# Patient Record
Sex: FEMALE | Race: BLACK OR AFRICAN AMERICAN | NOT HISPANIC OR LATINO | Employment: UNEMPLOYED | RURAL
[De-identification: names, ages, dates, MRNs, and addresses within clinical notes are randomized per-mention and may not be internally consistent; named-entity substitution may affect disease eponyms.]

---

## 2018-12-12 ENCOUNTER — HISTORICAL (OUTPATIENT)
Dept: ADMINISTRATIVE | Facility: HOSPITAL | Age: 23
End: 2018-12-12

## 2018-12-14 LAB
LAB AP CLINICAL INFORMATION: NORMAL
LAB AP GENERAL CAT - HISTORICAL: NORMAL
LAB AP INTERPRETATION/RESULT - HISTORICAL: NORMAL
LAB AP SPECIMEN ADEQUACY - HISTORICAL: NORMAL
LAB AP SPECIMEN SUBMITTED - HISTORICAL: NORMAL

## 2020-06-09 ENCOUNTER — HISTORICAL (OUTPATIENT)
Dept: ADMINISTRATIVE | Facility: HOSPITAL | Age: 25
End: 2020-06-09

## 2020-06-09 LAB — SARS-COV-2 RNA AMPLIFICATION, QUAL: NEGATIVE

## 2020-07-31 ENCOUNTER — HISTORICAL (OUTPATIENT)
Dept: ADMINISTRATIVE | Facility: HOSPITAL | Age: 25
End: 2020-07-31

## 2020-07-31 LAB — SARS-COV+SARS-COV-2 AG RESP QL IA.RAPID: NEGATIVE

## 2022-06-20 ENCOUNTER — HOSPITAL ENCOUNTER (EMERGENCY)
Facility: HOSPITAL | Age: 27
Discharge: HOME | End: 2022-06-20
Attending: FAMILY MEDICINE
Payer: MEDICAID

## 2022-06-20 VITALS
OXYGEN SATURATION: 99 % | RESPIRATION RATE: 18 BRPM | HEIGHT: 67 IN | BODY MASS INDEX: 41.04 KG/M2 | WEIGHT: 261.5 LBS | TEMPERATURE: 99 F | DIASTOLIC BLOOD PRESSURE: 73 MMHG | HEART RATE: 72 BPM | SYSTOLIC BLOOD PRESSURE: 120 MMHG

## 2022-06-20 DIAGNOSIS — N94.6 DYSMENORRHEA: Primary | ICD-10-CM

## 2022-06-20 DIAGNOSIS — D64.9 ANEMIA, UNSPECIFIED TYPE: ICD-10-CM

## 2022-06-20 DIAGNOSIS — E86.0 DEHYDRATION: ICD-10-CM

## 2022-06-20 DIAGNOSIS — R11.2 NON-INTRACTABLE VOMITING WITH NAUSEA, UNSPECIFIED VOMITING TYPE: ICD-10-CM

## 2022-06-20 LAB
ALBUMIN SERPL BCP-MCNC: 3.1 G/DL (ref 3.5–5)
ALBUMIN/GLOB SERPL: 0.7 {RATIO}
ALP SERPL-CCNC: 84 U/L (ref 37–98)
ALT SERPL W P-5'-P-CCNC: 18 U/L (ref 13–56)
ANION GAP SERPL CALCULATED.3IONS-SCNC: 11 MMOL/L (ref 7–16)
AST SERPL W P-5'-P-CCNC: 15 U/L (ref 15–37)
BASOPHILS # BLD AUTO: 0.04 K/UL (ref 0–0.2)
BASOPHILS NFR BLD AUTO: 0.3 % (ref 0–1)
BILIRUB SERPL-MCNC: 0.5 MG/DL (ref 0–1.2)
BILIRUB UR QL STRIP: NEGATIVE
BUN SERPL-MCNC: 11 MG/DL (ref 7–18)
BUN/CREAT SERPL: 11 (ref 6–20)
CALCIUM SERPL-MCNC: 8.4 MG/DL (ref 8.5–10.1)
CHLORIDE SERPL-SCNC: 107 MMOL/L (ref 98–107)
CLARITY UR: CLEAR
CO2 SERPL-SCNC: 26 MMOL/L (ref 21–32)
COLOR UR: YELLOW
CREAT SERPL-MCNC: 1.02 MG/DL (ref 0.55–1.02)
DIFFERENTIAL METHOD BLD: ABNORMAL
EOSINOPHIL # BLD AUTO: 0.14 K/UL (ref 0–0.5)
EOSINOPHIL NFR BLD AUTO: 0.9 % (ref 1–4)
ERYTHROCYTE [DISTWIDTH] IN BLOOD BY AUTOMATED COUNT: 15.6 % (ref 11.5–14.5)
GLOBULIN SER-MCNC: 4.3 G/DL (ref 2–4)
GLUCOSE SERPL-MCNC: 94 MG/DL (ref 74–106)
GLUCOSE UR STRIP-MCNC: NEGATIVE MG/DL
HCG UR QL IA.RAPID: NEGATIVE
HCT VFR BLD AUTO: 34.5 % (ref 38–47)
HGB BLD-MCNC: 11 G/DL (ref 12–16)
IMM GRANULOCYTES # BLD AUTO: 0.04 K/UL (ref 0–0.04)
IMM GRANULOCYTES NFR BLD: 0.3 % (ref 0–0.4)
KETONES UR STRIP-SCNC: NEGATIVE MG/DL
LEUKOCYTE ESTERASE UR QL STRIP: NEGATIVE
LYMPHOCYTES # BLD AUTO: 3.45 K/UL (ref 1–4.8)
LYMPHOCYTES NFR BLD AUTO: 22.1 % (ref 27–41)
MCH RBC QN AUTO: 24.5 PG (ref 27–31)
MCHC RBC AUTO-ENTMCNC: 31.9 G/DL (ref 32–36)
MCV RBC AUTO: 76.8 FL (ref 80–96)
MONOCYTES # BLD AUTO: 0.75 K/UL (ref 0–0.8)
MONOCYTES NFR BLD AUTO: 4.8 % (ref 2–6)
MPC BLD CALC-MCNC: 11.1 FL (ref 9.4–12.4)
NEUTROPHILS # BLD AUTO: 11.21 K/UL (ref 1.8–7.7)
NEUTROPHILS NFR BLD AUTO: 71.6 % (ref 53–65)
NITRITE UR QL STRIP: NEGATIVE
PH UR STRIP: 5.5 PH UNITS
PLATELET # BLD AUTO: 245 K/UL (ref 150–400)
POTASSIUM SERPL-SCNC: 3.9 MMOL/L (ref 3.5–5.1)
PROT SERPL-MCNC: 7.4 G/DL (ref 6.4–8.2)
PROT UR QL STRIP: NEGATIVE
RBC # BLD AUTO: 4.49 M/UL (ref 4.2–5.4)
RBC # UR STRIP: NEGATIVE /UL
SODIUM SERPL-SCNC: 140 MMOL/L (ref 136–145)
SP GR UR STRIP: >=1.03
UROBILINOGEN UR STRIP-ACNC: 0.2 MG/DL
WBC # BLD AUTO: 15.63 K/UL (ref 4.5–11)

## 2022-06-20 PROCEDURE — 80053 COMPREHEN METABOLIC PANEL: CPT | Performed by: FAMILY MEDICINE

## 2022-06-20 PROCEDURE — 63600175 PHARM REV CODE 636 W HCPCS: Performed by: FAMILY MEDICINE

## 2022-06-20 PROCEDURE — 99283 PR EMERGENCY DEPT VISIT,LEVEL III: ICD-10-PCS | Mod: ,,, | Performed by: FAMILY MEDICINE

## 2022-06-20 PROCEDURE — 25000003 PHARM REV CODE 250: Performed by: FAMILY MEDICINE

## 2022-06-20 PROCEDURE — 81003 URINALYSIS AUTO W/O SCOPE: CPT | Performed by: FAMILY MEDICINE

## 2022-06-20 PROCEDURE — 96361 HYDRATE IV INFUSION ADD-ON: CPT

## 2022-06-20 PROCEDURE — 36415 COLL VENOUS BLD VENIPUNCTURE: CPT | Performed by: FAMILY MEDICINE

## 2022-06-20 PROCEDURE — 85025 COMPLETE CBC W/AUTO DIFF WBC: CPT | Performed by: FAMILY MEDICINE

## 2022-06-20 PROCEDURE — 96374 THER/PROPH/DIAG INJ IV PUSH: CPT

## 2022-06-20 PROCEDURE — 99284 EMERGENCY DEPT VISIT MOD MDM: CPT | Mod: 25

## 2022-06-20 PROCEDURE — 99283 EMERGENCY DEPT VISIT LOW MDM: CPT | Mod: ,,, | Performed by: FAMILY MEDICINE

## 2022-06-20 PROCEDURE — 81025 URINE PREGNANCY TEST: CPT | Performed by: FAMILY MEDICINE

## 2022-06-20 RX ORDER — NORGESTIMATE AND ETHINYL ESTRADIOL 0.25-0.035
1 KIT ORAL DAILY
COMMUNITY
Start: 2022-06-07 | End: 2023-07-12

## 2022-06-20 RX ORDER — ONDANSETRON 2 MG/ML
4 INJECTION INTRAMUSCULAR; INTRAVENOUS
Status: COMPLETED | OUTPATIENT
Start: 2022-06-20 | End: 2022-06-20

## 2022-06-20 RX ORDER — ONDANSETRON 4 MG/1
4 TABLET, ORALLY DISINTEGRATING ORAL EVERY 6 HOURS PRN
Qty: 12 TABLET | Refills: 0 | Status: SHIPPED | OUTPATIENT
Start: 2022-06-20 | End: 2022-07-02

## 2022-06-20 RX ORDER — SODIUM CHLORIDE 450 MG/100ML
INJECTION, SOLUTION INTRAVENOUS
Status: COMPLETED | OUTPATIENT
Start: 2022-06-20 | End: 2022-06-20

## 2022-06-20 RX ADMIN — ONDANSETRON 4 MG: 2 INJECTION INTRAMUSCULAR; INTRAVENOUS at 02:06

## 2022-06-20 RX ADMIN — SODIUM CHLORIDE 1000 ML: 9 INJECTION, SOLUTION INTRAVENOUS at 02:06

## 2022-06-20 RX ADMIN — SODIUM CHLORIDE: 0.45 INJECTION, SOLUTION INTRAVENOUS at 03:06

## 2022-06-20 NOTE — DISCHARGE INSTRUCTIONS
F/u with PCP who prescribed the birth control pills.    Discuss whether continuing the current pills or to stop them, or to substitute a different prescription that may relieve your symptoms without causing new ones.

## 2022-06-20 NOTE — ED TRIAGE NOTES
Pt c/o severe menstral cramps with heavy bleeding and clots x5 days. Also having n/v that started today. Denies diarrhea. Pt states that she started on birth control pills 5 days ago when her cycle started. States that she was having no trouble with her periods until she started this new med.

## 2022-06-20 NOTE — ED PROVIDER NOTES
Encounter Date: 2022       History     Chief Complaint   Patient presents with    Vomiting    Dysmenorrhea     Pt reports having started a new OCP to try and control heavy menses.  She hasn't usually had pain, but has had heavy menses.  Her PCP started these OCPs.  She started about 5 days ago, and is now having both heavy bleeding with clots AND sharp, severe menstrual cramps.  She is having N/V as well.  No diarrhea.  No F/C.              Review of patient's allergies indicates:  No Known Allergies  History reviewed. No pertinent past medical history.  Past Surgical History:   Procedure Laterality Date     SECTION      x2     History reviewed. No pertinent family history.  Social History     Tobacco Use    Smoking status: Former Smoker    Smokeless tobacco: Never Used   Substance Use Topics    Alcohol use: Yes    Drug use: Never     Review of Systems   Gastrointestinal: Positive for nausea and vomiting. Negative for abdominal distention, abdominal pain, anal bleeding, blood in stool, constipation, diarrhea and rectal pain.   Genitourinary: Positive for menstrual problem, pelvic pain and vaginal bleeding. Negative for decreased urine volume, difficulty urinating, dyspareunia, dysuria, enuresis, flank pain, frequency, genital sores, hematuria, urgency, vaginal discharge and vaginal pain.   All other systems reviewed and are negative.      Physical Exam     Initial Vitals [22 1338]   BP Pulse Resp Temp SpO2   121/73 82 18 98.5 °F (36.9 °C) 100 %      MAP       --         Physical Exam    Nursing note and vitals reviewed.  Constitutional: She appears well-developed and well-nourished.   HENT:   Head: Normocephalic and atraumatic.   Neck: Neck supple. No tracheal deviation present. No JVD present.   Cardiovascular: Normal rate, regular rhythm, normal heart sounds and intact distal pulses. Exam reveals no gallop and no friction rub.    No murmur heard.  Pulmonary/Chest: Breath sounds normal. No  stridor. No respiratory distress. She has no wheezes. She has no rhonchi. She has no rales.   Abdominal: Abdomen is soft. Bowel sounds are normal. She exhibits no distension. There is abdominal tenderness (increase in discomfort to palpation, but no significant pain.  No rebound, guarding or peritoneal signs.  ). There is no rebound and no guarding.   Musculoskeletal:         General: No tenderness or edema. Normal range of motion.      Cervical back: Neck supple.     Neurological: She is alert and oriented to person, place, and time.   Skin: Skin is warm and dry. Capillary refill takes less than 2 seconds.   Psychiatric: She has a normal mood and affect.         Medical Screening Exam   See Full Note    ED Course   Procedures  Labs Reviewed   COMPREHENSIVE METABOLIC PANEL - Abnormal; Notable for the following components:       Result Value    Calcium 8.4 (*)     Albumin 3.1 (*)     Globulin 4.3 (*)     All other components within normal limits   URINALYSIS, REFLEX TO URINE CULTURE - Abnormal; Notable for the following components:    Specific Gravity, UA >=1.030 (*)     All other components within normal limits   CBC WITH DIFFERENTIAL - Abnormal; Notable for the following components:    WBC 15.63 (*)     Hemoglobin 11.0 (*)     Hematocrit 34.5 (*)     MCV 76.8 (*)     MCH 24.5 (*)     MCHC 31.9 (*)     RDW 15.6 (*)     Neutrophils % 71.6 (*)     Lymphocytes % 22.1 (*)     Neutrophils, Abs 11.21 (*)     Eosinophils % 0.9 (*)     All other components within normal limits   HCG QUALITATIVE URINE - Normal   CBC W/ AUTO DIFFERENTIAL    Narrative:     The following orders were created for panel order CBC auto differential.  Procedure                               Abnormality         Status                     ---------                               -----------         ------                     CBC with Differential[[id number]]        Abnormal            Final result                 Please view results for these tests on  the individual orders.          Imaging Results    None          Medications   sodium chloride 0.9% bolus 1,000 mL (1,000 mLs Intravenous New Bag 6/20/22 1418)   ondansetron injection 4 mg (4 mg Intravenous Given 6/20/22 1418)                       Clinical Impression:   Final diagnoses:  [N94.6] Dysmenorrhea (Primary)  [R11.2] Non-intractable vomiting with nausea, unspecified vomiting type  [E86.0] Dehydration  [D64.9] Anemia, unspecified type          ED Disposition Condition    Discharge Stable        ED Prescriptions     Medication Sig Dispense Start Date End Date Auth. Provider    ondansetron (ZOFRAN-ODT) 4 MG TbDL Take 1 tablet (4 mg total) by mouth every 6 (six) hours as needed (nausea/vomiting). 12 tablet 6/20/2022 7/2/2022 Werner Zhao MD        Follow-up Information    None          Werner Zhao MD  06/20/22 1423       Werner Zhao MD  06/20/22 1423       Werner Zhao MD  06/20/22 1508

## 2023-04-10 ENCOUNTER — PROCEDURE VISIT (OUTPATIENT)
Dept: OBSTETRICS AND GYNECOLOGY | Facility: CLINIC | Age: 28
End: 2023-04-10
Payer: MEDICAID

## 2023-04-10 ENCOUNTER — OFFICE VISIT (OUTPATIENT)
Dept: OBSTETRICS AND GYNECOLOGY | Facility: CLINIC | Age: 28
End: 2023-04-10
Payer: MEDICAID

## 2023-04-10 VITALS
HEART RATE: 69 BPM | OXYGEN SATURATION: 99 % | BODY MASS INDEX: 39.77 KG/M2 | WEIGHT: 253.38 LBS | DIASTOLIC BLOOD PRESSURE: 85 MMHG | SYSTOLIC BLOOD PRESSURE: 127 MMHG | RESPIRATION RATE: 18 BRPM | HEIGHT: 67 IN

## 2023-04-10 DIAGNOSIS — O21.9 NAUSEA AND VOMITING DURING PREGNANCY: ICD-10-CM

## 2023-04-10 DIAGNOSIS — Z32.01 POSITIVE PREGNANCY TEST: ICD-10-CM

## 2023-04-10 DIAGNOSIS — N91.1 SECONDARY AMENORRHEA: Primary | ICD-10-CM

## 2023-04-10 LAB
B-HCG UR QL: POSITIVE
CTP QC/QA: YES

## 2023-04-10 PROCEDURE — 99203 OFFICE O/P NEW LOW 30 MIN: CPT | Mod: TH,,, | Performed by: OBSTETRICS & GYNECOLOGY

## 2023-04-10 PROCEDURE — 3074F PR MOST RECENT SYSTOLIC BLOOD PRESSURE < 130 MM HG: ICD-10-PCS | Mod: CPTII,,, | Performed by: OBSTETRICS & GYNECOLOGY

## 2023-04-10 PROCEDURE — 3079F PR MOST RECENT DIASTOLIC BLOOD PRESSURE 80-89 MM HG: ICD-10-PCS | Mod: CPTII,,, | Performed by: OBSTETRICS & GYNECOLOGY

## 2023-04-10 PROCEDURE — 99203 PR OFFICE/OUTPT VISIT, NEW, LEVL III, 30-44 MIN: ICD-10-PCS | Mod: TH,,, | Performed by: OBSTETRICS & GYNECOLOGY

## 2023-04-10 PROCEDURE — 76801 PR US, OB <14WKS, TRANSABD, SINGLE GESTATION: ICD-10-PCS | Mod: ,,, | Performed by: OBSTETRICS & GYNECOLOGY

## 2023-04-10 PROCEDURE — 76801 OB US < 14 WKS SINGLE FETUS: CPT | Mod: ,,, | Performed by: OBSTETRICS & GYNECOLOGY

## 2023-04-10 PROCEDURE — 99499 NO LOS: ICD-10-PCS | Mod: ,,, | Performed by: OBSTETRICS & GYNECOLOGY

## 2023-04-10 PROCEDURE — 81025 URINE PREGNANCY TEST: CPT | Mod: QW,,, | Performed by: OBSTETRICS & GYNECOLOGY

## 2023-04-10 PROCEDURE — 99499 UNLISTED E&M SERVICE: CPT | Mod: ,,, | Performed by: OBSTETRICS & GYNECOLOGY

## 2023-04-10 PROCEDURE — 1159F PR MEDICATION LIST DOCUMENTED IN MEDICAL RECORD: ICD-10-PCS | Mod: CPTII,,, | Performed by: OBSTETRICS & GYNECOLOGY

## 2023-04-10 PROCEDURE — 3008F BODY MASS INDEX DOCD: CPT | Mod: CPTII,,, | Performed by: OBSTETRICS & GYNECOLOGY

## 2023-04-10 PROCEDURE — 3079F DIAST BP 80-89 MM HG: CPT | Mod: CPTII,,, | Performed by: OBSTETRICS & GYNECOLOGY

## 2023-04-10 PROCEDURE — 3074F SYST BP LT 130 MM HG: CPT | Mod: CPTII,,, | Performed by: OBSTETRICS & GYNECOLOGY

## 2023-04-10 PROCEDURE — 1159F MED LIST DOCD IN RCRD: CPT | Mod: CPTII,,, | Performed by: OBSTETRICS & GYNECOLOGY

## 2023-04-10 PROCEDURE — 3008F PR BODY MASS INDEX (BMI) DOCUMENTED: ICD-10-PCS | Mod: CPTII,,, | Performed by: OBSTETRICS & GYNECOLOGY

## 2023-04-10 PROCEDURE — 81025 POCT URINE PREGNANCY: ICD-10-PCS | Mod: QW,,, | Performed by: OBSTETRICS & GYNECOLOGY

## 2023-04-10 RX ORDER — ONDANSETRON 4 MG/1
4 TABLET, FILM COATED ORAL EVERY 6 HOURS PRN
Qty: 90 TABLET | Refills: 3 | Status: SHIPPED | OUTPATIENT
Start: 2023-04-10 | End: 2023-05-10

## 2023-04-10 NOTE — PROGRESS NOTES
CC: Positive Pregnancy Test    HISTORY OF PRESENT ILLNESS:    Nigel Camejo is a 27 y.o. female, ,  Presents today for a routine exam complaining of amenorrhea and positive home urine pregnancy test.  Patient's last menstrual period was 2023 (exact date).   She is not currently on any contraception.  Reports nausea. Reports breast tenderness. Denies vaginal bleeding and pelvic pain.      Review of patient's allergies indicates:  No Known Allergies    History reviewed. No pertinent past medical history.  Past Surgical History:   Procedure Laterality Date     SECTION      x2     OB History          3    Para   2    Term   2            AB        Living   2         SAB        IAB        Ectopic        Multiple        Live Births                   Family History   Problem Relation Age of Onset    Bone cancer Maternal Grandmother     Pancreatic cancer Maternal Grandfather     Diabetes Father     Hypertension Mother     Heart failure Mother     Diabetes Maternal Aunt     Cancer Maternal Aunt     Kidney failure Maternal Aunt      Social History     Tobacco Use    Smoking status: Former    Smokeless tobacco: Never   Substance Use Topics    Alcohol use: Not Currently    Drug use: Never       Current Outpatient Medications   Medication Sig    ondansetron (ZOFRAN) 4 MG tablet Take 1 tablet (4 mg total) by mouth every 6 (six) hours as needed for Nausea.    PNV,calcium 72-iron-folic acid (PRENATAL VITAMIN PLUS LOW IRON) 27 mg iron- 1 mg Tab Take 1 tablet (1 each total) by mouth once daily.    SPRINTEC, 28, 0.25-35 mg-mcg per tablet Take 1 tablet by mouth once daily.     No current facility-administered medications for this visit.       OB History    Para Term  AB Living   3 2 2     2   SAB IAB Ectopic Multiple Live Births                  # Outcome Date GA Lbr Volodymyr/2nd Weight Sex Delivery Anes PTL Lv   3 Current            2 Term            1 Term              "      ROS:  GENERAL: No weight changes. No swelling. No fatigue. No fever.  CARDIOVASCULAR: No chest pain. No shortness of breath. No leg cramps.   NEUROLOGICAL: No headaches. No vision changes.  BREASTS: No pain. No lumps. No discharge.  ABDOMEN: No pain. No diarrhea. No constipation.  REPRODUCTIVE: No abnormal bleeding.   VULVA: No pain. No lesions. No itching.  VAGINA: No relaxation. No itching. No odor. No discharge. No lesions.  URINARY: No incontinence. No nocturia. No frequency. No dysuria.    /85   Pulse 69   Resp 18   Ht 5' 7" (1.702 m)   Wt 114.9 kg (253 lb 6.4 oz)   LMP 02/04/2023 (Exact Date)   SpO2 99%   BMI 39.69 kg/m²     PE:  AFFECT: Calm, alert and oriented X 3. Interactive during exam  GENERAL: Appears well-nourished, well-developed, in no acute distress.  HEAD: Normocephalic, atruamatic  TEETH: Good dentition.  THYROID: No thyromegally   BREASTS: No masses, skin changes, nipple discharge or adenopathy bilaterally.  SKIN: Normal for race, warm, & dry. No lesions or rashes.  LUNGS: Easy and unlabored, clear to auscultation bilaterally.  HEART: Regular rate and rhythm   ABDOMEN: Soft and nontender without masses or organomegally.  VULVA: No lesions, masses or tenderness.  VAGINA: Moist and well rugated without lesions or discharge.  CERVIX: Moist and pink without lesions, discharge or tenderness.      UTERUS SIZE: 9 week size, nontender and without masses.  ADNEXA: No masses or tenderness.  ESTIMATE OF PELVIC CAPACITY: Adequate  EXTREMITIES: No cyanosis, clubbing or edema. No calf tenderness.  LYMPH NODES: No axillary or inguinal adenopathy.      ASSESSMENT:       ICD-10-CM ICD-9-CM    1. Secondary amenorrhea  N91.1 626.0 POCT urine pregnancy      PNV,calcium 72-iron-folic acid (PRENATAL VITAMIN PLUS LOW IRON) 27 mg iron- 1 mg Tab      2. Nausea and vomiting during pregnancy  O21.9 643.90 ondansetron (ZOFRAN) 4 MG tablet      3. Positive pregnancy test  Z32.01 V72.42              Plan: "     Amenorrhea  Positive urine pregnancy test (JOAN: 23, EGA: 9w2d based on LMP)    -  Routine prenatal care    Nausea and vomiting in pregnancy    -  Education regarding lifestyle and dietary modifications    -  Advised use of B6/Unisom. Pt will notify us if no relief/worsening symptoms, will consider Zofran if needed.      1st TRIMESTER COUNSELING: Discussed all, booklet provided:  Common complaints of pregnancy  HIV and other routine prenatal tests including  genetic screening  Risk factors identified by prenatal history  Oriented to practice - discussed anticipated course of prenatal care & indications for Ultrasound  Childbirth classes/Hospital facilities   Nutrition and weight gain counseling  Toxoplasmosis precautions (Cats/Raw Meat)  Sexual activity and exercise  Environmental/Work hazards  Travel  Tobacco (Ask, Advise, Assess, Assist, and Arrange), as well as alcohol and drug use  Use of any medications (Including supplements, Vitamins, Herbs, or OTC Drugs)  Domestic violence  Seat belt use      TERATOLOGY COUNSELING:   Discussed indications and options for aneuploidy screening - pamphlets given    -  Pt desires Dior testing.  Dating US today.  Follow up in about 2 weeks (around 2023) for annual and 4 wks carly England M.D., FCOG    OB/GYN

## 2023-05-11 ENCOUNTER — ROUTINE PRENATAL (OUTPATIENT)
Dept: OBSTETRICS AND GYNECOLOGY | Facility: CLINIC | Age: 28
End: 2023-05-11
Payer: MEDICAID

## 2023-05-11 VITALS
BODY MASS INDEX: 40.88 KG/M2 | DIASTOLIC BLOOD PRESSURE: 73 MMHG | WEIGHT: 261 LBS | HEART RATE: 96 BPM | SYSTOLIC BLOOD PRESSURE: 129 MMHG

## 2023-05-11 DIAGNOSIS — A59.9 TRICHOMONIASIS: ICD-10-CM

## 2023-05-11 DIAGNOSIS — K59.00 CONSTIPATION, UNSPECIFIED CONSTIPATION TYPE: ICD-10-CM

## 2023-05-11 DIAGNOSIS — Z3A.13 13 WEEKS GESTATION OF PREGNANCY: Primary | ICD-10-CM

## 2023-05-11 PROCEDURE — 99213 PR OFFICE/OUTPT VISIT, EST, LEVL III, 20-29 MIN: ICD-10-PCS | Mod: TH,,, | Performed by: OBSTETRICS & GYNECOLOGY

## 2023-05-11 PROCEDURE — 99213 OFFICE O/P EST LOW 20 MIN: CPT | Mod: TH,,, | Performed by: OBSTETRICS & GYNECOLOGY

## 2023-05-11 RX ORDER — DOCUSATE SODIUM 100 MG/1
100 CAPSULE, LIQUID FILLED ORAL 2 TIMES DAILY
Qty: 60 CAPSULE | Refills: 1 | Status: SHIPPED | OUTPATIENT
Start: 2023-05-11 | End: 2024-02-15

## 2023-05-11 RX ORDER — METRONIDAZOLE 500 MG/1
2000 TABLET ORAL EVERY 12 HOURS
Qty: 8 TABLET | Refills: 0 | Status: SHIPPED | OUTPATIENT
Start: 2023-05-11 | End: 2023-05-12

## 2023-05-11 NOTE — PROGRESS NOTES
27 y.o. female  at 13w5d   Reports fetal movement or fluttering. Denies any vaginal bleeding, leakage of fluid, cramping, contractions, or pressure.   She complains of constipation.  Pt states she is doing well without any concerns.     Vitals  BP: 129/73  Pulse: 96  Weight: 118.4 kg (261 lb)  Prenatal  Fetal Heart Rate: 150s  Movement: Absent  Edema  LLE Edema: None  RLE Edema: None  Facial: None  Additional Edema?: No    Prenatal Labs:  Lab Results   Component Value Date    GROUPTRH O POS 2023    HGB 10.7 (L) 2023    HCT 35.3 (L) 2023     2023    SICKLE Negative 2023    HEPBSAG Non-Reactive 2023    [id number] Non-Reactive 2023    LABNGO Negative 2023    LABURIN Skin/Urogenital Mae Isolated, no further workup. 2023    [id number] Negative 2020       No pregnancy checklist tasks were completed during this visit, and no tasks are pending completion.      Daily fetal kick counts, bleeding, and  labor/labor precautions discussed.  Questions answered to desired level of satisfaction  Verbalized understanding to all information and instructions provided.    Total weight gain/weight loss in pregnancy: Not found.     Follow up in about 4 weeks (around 2023) for reshma, DAHLIA.    A: 13w5d     ICD-10-CM ICD-9-CM    1. 13 weeks gestation of pregnancy  Z3A.13 V22.2       2. Constipation, unspecified constipation type  K59.00 564.00 docusate sodium (COLACE) 100 MG capsule      3. Trichomoniasis  A59.9 131.9 metroNIDAZOLE (FLAGYL) 500 MG tablet            Jim England M.D., FCOG    OB/GYN

## 2023-05-15 DIAGNOSIS — A59.01 VAGINAL TRICHOMONIASIS: Primary | ICD-10-CM

## 2023-05-15 RX ORDER — METRONIDAZOLE 500 MG/1
2000 TABLET ORAL ONCE
Qty: 4 TABLET | Refills: 0 | Status: SHIPPED | OUTPATIENT
Start: 2023-05-15 | End: 2023-05-15

## 2023-05-17 ENCOUNTER — TELEPHONE (OUTPATIENT)
Dept: OBSTETRICS AND GYNECOLOGY | Facility: CLINIC | Age: 28
End: 2023-05-17
Payer: COMMERCIAL

## 2023-05-17 NOTE — TELEPHONE ENCOUNTER
Informed pt of message from Dr. Hair: Your lab resulted indicated you have trichomonas. Trichomonas is a sexually transmitted disease and there is no way to tell how long the infection has been present.  Please inform your partner  to obtain treatment.  Please abstain from intercourse for at least 2 weeks after treatment from each other to ensure no re-infection. A prescription has been sent to the pharmacy on file to treat you for trichomonas.  Pt verbalized understanding.

## 2023-05-17 NOTE — TELEPHONE ENCOUNTER
----- Message from Jim England MD sent at 5/15/2023  9:56 PM CDT -----  Your lab resulted indicated you have trichomonas. Trichomonas is a sexually transmitted disease and there is no way to tell how long the infection has been present.  Please inform your partner  to obtain treatment.  Please abstain from intercourse for at least 2 weeks after treatment from each other to ensure no re-infection. A prescription has been sent to the pharmacy on file to treat you for trichomonas.

## 2023-06-08 ENCOUNTER — ROUTINE PRENATAL (OUTPATIENT)
Dept: OBSTETRICS AND GYNECOLOGY | Facility: CLINIC | Age: 28
End: 2023-06-08
Payer: MEDICAID

## 2023-06-08 VITALS
WEIGHT: 262.81 LBS | HEART RATE: 86 BPM | DIASTOLIC BLOOD PRESSURE: 78 MMHG | BODY MASS INDEX: 41.16 KG/M2 | SYSTOLIC BLOOD PRESSURE: 120 MMHG

## 2023-06-08 DIAGNOSIS — Z86.19 HX OF TRICHOMONIASIS: ICD-10-CM

## 2023-06-08 DIAGNOSIS — Z36.9 ANTENATAL SCREENING ENCOUNTER: Primary | ICD-10-CM

## 2023-06-08 DIAGNOSIS — Z3A.17 17 WEEKS GESTATION OF PREGNANCY: ICD-10-CM

## 2023-06-08 LAB
BILIRUB SERPL-MCNC: NORMAL MG/DL
BLOOD, POC UA: NORMAL
GLUCOSE UR QL STRIP: NORMAL
KETONES UR QL STRIP: NORMAL
LEUKOCYTE ESTERASE URINE, POC: NORMAL
NITRITE, POC UA: NORMAL
PH, POC UA: 7
PROTEIN, POC: 30
SPECIFIC GRAVITY, POC UA: 1.02
UROBILINOGEN, POC UA: 0.2

## 2023-06-08 PROCEDURE — 59425 ANTEPARTUM CARE ONLY: CPT | Mod: TH,,, | Performed by: OBSTETRICS & GYNECOLOGY

## 2023-06-08 PROCEDURE — 59425 PR ANTEPARTUM CARE ONLY, 4-6 VISITS: ICD-10-PCS | Mod: TH,,, | Performed by: OBSTETRICS & GYNECOLOGY

## 2023-06-08 RX ORDER — ONDANSETRON 4 MG/1
4 TABLET, FILM COATED ORAL EVERY 6 HOURS PRN
COMMUNITY
Start: 2023-05-22 | End: 2024-02-15 | Stop reason: ALTCHOICE

## 2023-06-08 NOTE — PROGRESS NOTES
27 y.o. female  at 17w5d   Reports fetal movement or fluttering. Denies any vaginal bleeding, leakage of fluid, cramping, contractions, or pressure.   She complains of nothing.  Pt states she is doing well without any concerns.     Vitals  BP: 120/78  Pulse: 86  Weight: 119.2 kg (262 lb 12.8 oz)  Prenatal  Fetal Heart Rate: 150  Movement: Absent  Urine Albumin/Glucose  Urine Glucose: 1+  Edema  LLE Edema: None  RLE Edema: None  Facial: None  Additional Edema?: No    Prenatal Labs:  Lab Results   Component Value Date    GROUPTRH O POS 2023    HGB 10.7 (L) 2023    HCT 35.3 (L) 2023     2023    SICKLE Negative 2023    HEPBSAG Non-Reactive 2023    [id number] Non-Reactive 2023    LABNGO Negative 2023    LABURIN Skin/Urogenital Mae Isolated, no further workup. 2023    [id number] Negative 2020       No pregnancy checklist tasks were completed during this visit, and no tasks are pending completion.      Daily fetal kick counts, bleeding, and  labor/labor precautions discussed.  Questions answered to desired level of satisfaction  Verbalized understanding to all information and instructions provided.    Total weight gain/weight loss in pregnancy: Not found.     Follow up in about 4 weeks (around 2023) for reshma U/S w/ Dr. Moseley 3 wks.    A: 17w5d     ICD-10-CM ICD-9-CM    1.  screening encounter  Z36.9 V28.9 POCT Urinalysis      US OB 14+ Wks, TransAbd, Single Gestation      2. 17 weeks gestation of pregnancy  Z3A.17 V22.2 POCT Urinalysis      3. Hx of trichomoniasis  Z86.19 V12.09 Trichomonas vaginalis by PCR            Jim England M.D., FCOG    OB/GYN

## 2023-06-11 NOTE — PROCEDURES
Procedures  New OB Ultrasound note:      Uterus 10.84 x 6.07 x 7.22 cm  Right ovary 3.27 x 2.34 x 1.66 cm  Left ovary not seen    Crown-rump length 9 weeks 3 day  Fetal heart rate 177 beats per minute     Impression:    IUP with fetal heart tones   Estimated gestational age 9 weeks 3 day  Estimated delivery date November 10, 2020 30

## 2023-06-29 ENCOUNTER — HOSPITAL ENCOUNTER (OUTPATIENT)
Dept: RADIOLOGY | Facility: HOSPITAL | Age: 28
Discharge: HOME | End: 2023-06-29
Attending: OBSTETRICS & GYNECOLOGY
Payer: COMMERCIAL

## 2023-06-29 DIAGNOSIS — Z36.9 ANTENATAL SCREENING ENCOUNTER: ICD-10-CM

## 2023-06-29 PROCEDURE — 76805 OB US >/= 14 WKS SNGL FETUS: CPT | Mod: TC

## 2023-06-29 PROCEDURE — 76805 OB US >/= 14 WKS SNGL FETUS: CPT | Mod: 26,,, | Performed by: RADIOLOGY

## 2023-06-29 PROCEDURE — 76805 US OB 14+ WEEKS, TRANSABDOM, SINGLE GESTATION: ICD-10-PCS | Mod: 26,,, | Performed by: RADIOLOGY

## 2023-07-11 ENCOUNTER — ROUTINE PRENATAL (OUTPATIENT)
Dept: OBSTETRICS AND GYNECOLOGY | Facility: CLINIC | Age: 28
End: 2023-07-11
Payer: COMMERCIAL

## 2023-07-11 VITALS
BODY MASS INDEX: 40.16 KG/M2 | DIASTOLIC BLOOD PRESSURE: 72 MMHG | SYSTOLIC BLOOD PRESSURE: 122 MMHG | WEIGHT: 256.38 LBS | HEART RATE: 93 BPM

## 2023-07-11 DIAGNOSIS — Z36.9 UNSPECIFIED ANTENATAL SCREENING: ICD-10-CM

## 2023-07-11 DIAGNOSIS — O34.219 HISTORY OF CESAREAN DELIVERY, CURRENTLY PREGNANT: ICD-10-CM

## 2023-07-11 DIAGNOSIS — Z86.19 HX OF TRICHOMONIASIS: ICD-10-CM

## 2023-07-11 DIAGNOSIS — Z3A.22 22 WEEKS GESTATION OF PREGNANCY: Primary | ICD-10-CM

## 2023-07-11 LAB
BILIRUB SERPL-MCNC: NORMAL MG/DL
BLOOD, POC UA: NORMAL
GLUCOSE UR QL STRIP: NORMAL
KETONES UR QL STRIP: NORMAL
LEUKOCYTE ESTERASE URINE, POC: NORMAL
NITRITE, POC UA: NORMAL
PH, POC UA: 5.5
PROTEIN, POC: NORMAL
SPECIFIC GRAVITY, POC UA: >=1.03
UROBILINOGEN, POC UA: 0.2

## 2023-07-11 PROCEDURE — 87661 TRICHOMONAS VAGINALIS BY PCR: ICD-10-PCS | Mod: ,,, | Performed by: CLINICAL MEDICAL LABORATORY

## 2023-07-11 PROCEDURE — 87661 TRICHOMONAS VAGINALIS AMPLIF: CPT | Mod: ,,, | Performed by: CLINICAL MEDICAL LABORATORY

## 2023-07-11 PROCEDURE — 59425 PR ANTEPARTUM CARE ONLY, 4-6 VISITS: ICD-10-PCS | Mod: TH,,, | Performed by: OBSTETRICS & GYNECOLOGY

## 2023-07-11 PROCEDURE — 59425 ANTEPARTUM CARE ONLY: CPT | Mod: TH,,, | Performed by: OBSTETRICS & GYNECOLOGY

## 2023-07-11 NOTE — PROGRESS NOTES
27 y.o. female  at 22w3d   Reports fetal movement or fluttering. Denies any vaginal bleeding, leakage of fluid, cramping, contractions, or pressure.   She complains of nothing.  Pt states she is doing well without any concerns.     Vitals  BP: 122/72  Pulse: 93  Weight: 116.3 kg (256 lb 6.4 oz)  Prenatal  Fetal Heart Rate: 140s  Movement: Present    Prenatal Labs:  Lab Results   Component Value Date    GROUPTRH O POS 2023    HGB 10.7 (L) 2023    HCT 35.3 (L) 2023     2023    SICKLE Negative 2023    HEPBSAG Non-Reactive 2023    [id number] Non-Reactive 2023    LABNGO Negative 2023    LABURIN Skin/Urogenital Mae Isolated, no further workup. 2023    [id number] Negative 2020       The following were addressed during this visit:    13-16 Weeks  - Quad screen   - Anatomy Ultrasound   - Breastfeeding Concerns & Resources   - Importance of Early Skin to Skin Contact     17-20 Weeks  - Quickening   - Lifestyle   - Ultrasound   - Importance of Early and Frequent Breastfeeding   - Baby-led Feeding   - Frequent feeding to help assure optimal milk production     21-24 Weeks  -  Labor Signs   - Travel During Pregnancy   - Gestational diabetes screening protocol   - Effective Position and Latch   - Risks of Formula Use   - Risks of pacifier use       Daily fetal kick counts, bleeding, and  labor/labor precautions discussed.  Questions answered to desired level of satisfaction  Verbalized understanding to all information and instructions provided.    Total weight gain/weight loss in pregnancy: Not found.     Follow up in about 4 weeks (around 2023) for reshma.    A: 22w3d     ICD-10-CM ICD-9-CM    1. 22 weeks gestation of pregnancy  Z3A.22 V22.2 POCT Urinalysis      2. Unspecified  screening  Z36.9 V28.9 POCT Urinalysis      3. History of  delivery, currently pregnant  O34.219 654.20       4. Hx of trichomoniasis  Z86.19  V12.09 Trichomonas vaginalis by PCR            Jim England M.D., FCOG    OB/GYN

## 2023-07-12 LAB — TRICHOMONAS NAT: NEGATIVE

## 2023-07-19 ENCOUNTER — PATIENT MESSAGE (OUTPATIENT)
Dept: RESEARCH | Facility: HOSPITAL | Age: 28
End: 2023-07-19

## 2023-07-24 ENCOUNTER — PATIENT MESSAGE (OUTPATIENT)
Dept: RESEARCH | Facility: HOSPITAL | Age: 28
End: 2023-07-24

## 2023-07-30 ENCOUNTER — HOSPITAL ENCOUNTER (EMERGENCY)
Facility: HOSPITAL | Age: 28
Discharge: HOME | End: 2023-07-30
Attending: EMERGENCY MEDICINE
Payer: COMMERCIAL

## 2023-07-30 VITALS
HEIGHT: 67 IN | HEART RATE: 85 BPM | WEIGHT: 251 LBS | SYSTOLIC BLOOD PRESSURE: 139 MMHG | TEMPERATURE: 98 F | RESPIRATION RATE: 10 BRPM | BODY MASS INDEX: 39.39 KG/M2 | OXYGEN SATURATION: 97 % | DIASTOLIC BLOOD PRESSURE: 70 MMHG

## 2023-07-30 DIAGNOSIS — R06.02 SHORTNESS OF BREATH: ICD-10-CM

## 2023-07-30 DIAGNOSIS — Z3A.25 25 WEEKS GESTATION OF PREGNANCY: Primary | ICD-10-CM

## 2023-07-30 LAB
ALBUMIN SERPL BCP-MCNC: 2.8 G/DL (ref 3.5–5)
ALBUMIN/GLOB SERPL: 0.6 {RATIO}
ALP SERPL-CCNC: 93 U/L (ref 37–98)
ALT SERPL W P-5'-P-CCNC: 19 U/L (ref 13–56)
ANION GAP SERPL CALCULATED.3IONS-SCNC: 12 MMOL/L (ref 7–16)
ANISOCYTOSIS BLD QL SMEAR: ABNORMAL
AST SERPL W P-5'-P-CCNC: 16 U/L (ref 15–37)
BASOPHILS # BLD AUTO: 0.02 K/UL (ref 0–0.2)
BASOPHILS NFR BLD AUTO: 0.1 % (ref 0–1)
BILIRUB SERPL-MCNC: 0.4 MG/DL (ref ?–1.2)
BUN SERPL-MCNC: 5 MG/DL (ref 7–18)
BUN/CREAT SERPL: 6 (ref 6–20)
CALCIUM SERPL-MCNC: 9.4 MG/DL (ref 8.5–10.1)
CHLORIDE SERPL-SCNC: 101 MMOL/L (ref 98–107)
CO2 SERPL-SCNC: 24 MMOL/L (ref 21–32)
CREAT SERPL-MCNC: 0.77 MG/DL (ref 0.55–1.02)
CRENATED CELLS: ABNORMAL
DIFFERENTIAL METHOD BLD: ABNORMAL
EGFR (NO RACE VARIABLE) (RUSH/TITUS): 107 ML/MIN/1.73M2
EOSINOPHIL # BLD AUTO: 0.12 K/UL (ref 0–0.5)
EOSINOPHIL NFR BLD AUTO: 0.7 % (ref 1–4)
ERYTHROCYTE [DISTWIDTH] IN BLOOD BY AUTOMATED COUNT: 16.7 % (ref 11.5–14.5)
GLOBULIN SER-MCNC: 4.8 G/DL (ref 2–4)
GLUCOSE SERPL-MCNC: 78 MG/DL (ref 74–106)
HCT VFR BLD AUTO: 32.8 % (ref 38–47)
HGB BLD-MCNC: 10.3 G/DL (ref 12–16)
IMM GRANULOCYTES # BLD AUTO: 0.08 K/UL (ref 0–0.04)
IMM GRANULOCYTES NFR BLD: 0.5 % (ref 0–0.4)
LYMPHOCYTES # BLD AUTO: 3.39 K/UL (ref 1–4.8)
LYMPHOCYTES NFR BLD AUTO: 20.6 % (ref 27–41)
MCH RBC QN AUTO: 23 PG (ref 27–31)
MCHC RBC AUTO-ENTMCNC: 31.4 G/DL (ref 32–36)
MCV RBC AUTO: 73.4 FL (ref 80–96)
MICROCYTES BLD QL SMEAR: ABNORMAL
MONOCYTES # BLD AUTO: 0.95 K/UL (ref 0–0.8)
MONOCYTES NFR BLD AUTO: 5.8 % (ref 2–6)
MPC BLD CALC-MCNC: 10.6 FL (ref 9.4–12.4)
NEUTROPHILS # BLD AUTO: 11.86 K/UL (ref 1.8–7.7)
NEUTROPHILS NFR BLD AUTO: 72.3 % (ref 53–65)
NRBC # BLD AUTO: 0 X10E3/UL
NRBC, AUTO (.00): 0 %
NT-PROBNP SERPL-MCNC: 25 PG/ML (ref 1–125)
OVALOCYTES BLD QL SMEAR: ABNORMAL
PLATELET # BLD AUTO: 261 K/UL (ref 150–400)
PLATELET MORPHOLOGY: ABNORMAL
POLYCHROMASIA BLD QL SMEAR: ABNORMAL
POTASSIUM SERPL-SCNC: 3.7 MMOL/L (ref 3.5–5.1)
PROT SERPL-MCNC: 7.6 G/DL (ref 6.4–8.2)
RBC # BLD AUTO: 4.47 M/UL (ref 4.2–5.4)
SODIUM SERPL-SCNC: 133 MMOL/L (ref 136–145)
TROPONIN I SERPL DL<=0.01 NG/ML-MCNC: <4 PG/ML
WBC # BLD AUTO: 16.42 K/UL (ref 4.5–11)

## 2023-07-30 PROCEDURE — 80053 COMPREHEN METABOLIC PANEL: CPT | Performed by: EMERGENCY MEDICINE

## 2023-07-30 PROCEDURE — 93010 ELECTROCARDIOGRAM REPORT: CPT | Mod: ,,, | Performed by: INTERNAL MEDICINE

## 2023-07-30 PROCEDURE — 85025 COMPLETE CBC W/AUTO DIFF WBC: CPT | Performed by: EMERGENCY MEDICINE

## 2023-07-30 PROCEDURE — 99284 EMERGENCY DEPT VISIT MOD MDM: CPT | Mod: ,,, | Performed by: EMERGENCY MEDICINE

## 2023-07-30 PROCEDURE — 83880 ASSAY OF NATRIURETIC PEPTIDE: CPT | Performed by: EMERGENCY MEDICINE

## 2023-07-30 PROCEDURE — 93005 ELECTROCARDIOGRAM TRACING: CPT

## 2023-07-30 PROCEDURE — 99284 PR EMERGENCY DEPT VISIT,LEVEL IV: ICD-10-PCS | Mod: ,,, | Performed by: EMERGENCY MEDICINE

## 2023-07-30 PROCEDURE — 84484 ASSAY OF TROPONIN QUANT: CPT | Performed by: EMERGENCY MEDICINE

## 2023-07-30 PROCEDURE — 99285 EMERGENCY DEPT VISIT HI MDM: CPT | Mod: 25

## 2023-07-30 PROCEDURE — 93010 EKG 12-LEAD: ICD-10-PCS | Mod: ,,, | Performed by: INTERNAL MEDICINE

## 2023-07-30 NOTE — ED TRIAGE NOTES
Pt states that she began to get SOB last night while she was sleeping. States that she is 25 weeks pregnant.

## 2023-07-30 NOTE — DISCHARGE INSTRUCTIONS
Return to emergency department for any worsening or further problems.  Follow up in clinic with OBGYN doctor or primary care provider in 2-3 days if symptoms persist.

## 2023-07-30 NOTE — ED PROVIDER NOTES
Encounter Date: 2023    SCRIBE #1 NOTE: I, Deedee Owen, am scribing for, and in the presence of,  Montana Ren MD. I have scribed the entire note.       History     Chief Complaint   Patient presents with    Shortness of Breath     The patient is a 27 y.o. female that presents to the emergency department due to chest pain. The patient explains that since last night  at 0145 she began experiencing shortness of breath. She denies any chest pain, leg swelling, fever, or cough. The patient is 25 weeks pregnant and states this is her 3rd child. No other symptoms or medical hx were reported.     The history is provided by the patient. No  was used.     Review of patient's allergies indicates:  No Known Allergies  No past medical history on file.  Past Surgical History:   Procedure Laterality Date     SECTION      x2     Family History   Problem Relation Age of Onset    Bone cancer Maternal Grandmother     Cancer Maternal Grandmother     Pancreatic cancer Maternal Grandfather     Cancer Maternal Grandfather     Diabetes Father     Hypertension Mother     Heart failure Mother     Diabetes Maternal Aunt     Cancer Maternal Aunt     Kidney failure Maternal Aunt     Diabetes Maternal Aunt     Stroke Maternal Aunt     Diabetes Maternal Aunt      Social History     Tobacco Use    Smoking status: Former     Current packs/day: 0.00    Smokeless tobacco: Never   Substance Use Topics    Alcohol use: Not Currently    Drug use: Not Currently     Review of Systems   Constitutional: Negative.  Negative for fever.   HENT: Negative.     Eyes: Negative.    Respiratory:  Positive for shortness of breath. Negative for cough.    Cardiovascular:  Negative for chest pain and leg swelling.   Gastrointestinal: Negative.    Endocrine: Negative.    Genitourinary: Negative.    Musculoskeletal: Negative.    Skin: Negative.    Neurological: Negative.    Hematological: Negative.    Psychiatric/Behavioral:  Negative.     All other systems reviewed and are negative.      Physical Exam     Initial Vitals [07/30/23 1455]   BP Pulse Resp Temp SpO2   122/79 84 16 97.8 °F (36.6 °C) 99 %      MAP       --         Physical Exam    Constitutional: She appears well-developed and well-nourished.   Eyes: Conjunctivae and EOM are normal. Pupils are equal, round, and reactive to light.   Neck: Neck supple.   Normal range of motion.  Cardiovascular:  Normal heart sounds.           Pulmonary/Chest: Breath sounds normal.   Abdominal: Abdomen is soft. Bowel sounds are normal.   Musculoskeletal:         General: Normal range of motion.      Cervical back: Normal range of motion and neck supple.     Neurological: She is alert and oriented to person, place, and time. She has normal strength and normal reflexes.   Skin: Skin is warm and dry.   Psychiatric: She has a normal mood and affect. Her behavior is normal. Judgment and thought content normal.         ED Course   Procedures  Labs Reviewed   COMPREHENSIVE METABOLIC PANEL - Abnormal; Notable for the following components:       Result Value    Sodium 133 (*)     BUN 5 (*)     Albumin 2.8 (*)     Globulin 4.8 (*)     All other components within normal limits   CBC WITH DIFFERENTIAL - Abnormal; Notable for the following components:    WBC 16.42 (*)     Hemoglobin 10.3 (*)     Hematocrit 32.8 (*)     MCV 73.4 (*)     MCH 23.0 (*)     MCHC 31.4 (*)     RDW 16.7 (*)     Neutrophils % 72.3 (*)     Lymphocytes % 20.6 (*)     Eosinophils % 0.7 (*)     Immature Granulocytes % 0.5 (*)     Neutrophils, Abs 11.86 (*)     Monocytes, Absolute 0.95 (*)     Immature Granulocytes, Absolute 0.08 (*)     All other components within normal limits   CBC MORPHOLOGY - Abnormal; Notable for the following components:    Platelet Morphology Few Large Platelets (*)     All other components within normal limits   TROPONIN I - Normal   NT-PRO NATRIURETIC PEPTIDE - Normal   CBC W/ AUTO DIFFERENTIAL    Narrative:      The following orders were created for panel order CBC auto differential.  Procedure                               Abnormality         Status                     ---------                               -----------         ------                     CBC with Differential[[id number]]        Abnormal            Final result                 Please view results for these tests on the individual orders.   EXTRA TUBES    Narrative:     The following orders were created for panel order EXTRA TUBES.  Procedure                               Abnormality         Status                     ---------                               -----------         ------                     Red Top Hold[[id number]]                                     In process                   Please view results for these tests on the individual orders.   RED TOP HOLD          Imaging Results              X-Ray Chest AP Portable (Final result)  Result time 07/30/23 15:49:06      Final result by Rick Brock DO (07/30/23 15:49:06)                   Impression:      No acute pulmonary disease      Electronically signed by: Rick Brock  Date:    07/30/2023  Time:    15:49               Narrative:    EXAMINATION:  XR CHEST AP PORTABLE    CLINICAL HISTORY:  Dyspnea;    TECHNIQUE:  XR CHEST AP PORTABLE    COMPARISON:  2014    FINDINGS:  No lines or tubes.    Lungs are clear.    Normal pleura.    Cardiac silhouette is similar to comparison exam.    No obvious acute bone findings.                                       Medications - No data to display             Attending Attestation:           Physician Attestation for Scribe:  Physician Attestation Statement for Scribe #1: I, Montana Ren MD, reviewed documentation, as scribed by Deedee Owen in my presence, and it is both accurate and complete.             ED Course as of 07/30/23 1607   Sun Jul 30, 2023   5164 Medical decision-making:  Differential diagnosis includes anxiety, pneumonia, CHF, pulmonary  embolism.  All labs imaging ordered and interpreted by me. [BB]   1554 Chest x-ray by my interpretation shows no acute disease.  Troponin is normal. [BB]   1603 Pro BNP is normal.  CMP is normal.  CBC is normal [place] [address] and mild anemia with hematocrit of 33. [BB]      ED Course User Index  [BB] Montana Ren MD                 Clinical Impression:   Final diagnoses:  [R06.02] Shortness of breath  [Z3A.25] 25 weeks gestation of pregnancy (Primary)        ED Disposition Condition    Discharge Stable          ED Prescriptions    None       Follow-up Information    None          Montana Ren MD  07/30/23 9279

## 2023-08-01 ENCOUNTER — TELEPHONE (OUTPATIENT)
Dept: EMERGENCY MEDICINE | Facility: HOSPITAL | Age: 28
End: 2023-08-01
Payer: MEDICAID

## 2023-08-02 ENCOUNTER — TELEPHONE (OUTPATIENT)
Dept: OBSTETRICS AND GYNECOLOGY | Facility: CLINIC | Age: 28
End: 2023-08-02
Payer: MEDICAID

## 2023-08-02 NOTE — TELEPHONE ENCOUNTER
----- Message from Candace Mendoza sent at 7/31/2023 10:12 AM CDT -----  Pt would like to speak with a nurse      [phone]

## 2023-08-02 NOTE — TELEPHONE ENCOUNTER
Returned call to pt/ Pt states she has been nauseated but she has not been taking the nausea medication prescribed by / informed pt to take medication as prescribed/ Pt verbalized understanding.

## 2023-08-08 ENCOUNTER — ROUTINE PRENATAL (OUTPATIENT)
Dept: OBSTETRICS AND GYNECOLOGY | Facility: CLINIC | Age: 28
End: 2023-08-08
Payer: MEDICAID

## 2023-08-08 VITALS
SYSTOLIC BLOOD PRESSURE: 128 MMHG | HEART RATE: 96 BPM | WEIGHT: 252.81 LBS | DIASTOLIC BLOOD PRESSURE: 72 MMHG | BODY MASS INDEX: 39.59 KG/M2

## 2023-08-08 DIAGNOSIS — O34.219 HISTORY OF CESAREAN DELIVERY, CURRENTLY PREGNANT: ICD-10-CM

## 2023-08-08 DIAGNOSIS — O23.42 URINARY TRACT INFECTION IN MOTHER DURING SECOND TRIMESTER OF PREGNANCY: ICD-10-CM

## 2023-08-08 DIAGNOSIS — Z36.9 ANTENATAL SCREENING ENCOUNTER: Primary | ICD-10-CM

## 2023-08-08 DIAGNOSIS — Z3A.26 26 WEEKS GESTATION OF PREGNANCY: ICD-10-CM

## 2023-08-08 LAB
BILIRUB SERPL-MCNC: NORMAL MG/DL
BLOOD, POC UA: NORMAL
GLUCOSE UR QL STRIP: NORMAL
KETONES UR QL STRIP: NORMAL
LEUKOCYTE ESTERASE URINE, POC: NORMAL
NITRITE, POC UA: NORMAL
PH, POC UA: 5.5
PROTEIN, POC: NORMAL
SPECIFIC GRAVITY, POC UA: 1.02
UROBILINOGEN, POC UA: 0.2

## 2023-08-08 PROCEDURE — 59425 PR ANTEPARTUM CARE ONLY, 4-6 VISITS: ICD-10-PCS | Mod: TH,,, | Performed by: OBSTETRICS & GYNECOLOGY

## 2023-08-08 PROCEDURE — 59425 ANTEPARTUM CARE ONLY: CPT | Mod: TH,,, | Performed by: OBSTETRICS & GYNECOLOGY

## 2023-08-08 RX ORDER — NITROFURANTOIN 25; 75 MG/1; MG/1
100 CAPSULE ORAL 2 TIMES DAILY
Qty: 20 CAPSULE | Refills: 0 | Status: SHIPPED | OUTPATIENT
Start: 2023-08-08 | End: 2023-08-18

## 2023-08-08 NOTE — PROGRESS NOTES
28 y.o. female  at 26w3d   Reports fetal movement or fluttering. Denies any vaginal bleeding, leakage of fluid, cramping, contractions, or pressure.   She complains of nothing.  Pt states she is doing well without any concerns.     Vitals  BP: 128/72  Pulse: 96  Weight: 114.7 kg (252 lb 12.8 oz)  Prenatal  Fundal Height (cm): 26 cm  Fetal Heart Rate: 150s  Movement: Present  Urine Albumin/Glucose  Urine Albumin: Negative  Urine Glucose: Negative  Edema  LLE Edema: None  RLE Edema: None  Facial: None  Additional Edema?: No    Prenatal Labs:  Lab Results   Component Value Date    GROUPTRH O POS 2023    HGB 10.3 (L) 2023    HCT 32.8 (L) 2023     2023    SICKLE Negative 2023    HEPBSAG Non-Reactive 2023    [id number] Non-Reactive 2023    LABNGO Negative 2023    LABURIN Skin/Urogenital Mae Isolated, no further workup. 2023    [id number] Negative 2020       The following were addressed during this visit:    25-28 Weeks  -  Labor Signs   - Childbirth Education   - Maternity Leave paperwork   - Smoking Intervention   - Weight Gain/Diet/Exercise   - Rhogam Given   - Rooming in baby during your hospital stay       Daily fetal kick counts, bleeding, and  labor/labor precautions discussed.  Questions answered to desired level of satisfaction  Verbalized understanding to all information and instructions provided.    Total weight gain/weight loss in pregnancy: Not found.     Follow up in about 2 weeks (around 2023) for w/ 1 hr GTT, 4 wks reshma.    A: 26w3d     ICD-10-CM ICD-9-CM    1.  screening encounter  Z36.9 V28.9 POCT Urinalysis      Glucose, 1Hr Post Prandial      2. 26 weeks gestation of pregnancy  Z3A.26 V22.2 POCT Urinalysis      3. History of  delivery, currently pregnant  O34.219 654.20       4. Urinary tract infection in mother during second trimester of pregnancy  O23.42 646.63 nitrofurantoin,  macrocrystal-monohydrate, (MACROBID) 100 MG capsule     599.0             iJm England M.D., FCOG    OB/GYN

## 2023-08-28 ENCOUNTER — HOSPITAL ENCOUNTER (EMERGENCY)
Facility: HOSPITAL | Age: 28
Discharge: HOME | End: 2023-08-28
Attending: INTERNAL MEDICINE
Payer: MEDICAID

## 2023-08-28 VITALS
SYSTOLIC BLOOD PRESSURE: 129 MMHG | OXYGEN SATURATION: 100 % | RESPIRATION RATE: 16 BRPM | HEART RATE: 94 BPM | HEIGHT: 67 IN | DIASTOLIC BLOOD PRESSURE: 69 MMHG | WEIGHT: 245 LBS | TEMPERATURE: 97 F | BODY MASS INDEX: 38.45 KG/M2

## 2023-08-28 DIAGNOSIS — Z20.822 CLOSE EXPOSURE TO COVID-19 VIRUS: Primary | ICD-10-CM

## 2023-08-28 DIAGNOSIS — Z3A.29 29 WEEKS GESTATION OF PREGNANCY: ICD-10-CM

## 2023-08-28 LAB — SARS-COV-2 RDRP RESP QL NAA+PROBE: NEGATIVE

## 2023-08-28 PROCEDURE — 99284 EMERGENCY DEPT VISIT MOD MDM: CPT

## 2023-08-28 PROCEDURE — 99283 PR EMERGENCY DEPT VISIT,LEVEL III: ICD-10-PCS | Mod: ,,, | Performed by: INTERNAL MEDICINE

## 2023-08-28 PROCEDURE — 87635 SARS-COV-2 COVID-19 AMP PRB: CPT | Performed by: INTERNAL MEDICINE

## 2023-08-28 PROCEDURE — 99283 EMERGENCY DEPT VISIT LOW MDM: CPT | Mod: ,,, | Performed by: INTERNAL MEDICINE

## 2023-08-28 NOTE — ED PROVIDER NOTES
Encounter Date: 2023       History     Chief Complaint   Patient presents with    Cold Exposure     cold     Patient comes in with positive COVID exposure upper respiratory symptoms.  The patient is 29 weeks intrauterine pregnancy followed by Dr. Hair at [facility].  She denies any abdominal pain, vaginal bleeding pregnancy related issues.        Review of patient's allergies indicates:  No Known Allergies  History reviewed. No pertinent past medical history.  Past Surgical History:   Procedure Laterality Date     SECTION      x2     Family History   Problem Relation Age of Onset    Bone cancer Maternal Grandmother     Cancer Maternal Grandmother     Pancreatic cancer Maternal Grandfather     Cancer Maternal Grandfather     Diabetes Father     Hypertension Mother     Heart failure Mother     Diabetes Maternal Aunt     Cancer Maternal Aunt     Kidney failure Maternal Aunt     Diabetes Maternal Aunt     Stroke Maternal Aunt     Diabetes Maternal Aunt      Social History     Tobacco Use    Smoking status: Former    Smokeless tobacco: Never   Substance Use Topics    Alcohol use: Not Currently    Drug use: Not Currently     Review of Systems   Constitutional:  Negative for fever.   HENT:  Negative for sore throat.    Respiratory:  Negative for shortness of breath.    Cardiovascular:  Negative for chest pain.   Gastrointestinal:  Negative for nausea.   Genitourinary:  Negative for dysuria.   Musculoskeletal:  Negative for back pain.   Skin:  Negative for rash.   Neurological:  Negative for weakness.   Hematological:  Does not bruise/bleed easily.       Physical Exam     Initial Vitals [23 1231]   BP Pulse Resp Temp SpO2   129/69 94 16 97.4 °F (36.3 °C) 100 %      MAP       --         Physical Exam    Vitals reviewed.  Constitutional: She appears well-developed.   HENT:   Nose: Mucosal edema present.   Mouth/Throat: Uvula is midline, oropharynx is clear and moist and mucous membranes are normal.    Eyes: Pupils are equal, round, and reactive to light.   Neck:   Normal range of motion.  Cardiovascular:  Normal rate.           Pulmonary/Chest: Breath sounds normal.   Abdominal: Abdomen is soft. Bowel sounds are normal. There is no abdominal tenderness.   Gravid   Musculoskeletal:         General: Normal range of motion.      Cervical back: Normal range of motion.     Neurological: She is alert.   Skin: Skin is warm.   Psychiatric: She has a normal mood and affect.         Medical Screening Exam   See Full Note    ED Course   Procedures  Labs Reviewed   SARS-COV-2 RNA AMPLIFICATION, QUAL - Normal    Narrative:     Negative SARS-CoV results should not be used as the sole basis for treatment or patient management decisions; negative results should be considered in the context of a patient's recent exposures, history and the presene of clinical signs and symptoms consistent with COVID-19.  Negative results should be treated as presumptive and confirmed by molecular assay, if necessary for patient management.          Imaging Results    None          Medications - No data to display  Medical Decision Making  Patient is 29 weeks pregnant with positive COVID exposure, rule out COVID.  Denies any pregnancy related complaints or problems at this time.    Amount and/or Complexity of Data Reviewed  Labs:  Decision-making details documented in ED Course.  Discussion of management or test interpretation with external provider(s): COVID is negative.  Patient to follow with her OBGYN in the next 24-48 hours for rechecked and further workup evaluation.               ED Course as of 08/28/23 1306   Mon Aug 28, 2023   1304 ID NOW COVID-19, (BOO): Negative [PW]      ED Course User Index  [PW] Ronnie Snider MD                    Clinical Impression:   Final diagnoses:  [Z20.822] Close exposure to COVID-19 virus (Primary)  [Z3A.29] 29 weeks gestation of pregnancy        ED Disposition Condition    Discharge Stable          ED  Prescriptions    None       Follow-up Information       Follow up With Specialties Details Why Contact Info    Jim England MD Obstetrics and Gynecology In 1 day  [address] [place] [address]  [facility] [address]  [phone]               Ronnie Snider MD  08/28/23 7628

## 2023-09-05 ENCOUNTER — ROUTINE PRENATAL (OUTPATIENT)
Dept: OBSTETRICS AND GYNECOLOGY | Facility: CLINIC | Age: 28
End: 2023-09-05
Payer: COMMERCIAL

## 2023-09-05 VITALS
WEIGHT: 252 LBS | DIASTOLIC BLOOD PRESSURE: 79 MMHG | HEART RATE: 78 BPM | BODY MASS INDEX: 39.47 KG/M2 | SYSTOLIC BLOOD PRESSURE: 123 MMHG

## 2023-09-05 DIAGNOSIS — O34.219 HISTORY OF CESAREAN DELIVERY, CURRENTLY PREGNANT: ICD-10-CM

## 2023-09-05 DIAGNOSIS — Z3A.30 30 WEEKS GESTATION OF PREGNANCY: Primary | ICD-10-CM

## 2023-09-05 PROCEDURE — 0502F SUBSEQUENT PRENATAL CARE: CPT | Mod: ,,, | Performed by: OBSTETRICS & GYNECOLOGY

## 2023-09-05 PROCEDURE — 0502F PR SUBSEQUENT PRENATAL CARE: ICD-10-PCS | Mod: ,,, | Performed by: OBSTETRICS & GYNECOLOGY

## 2023-09-05 NOTE — PROGRESS NOTES
28 y.o. female  at 30w3d   Reports fetal movement or fluttering. Denies any vaginal bleeding, leakage of fluid, cramping, contractions, or pressure.   She complains of nothing.  Pt states she is doing well without any concerns.     Vitals  BP: 123/79  Pulse: 78  Weight: 114.3 kg (252 lb)  Prenatal  Fundal Height (cm): 30 cm  Fetal Heart Rate: 1540s  Movement: Present  Urine Albumin/Glucose  Urine Albumin: 1+  Urine Glucose: Negative  Edema  LLE Edema: None  RLE Edema: None  Facial: None  Additional Edema?: No    Prenatal Labs:  Lab Results   Component Value Date    GROUPTRH O POS 2023    HGB 10.6 (L) 2023    HCT 33.8 (L) 2023     2023    SICKLE Negative 2023    HEPBSAG Non-Reactive 2023    [id number] Non-Reactive 2023    LABNGO Negative 2023    LABURIN Skin/Urogenital Mae Isolated, no further workup. 2023    [id number] Negative 2020       The following were addressed during this visit:    29-32 Weeks  - Tdap Given   - Contraception/Tubal Consent   - Pre-registration   - Circumsision plans   - Op note review/ consent   - Birth Plan   - Pediatrician   - Fetal Kick Counts/PIH/PTL precautions   - Preeclampsia Education   - Quiet time       Daily fetal kick counts, bleeding, and  labor/labor precautions discussed.  Questions answered to desired level of satisfaction  Verbalized understanding to all information and instructions provided.    Total weight gain/weight loss in pregnancy: Not found.     Follow up in about 2 weeks (around 2023) for LEISA.    A: 30w3d     ICD-10-CM ICD-9-CM    1. 30 weeks gestation of pregnancy  Z3A.30 V22.2 POCT Urinalysis      2. History of  delivery, currently pregnant  O34.219 654.20             Jim England M.D., FCOG    OB/GYN

## 2023-09-19 ENCOUNTER — PROCEDURE VISIT (OUTPATIENT)
Dept: OBSTETRICS AND GYNECOLOGY | Facility: CLINIC | Age: 28
End: 2023-09-19
Payer: MEDICAID

## 2023-09-19 ENCOUNTER — ROUTINE PRENATAL (OUTPATIENT)
Dept: OBSTETRICS AND GYNECOLOGY | Facility: CLINIC | Age: 28
End: 2023-09-19
Payer: COMMERCIAL

## 2023-09-19 VITALS
HEART RATE: 81 BPM | BODY MASS INDEX: 38.97 KG/M2 | SYSTOLIC BLOOD PRESSURE: 134 MMHG | DIASTOLIC BLOOD PRESSURE: 79 MMHG | WEIGHT: 248.81 LBS

## 2023-09-19 DIAGNOSIS — Z3A.32 32 WEEKS GESTATION OF PREGNANCY: Primary | ICD-10-CM

## 2023-09-19 DIAGNOSIS — O36.8130 DECREASED FETAL MOVEMENTS IN THIRD TRIMESTER, SINGLE OR UNSPECIFIED FETUS: ICD-10-CM

## 2023-09-19 DIAGNOSIS — O34.219 HISTORY OF CESAREAN DELIVERY, CURRENTLY PREGNANT: ICD-10-CM

## 2023-09-19 LAB
BILIRUB SERPL-MCNC: NORMAL MG/DL
BLOOD, POC UA: NORMAL
GLUCOSE UR QL STRIP: NORMAL
KETONES UR QL STRIP: NORMAL
LEUKOCYTE ESTERASE URINE, POC: NORMAL
NITRITE, POC UA: NORMAL
PH, POC UA: 6.5
PROTEIN, POC: NORMAL
SPECIFIC GRAVITY, POC UA: 1.02
UROBILINOGEN, POC UA: 1

## 2023-09-19 PROCEDURE — 99499 UNLISTED E&M SERVICE: CPT | Mod: ,,, | Performed by: OBSTETRICS & GYNECOLOGY

## 2023-09-19 PROCEDURE — 99499 NO LOS: ICD-10-PCS | Mod: ,,, | Performed by: OBSTETRICS & GYNECOLOGY

## 2023-09-19 PROCEDURE — 76819 PR US, OB, FETAL BIOPHYSICAL, W/O NST: ICD-10-PCS | Mod: ,,, | Performed by: OBSTETRICS & GYNECOLOGY

## 2023-09-19 PROCEDURE — 0502F SUBSEQUENT PRENATAL CARE: CPT | Mod: ,,, | Performed by: OBSTETRICS & GYNECOLOGY

## 2023-09-19 PROCEDURE — 76805 OB US >/= 14 WKS SNGL FETUS: CPT | Mod: ,,, | Performed by: OBSTETRICS & GYNECOLOGY

## 2023-09-19 PROCEDURE — 76819 FETAL BIOPHYS PROFIL W/O NST: CPT | Mod: ,,, | Performed by: OBSTETRICS & GYNECOLOGY

## 2023-09-19 PROCEDURE — 76805 PR US, OB 14+WKS, TRANSABD, SINGLE GESTATION: ICD-10-PCS | Mod: ,,, | Performed by: OBSTETRICS & GYNECOLOGY

## 2023-09-19 PROCEDURE — 0502F PR SUBSEQUENT PRENATAL CARE: ICD-10-PCS | Mod: ,,, | Performed by: OBSTETRICS & GYNECOLOGY

## 2023-09-19 NOTE — PROGRESS NOTES
28 y.o. female  at 32w3d   Reports fetal movement or fluttering. Denies any vaginal bleeding, leakage of fluid, cramping, contractions, or pressure.   She complains of no problems.  Pt states she is doing well without any concerns.     Vitals  BP: 134/79  Pulse: 81  Weight: 112.9 kg (248 lb 12.8 oz)  Prenatal  Fundal Height (cm): 33 cm  Fetal Heart Rate: 140s  Movement: Present  Urine Albumin/Glucose  Urine Albumin: Trace  Urine Glucose: Negative  Edema  LLE Edema: None  RLE Edema: None  Facial: None  Additional Edema?: No    Prenatal Labs:  Lab Results   Component Value Date    GROUPTRH O POS 2023    HGB 10.6 (L) 2023    HCT 33.8 (L) 2023     2023    SICKLE Negative 2023    HEPBSAG Non-Reactive 2023    [id number] Non-Reactive 2023    LABNGO Negative 2023    LABURIN Skin/Urogenital Mae Isolated, no further workup. 2023    [id number] Negative 2020       The following were addressed during this visit:    33-36 Weeks  - Childbirth Education/Hospital Tours   - Breastfeeding   - Group B Strep Test/HIV/RPR   - Fetal Kick Counts/PIH/PTL precautions       Daily fetal kick counts, bleeding, and  labor/labor precautions discussed.  Questions answered to desired level of satisfaction  Verbalized understanding to all information and instructions provided.    Total weight gain/weight loss in pregnancy: Not found.     Follow up in about 2 weeks (around 10/3/2023) for LEISA in 2 weeks and Annual in 3 months..    A: 32w3d     ICD-10-CM ICD-9-CM    1. 32 weeks gestation of pregnancy  Z3A.32 V22.2 POCT Urinalysis      US OB 14+ Weeks TransAbd, w/Biophysical Profile, w/o NST, Single Gestation (xpd)      2. History of  delivery, currently pregnant  O34.219 654.20             Jim England M.D., FCOG    OB/GYN

## 2023-09-29 ENCOUNTER — HOSPITAL ENCOUNTER (EMERGENCY)
Facility: HOSPITAL | Age: 28
Discharge: HOME | End: 2023-09-29
Attending: SPECIALIST | Admitting: SPECIALIST
Payer: MEDICAID

## 2023-09-29 VITALS
HEART RATE: 92 BPM | TEMPERATURE: 99 F | OXYGEN SATURATION: 98 % | DIASTOLIC BLOOD PRESSURE: 77 MMHG | HEIGHT: 67 IN | RESPIRATION RATE: 20 BRPM | SYSTOLIC BLOOD PRESSURE: 118 MMHG | BODY MASS INDEX: 37.64 KG/M2 | WEIGHT: 239.81 LBS

## 2023-09-29 DIAGNOSIS — O47.9 IRREGULAR UTERINE CONTRACTIONS: Primary | ICD-10-CM

## 2023-09-29 LAB
BACTERIA #/AREA URNS HPF: ABNORMAL /HPF
BILIRUB UR QL STRIP: NEGATIVE
CLARITY UR: ABNORMAL
COLOR UR: YELLOW
GLUCOSE UR STRIP-MCNC: NORMAL MG/DL
KETONES UR STRIP-SCNC: NEGATIVE MG/DL
LEUKOCYTE ESTERASE UR QL STRIP: ABNORMAL
MUCOUS, UA: ABNORMAL /LPF
NITRITE UR QL STRIP: NEGATIVE
PH UR STRIP: 5.5 PH UNITS
PROT UR QL STRIP: 20
RBC # UR STRIP: NEGATIVE /UL
RBC #/AREA URNS HPF: 2 /HPF
SP GR UR STRIP: 1.02
SQUAMOUS #/AREA URNS LPF: ABNORMAL /HPF
UROBILINOGEN UR STRIP-ACNC: 6 MG/DL
WBC #/AREA URNS HPF: 35 /HPF

## 2023-09-29 PROCEDURE — 81001 URINALYSIS AUTO W/SCOPE: CPT | Performed by: SPECIALIST

## 2023-09-29 PROCEDURE — 99283 EMERGENCY DEPT VISIT LOW MDM: CPT

## 2023-09-29 PROCEDURE — 87186 SC STD MICRODIL/AGAR DIL: CPT | Performed by: SPECIALIST

## 2023-09-29 PROCEDURE — 87077 CULTURE AEROBIC IDENTIFY: CPT | Performed by: SPECIALIST

## 2023-09-29 NOTE — ED NOTES
Call placed to Dr. Parra. Informed Dr. Parra pt is a  at approx. 33.6 IUP wk gest with c/o constant lower abd pain that began this afternoon. Pt. denies vaginal bleeding or leakage. Pt. States + fetal movement. Order recd for UA. Dr. Parra states will be out to evaluate pt shortly.

## 2023-09-29 NOTE — DISCHARGE INSTRUCTIONS
Patient with irregular uterine contractions.  Her urine shoes moderate leukocytes and few bacteria.  Patient has uterine tract infection.  We will discharge her home on Macrobid twice a day.  She has an appointment to follow up with Dr. Hair on Tuesday.

## 2023-09-29 NOTE — FIRST PROVIDER EVALUATION
"Medical screening examination initiated.  I have conducted a focused provider triage encounter, findings are as follows:    Brief history of present illness:  Patient is a 28-year-old  3 para 2 with a history of previous  section x2 who presents to labor and delivery at 33 weeks and 6 days with lower abdominal pain.  The patient states that she just got off work.  She denies any vaginal bleeding or leakage of amniotic fluid.  She admits to good fetal movement.  Her pain is in her lower abdomen and radiates to the back especially to the left lower back.    Vitals:    23 1551   BP: 118/77   BP Location: Left arm   Patient Position: Lying   Pulse: 92   Resp: 20   Temp: 98.6 °F (37 °C)   TempSrc: Oral   SpO2: 98%   Weight: 108.8 kg (239 lb 12.8 oz)   Height: 5' 7" (1.702 m)       Pertinent physical exam:  On physical exam the patient is a favorable vital signs is stable.  Neck is supple.  Lungs are clear to auscultation bilaterally.  Cardiovascular is regular rate and rhythm.  Abdomen is soft nontender nondistended positive bowel sounds.  Extremities without cyanosis without clubbing without edema.  Sterile vaginal exam is cervix is closed thick and high with cephalic presentation.    Brief workup plan:  The patient is placed on the monitor for possible contractions.  Urinalysis has been sent to evaluate the patient for urinary tract infection.    Preliminary workup initiated; this workup will be continued and followed by the physician or advanced practice provider that is assigned to the patient when roomed.  "

## 2023-10-01 LAB — UA COMPLETE W REFLEX CULTURE PNL UR: ABNORMAL

## 2023-10-08 NOTE — ED PROVIDER NOTES
Encounter Date: 2023       History     Chief Complaint   Patient presents with    Contractions     HPI  Review of patient's allergies indicates:  No Known Allergies  No past medical history on file.  Past Surgical History:   Procedure Laterality Date     SECTION      x2     Family History   Problem Relation Age of Onset    Bone cancer Maternal Grandmother     Cancer Maternal Grandmother     Pancreatic cancer Maternal Grandfather     Cancer Maternal Grandfather     Diabetes Father     Hypertension Mother     Heart failure Mother     Diabetes Maternal Aunt     Cancer Maternal Aunt     Kidney failure Maternal Aunt     Diabetes Maternal Aunt     Stroke Maternal Aunt     Diabetes Maternal Aunt      Social History     Tobacco Use    Smoking status: Former    Smokeless tobacco: Never   Substance Use Topics    Alcohol use: Not Currently    Drug use: Not Currently     Review of Systems    Physical Exam     Initial Vitals [23 1551]   BP Pulse Resp Temp SpO2   118/77 92 20 98.6 °F (37 °C) 98 %      MAP       --         Physical Exam    ED Course   Procedures  Labs Reviewed   CULTURE, URINE - Abnormal; Notable for the following components:       Result Value    Culture, Urine >100,000 Klebsiella pneumoniae (*)     All other components within normal limits   URINALYSIS, REFLEX TO URINE CULTURE - Abnormal; Notable for the following components:    Leukocytes, UA Moderate (*)     Protein, UA 20 (*)     Urobilinogen, UA 6 (*)     All other components within normal limits   URINALYSIS, MICROSCOPIC - Abnormal; Notable for the following components:    WBC, UA 35 (*)     Bacteria, UA Occasional (*)     Squamous Epithelial Cells, UA Moderate (*)     Mucous Occasional (*)     All other components within normal limits          Imaging Results    None          Medications - No data to display  Medical Decision Making                             Clinical Impression:   Final diagnoses:  [O47.9] Irregular uterine  contractions (Primary)        ED Disposition Condition    Discharge Stable          ED Prescriptions    None       Follow-up Information    None          Ryan Parra MD  10/07/23 9362

## 2023-10-09 ENCOUNTER — ROUTINE PRENATAL (OUTPATIENT)
Dept: OBSTETRICS AND GYNECOLOGY | Facility: CLINIC | Age: 28
End: 2023-10-09
Payer: MEDICAID

## 2023-10-09 VITALS
BODY MASS INDEX: 38.56 KG/M2 | HEART RATE: 95 BPM | SYSTOLIC BLOOD PRESSURE: 120 MMHG | WEIGHT: 246.19 LBS | DIASTOLIC BLOOD PRESSURE: 72 MMHG

## 2023-10-09 DIAGNOSIS — Z36.89 ENCOUNTER FOR OTHER SPECIFIED ANTENATAL SCREENING: ICD-10-CM

## 2023-10-09 DIAGNOSIS — Z11.3 SCREEN FOR STD (SEXUALLY TRANSMITTED DISEASE): ICD-10-CM

## 2023-10-09 DIAGNOSIS — Z72.51 HIGH RISK SEXUAL BEHAVIOR, UNSPECIFIED TYPE: ICD-10-CM

## 2023-10-09 DIAGNOSIS — Z3A.35 35 WEEKS GESTATION OF PREGNANCY: Primary | ICD-10-CM

## 2023-10-09 DIAGNOSIS — O34.219 HISTORY OF CESAREAN DELIVERY, CURRENTLY PREGNANT: ICD-10-CM

## 2023-10-09 DIAGNOSIS — Z36.85 ENCOUNTER FOR ANTENATAL SCREENING FOR STREPTOCOCCUS B: ICD-10-CM

## 2023-10-09 DIAGNOSIS — Z36.9 ANTENATAL SCREENING ENCOUNTER: ICD-10-CM

## 2023-10-09 LAB
ANISOCYTOSIS BLD QL SMEAR: ABNORMAL
BACTERIA VAG QL WET PREP: ABNORMAL /HPF
BASOPHILS # BLD AUTO: 0.04 K/UL (ref 0–0.2)
BASOPHILS NFR BLD AUTO: 0.3 % (ref 0–1)
BILIRUB SERPL-MCNC: NORMAL MG/DL
BLOOD, POC UA: NORMAL
CLUE CELLS VAG QL WET PREP: ABNORMAL /HPF
DIFFERENTIAL METHOD BLD: ABNORMAL
EOSINOPHIL # BLD AUTO: 0.1 K/UL (ref 0–0.5)
EOSINOPHIL NFR BLD AUTO: 0.9 % (ref 1–4)
ERYTHROCYTE [DISTWIDTH] IN BLOOD BY AUTOMATED COUNT: 16.6 % (ref 11.5–14.5)
GLUCOSE UR QL STRIP: NORMAL
HCT VFR BLD AUTO: 32.9 % (ref 38–47)
HGB BLD-MCNC: 10.4 G/DL (ref 12–16)
IMM GRANULOCYTES # BLD AUTO: 0.12 K/UL (ref 0–0.04)
IMM GRANULOCYTES NFR BLD: 1 % (ref 0–0.4)
KETONES UR QL STRIP: NORMAL
LEUKOCYTE ESTERASE URINE, POC: NORMAL
LYMPHOCYTES # BLD AUTO: 2.92 K/UL (ref 1–4.8)
LYMPHOCYTES NFR BLD AUTO: 24.9 % (ref 27–41)
MCH RBC QN AUTO: 23.4 PG (ref 27–31)
MCHC RBC AUTO-ENTMCNC: 31.6 G/DL (ref 32–36)
MCV RBC AUTO: 74.1 FL (ref 80–96)
MICROCYTES BLD QL SMEAR: ABNORMAL
MONOCYTES # BLD AUTO: 0.71 K/UL (ref 0–0.8)
MONOCYTES NFR BLD AUTO: 6.1 % (ref 2–6)
MPC BLD CALC-MCNC: 11.5 FL (ref 9.4–12.4)
NEUTROPHILS # BLD AUTO: 7.82 K/UL (ref 1.8–7.7)
NEUTROPHILS NFR BLD AUTO: 66.8 % (ref 53–65)
NITRITE, POC UA: NORMAL
NRBC # BLD AUTO: 0 X10E3/UL
NRBC, AUTO (.00): 0 %
PH, POC UA: 6
PLATELET # BLD AUTO: 280 K/UL (ref 150–400)
PLATELET MORPHOLOGY: ABNORMAL
PROTEIN, POC: NORMAL
RBC # BLD AUTO: 4.44 M/UL (ref 4.2–5.4)
RBC #/AREA VAG WET PREP: ABNORMAL /HPF
SPECIFIC GRAVITY, POC UA: 1.02
SQUAMOUS EPITHELIALS WET WOUNT, GENITAL: ABNORMAL /HPF
SYPHILIS AB INTERPRETATION: NORMAL
T VAGINALIS VAG QL WET PREP: ABNORMAL /HPF
UROBILINOGEN, POC UA: 0.2
WBC # BLD AUTO: 11.71 K/UL (ref 4.5–11)
WBC CLUMPS WET MOUNT, GENITAL: ABNORMAL /HPF
WBC VAG QL WET PREP: ABNORMAL /HPF
YEAST VAG QL WET PREP: ABNORMAL /HPF

## 2023-10-09 PROCEDURE — 87653 STREP B DNA AMP PROBE: CPT | Mod: ,,, | Performed by: CLINICAL MEDICAL LABORATORY

## 2023-10-09 PROCEDURE — 87591 CHLAMYDIA/GONORRHOEAE(GC), PCR: ICD-10-PCS | Mod: ,,, | Performed by: CLINICAL MEDICAL LABORATORY

## 2023-10-09 PROCEDURE — 87491 CHLAMYDIA/GONORRHOEAE(GC), PCR: ICD-10-PCS | Mod: ,,, | Performed by: CLINICAL MEDICAL LABORATORY

## 2023-10-09 PROCEDURE — 85025 COMPLETE CBC W/AUTO DIFF WBC: CPT | Mod: ,,, | Performed by: CLINICAL MEDICAL LABORATORY

## 2023-10-09 PROCEDURE — 80305 POCT URINE DRUG SCREEN PRESUMP: ICD-10-PCS | Mod: QW,,, | Performed by: OBSTETRICS & GYNECOLOGY

## 2023-10-09 PROCEDURE — 87491 CHLMYD TRACH DNA AMP PROBE: CPT | Mod: ,,, | Performed by: CLINICAL MEDICAL LABORATORY

## 2023-10-09 PROCEDURE — 86780 TREPONEMA PALLIDUM: CPT | Mod: ,,, | Performed by: CLINICAL MEDICAL LABORATORY

## 2023-10-09 PROCEDURE — 59426 PR ANTEPARTUM CARE ONLY, >7 VISITS: ICD-10-PCS | Mod: TH,,, | Performed by: OBSTETRICS & GYNECOLOGY

## 2023-10-09 PROCEDURE — 36415 PR COLLECTION VENOUS BLOOD,VENIPUNCTURE: ICD-10-PCS | Mod: ,,, | Performed by: OBSTETRICS & GYNECOLOGY

## 2023-10-09 PROCEDURE — 80305 DRUG TEST PRSMV DIR OPT OBS: CPT | Mod: QW,,, | Performed by: OBSTETRICS & GYNECOLOGY

## 2023-10-09 PROCEDURE — 85025 CBC WITH DIFFERENTIAL: ICD-10-PCS | Mod: ,,, | Performed by: CLINICAL MEDICAL LABORATORY

## 2023-10-09 PROCEDURE — 86780 TREPONEMA PALLIDUM (SYPHILIS) ANTIBODY: ICD-10-PCS | Mod: ,,, | Performed by: CLINICAL MEDICAL LABORATORY

## 2023-10-09 PROCEDURE — 59426 ANTEPARTUM CARE ONLY: CPT | Mod: TH,,, | Performed by: OBSTETRICS & GYNECOLOGY

## 2023-10-09 PROCEDURE — 36415 COLL VENOUS BLD VENIPUNCTURE: CPT | Mod: ,,, | Performed by: OBSTETRICS & GYNECOLOGY

## 2023-10-09 PROCEDURE — 87210 SMEAR WET MOUNT SALINE/INK: CPT | Mod: ,,, | Performed by: CLINICAL MEDICAL LABORATORY

## 2023-10-09 PROCEDURE — 87653 STREP B SCREEN, ANTEPARTUM: ICD-10-PCS | Mod: ,,, | Performed by: CLINICAL MEDICAL LABORATORY

## 2023-10-09 PROCEDURE — 87591 N.GONORRHOEAE DNA AMP PROB: CPT | Mod: ,,, | Performed by: CLINICAL MEDICAL LABORATORY

## 2023-10-09 PROCEDURE — 87210 WET PREP, GENITAL: ICD-10-PCS | Mod: ,,, | Performed by: CLINICAL MEDICAL LABORATORY

## 2023-10-09 RX ORDER — SODIUM CHLORIDE, SODIUM LACTATE, POTASSIUM CHLORIDE, CALCIUM CHLORIDE 600; 310; 30; 20 MG/100ML; MG/100ML; MG/100ML; MG/100ML
INJECTION, SOLUTION INTRAVENOUS CONTINUOUS
Status: CANCELLED | OUTPATIENT
Start: 2023-10-09

## 2023-10-09 RX ORDER — OXYTOCIN/RINGER'S LACTATE 30/500 ML
334 PLASTIC BAG, INJECTION (ML) INTRAVENOUS ONCE
Status: CANCELLED | OUTPATIENT
Start: 2023-10-09 | End: 2023-10-09

## 2023-10-09 RX ORDER — OXYTOCIN/RINGER'S LACTATE 30/500 ML
95 PLASTIC BAG, INJECTION (ML) INTRAVENOUS ONCE
Status: CANCELLED | OUTPATIENT
Start: 2023-10-09 | End: 2023-10-09

## 2023-10-09 RX ORDER — MUPIROCIN 20 MG/G
OINTMENT TOPICAL
Status: CANCELLED | OUTPATIENT
Start: 2023-10-09

## 2023-10-09 RX ORDER — SODIUM CITRATE AND CITRIC ACID MONOHYDRATE 334; 500 MG/5ML; MG/5ML
30 SOLUTION ORAL
Status: CANCELLED | OUTPATIENT
Start: 2023-10-09

## 2023-10-09 RX ORDER — FAMOTIDINE 10 MG/ML
20 INJECTION INTRAVENOUS
Status: CANCELLED | OUTPATIENT
Start: 2023-10-09

## 2023-10-09 RX ORDER — METHYLERGONOVINE MALEATE 0.2 MG/ML
200 INJECTION INTRAVENOUS
Status: CANCELLED | OUTPATIENT
Start: 2023-10-09

## 2023-10-09 RX ORDER — CARBOPROST TROMETHAMINE 250 UG/ML
250 INJECTION, SOLUTION INTRAMUSCULAR
Status: CANCELLED | OUTPATIENT
Start: 2023-10-09

## 2023-10-09 RX ORDER — MISOPROSTOL 100 UG/1
800 TABLET ORAL
Status: CANCELLED | OUTPATIENT
Start: 2023-10-09

## 2023-10-09 NOTE — PROGRESS NOTES
28 y.o. female  at 35w2d   Reports fetal movement or fluttering. Denies any vaginal bleeding, leakage of fluid, cramping, contractions, or pressure.   She complains of no problems/  Pt states she is doing well without any concerns.     Pt is scheduled for repeat  at [facility] on 2023.    Vitals  BP: 120/72  Pulse: 95  Weight: 111.7 kg (246 lb 3.2 oz)  Prenatal  Fundal Height (cm): 35 cm  Fetal Heart Rate: 140s  Movement: Present  Urine Albumin/Glucose  Urine Albumin: Negative  Urine Glucose: Negative  Edema  LLE Edema: None  RLE Edema: None  Facial: None  Additional Edema?: No  Cervical Exam  Dilation: Closed  Effacement (%): 50  Station: Ballotable  Presentation: Vertex  Station (Labor Curve): 9 cm  Dilation/Effacement/Station  Dilation: Closed  Effacement (%): 50  Station: Ballotable    Prenatal Labs:  Lab Results   Component Value Date    GROUPTRH O POS 2023    HGB 10.6 (L) 2023    HCT 33.8 (L) 2023     2023    SICKLE Negative 2023    HEPBSAG Non-Reactive 2023    [id number] Non-Reactive 2023    LABNGO Negative 2023    LABURIN >100,000 Klebsiella pneumoniae (A) 2023    [id number] Negative 2020       No pregnancy checklist tasks were completed during this visit, and no tasks are pending completion.      Daily fetal kick counts, bleeding, and  labor/labor precautions discussed.  Questions answered to desired level of satisfaction  Verbalized understanding to all information and instructions provided.    Total weight gain/weight loss in pregnancy: Not found.     Follow up for LEISA.    A: 35w2d     ICD-10-CM ICD-9-CM    1. 35 weeks gestation of pregnancy  Z3A.35 V22.2 POCT Urine Drug Screen Presump      CBC Auto Differential      Treponema Pallidum (Syphillis) Antibody      Chlamydia/GC, PCR      Bacterial Vaginosis      Strep B Screen, Antepartum      POCT Urinalysis      CBC Auto Differential      Treponema  Pallidum (Syphillis) Antibody      2.  screening encounter  Z36.9 V28.9 POCT Urine Drug Screen Presump      CBC Auto Differential      Treponema Pallidum (Syphillis) Antibody      Chlamydia/GC, PCR      Bacterial Vaginosis      Strep B Screen, Antepartum      POCT Urinalysis      CBC Auto Differential      Treponema Pallidum (Syphillis) Antibody      3. Screen for STD (sexually transmitted disease)  Z11.3 V74.5 Treponema Pallidum (Syphillis) Antibody      Chlamydia/GC, PCR      Bacterial Vaginosis      Treponema Pallidum (Syphillis) Antibody      4. High risk sexual behavior, unspecified type  Z72.51 V69.2 Treponema Pallidum (Syphillis) Antibody      Chlamydia/GC, PCR      Bacterial Vaginosis      Treponema Pallidum (Syphillis) Antibody      5. Encounter for other specified  screening  Z36.89 V28.9 POCT Urine Drug Screen Presump      6. Encounter for  screening for Streptococcus B  Z36.85 V28.6 Strep B Screen, Antepartum      7. History of  delivery, currently pregnant  O34.219 654.20 Case Request Operating Room:  SECTION      Admit to Inpatient      Full code      Vital signs      Verify informed consent      Fetal monitoring WITH contraction monitoring      [place] to Royal      Insert peripheral IV      Chlorhexidine (CHG) 2% Wipes      Clip and Prep Suprapubic      Notify NICU to attend birth      Notify Physician      Diet NPO      Place JOE hose      Place sequential compression device      miSOPROStoL tablet 800 mcg      Chlorohexidine Gluconate Bath      CBC auto differential      HIV 1/2 Ag/Ab (4th Gen)      POCT Cord Blood Gas Umbilical Artery Cord Gas      POCT Cord Blood Gas Umbilical Vein Cord Gas      Type & Screen      Inpatient consult to Anesthesiology            Jim England M.D., FCOG    OB/GYN

## 2023-10-10 LAB
CHLAMYDIA BY PCR: NEGATIVE
GROUP B STREP, PCR: NEGATIVE
N. GONORRHOEAE (GC) BY PCR: NEGATIVE

## 2023-10-16 ENCOUNTER — ROUTINE PRENATAL (OUTPATIENT)
Dept: OBSTETRICS AND GYNECOLOGY | Facility: CLINIC | Age: 28
End: 2023-10-16
Payer: MEDICAID

## 2023-10-16 VITALS
DIASTOLIC BLOOD PRESSURE: 80 MMHG | BODY MASS INDEX: 38.78 KG/M2 | WEIGHT: 247.63 LBS | SYSTOLIC BLOOD PRESSURE: 110 MMHG | HEART RATE: 97 BPM

## 2023-10-16 DIAGNOSIS — O34.219 HISTORY OF CESAREAN DELIVERY, CURRENTLY PREGNANT: ICD-10-CM

## 2023-10-16 DIAGNOSIS — Z3A.36 36 WEEKS GESTATION OF PREGNANCY: ICD-10-CM

## 2023-10-16 DIAGNOSIS — Z36.9 ANTENATAL SCREENING ENCOUNTER: Primary | ICD-10-CM

## 2023-10-16 LAB
BILIRUB SERPL-MCNC: NORMAL MG/DL
BLOOD, POC UA: NORMAL
GLUCOSE UR QL STRIP: NORMAL
KETONES UR QL STRIP: NORMAL
LEUKOCYTE ESTERASE URINE, POC: NORMAL
NITRITE, POC UA: NORMAL
PH, POC UA: 7
PROTEIN, POC: NORMAL
SPECIFIC GRAVITY, POC UA: 1.01
UROBILINOGEN, POC UA: 0.2

## 2023-10-16 PROCEDURE — 59426 PR ANTEPARTUM CARE ONLY, >7 VISITS: ICD-10-PCS | Mod: TH,,, | Performed by: OBSTETRICS & GYNECOLOGY

## 2023-10-16 PROCEDURE — 59426 ANTEPARTUM CARE ONLY: CPT | Mod: TH,,, | Performed by: OBSTETRICS & GYNECOLOGY

## 2023-10-16 NOTE — PROGRESS NOTES
28 y.o. female  at 36w2d   Reports fetal movement or fluttering. Denies any vaginal bleeding, leakage of fluid, cramping, contractions, or pressure.   She complains of no problems.  Pt states she is doing well without any concerns.     Vitals  BP: 110/80  Pulse: 97  Weight: 112.3 kg (247 lb 9.6 oz)  Prenatal  Fundal Height (cm): 36 cm  Fetal Heart Rate: 140s  Movement: Present  Urine Albumin/Glucose  Urine Albumin: Negative  Urine Glucose: Negative  Edema  LLE Edema: None  RLE Edema: None  Facial: None  Additional Edema?: No  Cervical Exam  Dilation: 1  Effacement (%): 60  Station: -3  Presentation: Vertex  Station (Labor Curve): 8 cm  Dilation/Effacement/Station  Dilation: 1  Effacement (%): 60  Station: -3    Prenatal Labs:  Lab Results   Component Value Date    GROUPTRH O POS 2023    HGB 10.4 (L) 10/09/2023    HCT 32.9 (L) 10/09/2023     10/09/2023    SICKLE Negative 2023    HEPBSAG Non-Reactive 2023    [id number] Non-Reactive 2023    LABNGO Negative 10/09/2023    LABURIN >100,000 Klebsiella pneumoniae (A) 2023    [id number] Negative 2020       No pregnancy checklist tasks were completed during this visit, and no tasks are pending completion.      Daily fetal kick counts, bleeding, and  labor/labor precautions discussed.  Questions answered to desired level of satisfaction  Verbalized understanding to all information and instructions provided.    Total weight gain/weight loss in pregnancy: Not found.     Follow up in about 1 week (around 10/23/2023) for LEISA.    A: 36w2d     ICD-10-CM ICD-9-CM    1.  screening encounter  Z36.9 V28.9 POCT Urinalysis      2. 36 weeks gestation of pregnancy  Z3A.36 V22.2 POCT Urinalysis      3. History of  delivery, currently pregnant  O34.219 654.20             Jim England M.D., FCOG    OB/GYN

## 2023-10-23 ENCOUNTER — ROUTINE PRENATAL (OUTPATIENT)
Dept: OBSTETRICS AND GYNECOLOGY | Facility: CLINIC | Age: 28
End: 2023-10-23
Payer: MEDICAID

## 2023-10-23 VITALS
SYSTOLIC BLOOD PRESSURE: 118 MMHG | WEIGHT: 253.19 LBS | DIASTOLIC BLOOD PRESSURE: 86 MMHG | HEART RATE: 108 BPM | BODY MASS INDEX: 39.66 KG/M2

## 2023-10-23 DIAGNOSIS — Z3A.37 37 WEEKS GESTATION OF PREGNANCY: ICD-10-CM

## 2023-10-23 DIAGNOSIS — Z36.9 ANTENATAL SCREENING ENCOUNTER: Primary | ICD-10-CM

## 2023-10-23 DIAGNOSIS — O34.219 HISTORY OF CESAREAN DELIVERY, CURRENTLY PREGNANT: ICD-10-CM

## 2023-10-23 LAB
BILIRUB SERPL-MCNC: NORMAL MG/DL
BLOOD, POC UA: NORMAL
GLUCOSE UR QL STRIP: NORMAL
KETONES UR QL STRIP: NORMAL
LEUKOCYTE ESTERASE URINE, POC: NORMAL
NITRITE, POC UA: NORMAL
PH, POC UA: 6.5
PROTEIN, POC: NORMAL
SPECIFIC GRAVITY, POC UA: 1.01
UROBILINOGEN, POC UA: 0.2

## 2023-10-23 PROCEDURE — 59426 PR ANTEPARTUM CARE ONLY, >7 VISITS: ICD-10-PCS | Mod: TH,,, | Performed by: OBSTETRICS & GYNECOLOGY

## 2023-10-23 PROCEDURE — 59426 ANTEPARTUM CARE ONLY: CPT | Mod: TH,,, | Performed by: OBSTETRICS & GYNECOLOGY

## 2023-10-23 NOTE — PROGRESS NOTES
28 y.o. female  at 37w2d   Reports fetal movement or fluttering. Denies any vaginal bleeding, leakage of fluid, cramping, contractions, or pressure.   She complains of no problems.  Pt states she is doing well without any concerns.     Vitals  BP: 118/86  Pulse: 108  Weight: 114.9 kg (253 lb 3.2 oz)  Prenatal  Fundal Height (cm): 38 cm  Fetal Heart Rate: 160s  Movement: Present  Urine Albumin/Glucose  Urine Albumin: Negative  Urine Glucose: Negative  Edema  LLE Edema: None  RLE Edema: None  Facial: None  Additional Edema?: No  Cervical Exam  Dilation: 1  Effacement (%): 60  Station: -3  Presentation: Vertex  Station (Labor Curve): 8 cm  Dilation/Effacement/Station  Dilation: 1  Effacement (%): 60  Station: -3    Prenatal Labs:  Lab Results   Component Value Date    GROUPTRH O POS 2023    HGB 10.4 (L) 10/09/2023    HCT 32.9 (L) 10/09/2023     10/09/2023    SICKLE Negative 2023    HEPBSAG Non-Reactive 2023    [id number] Non-Reactive 2023    LABNGO Negative 10/09/2023    LABURIN >100,000 Klebsiella pneumoniae (A) 2023    [id number] Negative 2020       The following were addressed during this visit:    37-41 Weeks  - Postpartum Immunizations   - Hospital Stay Expectations   - When to go to the hospital   - Fetal kick counts/PIH/Bleeding/ROM/Labor precautions   - Labor induction policy   - Cervical ripening   - Breastfeeding review: benefits of breastfeeding, early skin to skin, 24/7 rooming in, exclusive breastfeeding for 6 months       Daily fetal kick counts, bleeding, and  labor/labor precautions discussed.  Questions answered to desired level of satisfaction  Verbalized understanding to all information and instructions provided.    Total weight gain/weight loss in pregnancy: Not found.     Follow up in about 1 week (around 10/30/2023) for LEISA.    A: 37w2d     ICD-10-CM ICD-9-CM    1.  screening encounter  Z36.9 V28.9 POCT Urinalysis      2. 37 weeks  gestation of pregnancy  Z3A.37 V22.2 POCT Urinalysis      3. History of  delivery, currently pregnant  O34.219 654.20             Jim England M.D., OG    OB/GYN

## 2023-10-26 ENCOUNTER — TELEPHONE (OUTPATIENT)
Dept: OBSTETRICS AND GYNECOLOGY | Facility: CLINIC | Age: 28
End: 2023-10-26
Payer: MEDICAID

## 2023-10-26 NOTE — TELEPHONE ENCOUNTER
----- Message from Jewels Campos MA sent at 10/24/2023  9:49 AM CDT -----  HAS FMLA BEEN DONE, HER JOB IS CALLING CALL [phone]

## 2023-10-30 ENCOUNTER — ROUTINE PRENATAL (OUTPATIENT)
Dept: OBSTETRICS AND GYNECOLOGY | Facility: CLINIC | Age: 28
End: 2023-10-30
Payer: MEDICAID

## 2023-10-30 VITALS
DIASTOLIC BLOOD PRESSURE: 74 MMHG | WEIGHT: 250.19 LBS | SYSTOLIC BLOOD PRESSURE: 126 MMHG | BODY MASS INDEX: 39.19 KG/M2 | HEART RATE: 107 BPM

## 2023-10-30 DIAGNOSIS — Z3A.38 38 WEEKS GESTATION OF PREGNANCY: ICD-10-CM

## 2023-10-30 DIAGNOSIS — O34.219 HISTORY OF CESAREAN DELIVERY, CURRENTLY PREGNANT: ICD-10-CM

## 2023-10-30 DIAGNOSIS — Z36.9 ANTENATAL SCREENING ENCOUNTER: Primary | ICD-10-CM

## 2023-10-30 LAB
BILIRUB SERPL-MCNC: NORMAL MG/DL
BLOOD, POC UA: NORMAL
CTP QC/QA: YES
GLUCOSE UR QL STRIP: NORMAL
KETONES UR QL STRIP: NORMAL
LEUKOCYTE ESTERASE URINE, POC: NORMAL
NITRITE, POC UA: NORMAL
PH, POC UA: 6.5
POC (AMP) AMPHETAMINE: NEGATIVE
POC (BAR) BARBITURATES: NEGATIVE
POC (BUP) BUPRENORPHINE: NEGATIVE
POC (BZO) BENZODIAZEPINES: NEGATIVE
POC (COC) COCAINE: NEGATIVE
POC (MDMA) METHYLENEDIOXYMETHAMPHETAMINE 3,4: NEGATIVE
POC (MET) METHAMPHETAMINE: NEGATIVE
POC (MOP) OPIATES: NEGATIVE
POC (MTD) METHADONE: NEGATIVE
POC (OXY) OXYCODONE: NEGATIVE
POC (PCP) PHENCYCLIDINE: NEGATIVE
POC (TCA) TRICYCLIC ANTIDEPRESSANTS: NEGATIVE
POC TEMPERATURE (URINE): 94
POC THC: NEGATIVE
PROTEIN, POC: NORMAL
SPECIFIC GRAVITY, POC UA: 1.02
UROBILINOGEN, POC UA: 1

## 2023-10-30 PROCEDURE — 59426 PR ANTEPARTUM CARE ONLY, >7 VISITS: ICD-10-PCS | Mod: TH,,, | Performed by: OBSTETRICS & GYNECOLOGY

## 2023-10-30 PROCEDURE — 59426 ANTEPARTUM CARE ONLY: CPT | Mod: TH,,, | Performed by: OBSTETRICS & GYNECOLOGY

## 2023-10-30 NOTE — PROGRESS NOTES
28 y.o. female  at 38w2d   Reports fetal movement or fluttering. Denies any vaginal bleeding, leakage of fluid, cramping, contractions, or pressure.   She complains of no problems.  Pt states she is doing well without any concerns.     Vitals  BP: 126/74  Pulse: 107  Weight: 113.5 kg (250 lb 3.2 oz)  Prenatal  Fundal Height (cm): 38 cm  Fetal Heart Rate: 140s  Movement: Present  Urine Albumin/Glucose  Urine Albumin: Trace  Urine Glucose: Negative  Edema  LLE Edema: None  RLE Edema: None  Facial: None  Additional Edema?: No  Cervical Exam  Dilation: 1  Effacement (%): 50  Station: -3  Presentation: Vertex  Station (Labor Curve): 8 cm  Dilation/Effacement/Station  Dilation: 1  Effacement (%): 50  Station: -3    Prenatal Labs:  Lab Results   Component Value Date    GROUPTRH O POS 2023    HGB 10.4 (L) 10/09/2023    HCT 32.9 (L) 10/09/2023     10/09/2023    SICKLE Negative 2023    HEPBSAG Non-Reactive 2023    [id number] Non-Reactive 2023    LABNGO Negative 10/09/2023    LABURIN >100,000 Klebsiella pneumoniae (A) 2023    [id number] Negative 2020       The following were addressed during this visit:  - Importance of Breastfeeding       Daily fetal kick counts, bleeding, and  labor/labor precautions discussed.  Questions answered to desired level of satisfaction  Verbalized understanding to all information and instructions provided.    Total weight gain/weight loss in pregnancy: Not found.     Follow up if symptoms worsen or fail to improve, for PostOp.    A: 38w2d     ICD-10-CM ICD-9-CM    1.  screening encounter  Z36.9 V28.9 POCT Urinalysis      2. 38 weeks gestation of pregnancy  Z3A.38 V22.2 POCT Urinalysis      3. History of  delivery, currently pregnant  O34.219 654.20             Jim England M.D., FCOG    OB/GYN

## 2023-11-02 ENCOUNTER — ANESTHESIA (OUTPATIENT)
Dept: OBSTETRICS AND GYNECOLOGY | Facility: HOSPITAL | Age: 28
End: 2023-11-02
Payer: MEDICAID

## 2023-11-02 ENCOUNTER — ANESTHESIA EVENT (OUTPATIENT)
Dept: OBSTETRICS AND GYNECOLOGY | Facility: HOSPITAL | Age: 28
End: 2023-11-02
Payer: MEDICAID

## 2023-11-02 ENCOUNTER — HOSPITAL ENCOUNTER (INPATIENT)
Facility: HOSPITAL | Age: 28
LOS: 2 days | Discharge: HOME | End: 2023-11-04
Attending: OBSTETRICS & GYNECOLOGY | Admitting: OBSTETRICS & GYNECOLOGY
Payer: MEDICAID

## 2023-11-02 DIAGNOSIS — O34.219 PREVIOUS CESAREAN SECTION COMPLICATING PREGNANCY: ICD-10-CM

## 2023-11-02 DIAGNOSIS — Z3A.38 38 WEEKS GESTATION OF PREGNANCY: ICD-10-CM

## 2023-11-02 DIAGNOSIS — Z3A.35 35 WEEKS GESTATION OF PREGNANCY: ICD-10-CM

## 2023-11-02 DIAGNOSIS — O34.219 HISTORY OF CESAREAN DELIVERY, CURRENTLY PREGNANT: ICD-10-CM

## 2023-11-02 DIAGNOSIS — Z98.891 STATUS POST CESAREAN DELIVERY: ICD-10-CM

## 2023-11-02 LAB
ANISOCYTOSIS BLD QL SMEAR: ABNORMAL
BASOPHILS # BLD AUTO: 0.04 K/UL (ref 0–0.2)
BASOPHILS NFR BLD AUTO: 0.3 % (ref 0–1)
BILIRUB UR QL STRIP: NEGATIVE
CLARITY UR: ABNORMAL
COLOR UR: YELLOW
DIFFERENTIAL METHOD BLD: ABNORMAL
EOSINOPHIL # BLD AUTO: 0.11 K/UL (ref 0–0.5)
EOSINOPHIL NFR BLD AUTO: 0.9 % (ref 1–4)
ERYTHROCYTE [DISTWIDTH] IN BLOOD BY AUTOMATED COUNT: 17.2 % (ref 11.5–14.5)
GLUCOSE UR STRIP-MCNC: NORMAL MG/DL
HCT VFR BLD AUTO: 32.3 % (ref 38–47)
HGB BLD-MCNC: 10.5 G/DL (ref 12–16)
HIV 1+O+2 AB SERPL QL: NORMAL
HYPOCHROMIA BLD QL SMEAR: ABNORMAL
IMM GRANULOCYTES # BLD AUTO: 0.1 K/UL (ref 0–0.04)
IMM GRANULOCYTES NFR BLD: 0.8 % (ref 0–0.4)
INDIRECT COOMBS: NORMAL
KETONES UR STRIP-SCNC: NEGATIVE MG/DL
LEUKOCYTE ESTERASE UR QL STRIP: ABNORMAL
LYMPHOCYTES # BLD AUTO: 3.12 K/UL (ref 1–4.8)
LYMPHOCYTES NFR BLD AUTO: 25.1 % (ref 27–41)
MCH RBC QN AUTO: 23.6 PG (ref 27–31)
MCHC RBC AUTO-ENTMCNC: 32.5 G/DL (ref 32–36)
MCV RBC AUTO: 72.7 FL (ref 80–96)
MICROCYTES BLD QL SMEAR: ABNORMAL
MONOCYTES # BLD AUTO: 0.77 K/UL (ref 0–0.8)
MONOCYTES NFR BLD AUTO: 6.2 % (ref 2–6)
MPC BLD CALC-MCNC: 10.8 FL (ref 9.4–12.4)
MUCOUS, UA: ABNORMAL /LPF
NEUTROPHILS # BLD AUTO: 8.27 K/UL (ref 1.8–7.7)
NEUTROPHILS NFR BLD AUTO: 66.7 % (ref 53–65)
NITRITE UR QL STRIP: NEGATIVE
NRBC # BLD AUTO: 0 X10E3/UL
NRBC, AUTO (.00): 0 %
PH UR STRIP: 6.5 PH UNITS
PLATELET # BLD AUTO: 252 K/UL (ref 150–400)
PLATELET MORPHOLOGY: ABNORMAL
PROT UR QL STRIP: 30
RBC # BLD AUTO: 4.44 M/UL (ref 4.2–5.4)
RBC # UR STRIP: ABNORMAL /UL
RBC #/AREA URNS HPF: 78 /HPF
RH BLD: NORMAL
SP GR UR STRIP: 1.01
SPECIMEN OUTDATE: NORMAL
SQUAMOUS #/AREA URNS LPF: ABNORMAL /HPF
UROBILINOGEN UR STRIP-ACNC: NORMAL MG/DL
WBC # BLD AUTO: 12.41 K/UL (ref 4.5–11)
WBC #/AREA URNS HPF: 8 /HPF

## 2023-11-02 PROCEDURE — 59515 CESAREAN DELIVERY: CPT | Mod: QY,ANES,, | Performed by: ANESTHESIOLOGY

## 2023-11-02 PROCEDURE — 59515 PRA REAN DELIVERY+POSTPARTUM CARE: ICD-10-PCS | Mod: CRNA,,, | Performed by: NURSE ANESTHETIST, CERTIFIED REGISTERED

## 2023-11-02 PROCEDURE — 99285 EMERGENCY DEPT VISIT HI MDM: CPT

## 2023-11-02 PROCEDURE — 71000033 HC RECOVERY, INTIAL HOUR: Performed by: OBSTETRICS & GYNECOLOGY

## 2023-11-02 PROCEDURE — 63600175 PHARM REV CODE 636 W HCPCS: Performed by: OBSTETRICS & GYNECOLOGY

## 2023-11-02 PROCEDURE — 59515 PR CESAREAN DELIVERY+POSTPARTUM CARE: ICD-10-PCS | Mod: TH,,, | Performed by: OBSTETRICS & GYNECOLOGY

## 2023-11-02 PROCEDURE — 36004725 HC OB OR TIME LEV III - EA ADD 15 MIN: Performed by: OBSTETRICS & GYNECOLOGY

## 2023-11-02 PROCEDURE — 37000009 HC ANESTHESIA EA ADD 15 MINS: Performed by: OBSTETRICS & GYNECOLOGY

## 2023-11-02 PROCEDURE — 63600175 PHARM REV CODE 636 W HCPCS: Performed by: ANESTHESIOLOGY

## 2023-11-02 PROCEDURE — 51702 INSERT TEMP BLADDER CATH: CPT

## 2023-11-02 PROCEDURE — 87086 URINE CULTURE/COLONY COUNT: CPT | Performed by: OBSTETRICS & GYNECOLOGY

## 2023-11-02 PROCEDURE — 36004724 HC OB OR TIME LEV III - 1ST 15 MIN: Performed by: OBSTETRICS & GYNECOLOGY

## 2023-11-02 PROCEDURE — 86780 TREPONEMA PALLIDUM: CPT | Performed by: OBSTETRICS & GYNECOLOGY

## 2023-11-02 PROCEDURE — 11000001 HC ACUTE MED/SURG PRIVATE ROOM

## 2023-11-02 PROCEDURE — 87389 HIV-1 AG W/HIV-1&-2 AB AG IA: CPT | Performed by: OBSTETRICS & GYNECOLOGY

## 2023-11-02 PROCEDURE — 25000003 PHARM REV CODE 250: Performed by: OBSTETRICS & GYNECOLOGY

## 2023-11-02 PROCEDURE — 59515 CESAREAN DELIVERY: CPT | Mod: CRNA,,, | Performed by: NURSE ANESTHETIST, CERTIFIED REGISTERED

## 2023-11-02 PROCEDURE — 59515 PRA REAN DELIVERY+POSTPARTUM CARE: ICD-10-PCS | Mod: QY,ANES,, | Performed by: ANESTHESIOLOGY

## 2023-11-02 PROCEDURE — 85025 COMPLETE CBC W/AUTO DIFF WBC: CPT | Performed by: OBSTETRICS & GYNECOLOGY

## 2023-11-02 PROCEDURE — 59515 CESAREAN DELIVERY: CPT | Mod: TH,,, | Performed by: OBSTETRICS & GYNECOLOGY

## 2023-11-02 PROCEDURE — 27201423 OPTIME MED/SURG SUP & DEVICES STERILE SUPPLY: Performed by: OBSTETRICS & GYNECOLOGY

## 2023-11-02 PROCEDURE — 71000039 HC RECOVERY, EACH ADD'L HOUR: Performed by: OBSTETRICS & GYNECOLOGY

## 2023-11-02 PROCEDURE — 37000008 HC ANESTHESIA 1ST 15 MINUTES: Performed by: OBSTETRICS & GYNECOLOGY

## 2023-11-02 PROCEDURE — 81001 URINALYSIS AUTO W/SCOPE: CPT | Performed by: OBSTETRICS & GYNECOLOGY

## 2023-11-02 PROCEDURE — 86850 RBC ANTIBODY SCREEN: CPT | Performed by: OBSTETRICS & GYNECOLOGY

## 2023-11-02 RX ORDER — OXYTOCIN/RINGER'S LACTATE 30/500 ML
95 PLASTIC BAG, INJECTION (ML) INTRAVENOUS ONCE
Status: COMPLETED | OUTPATIENT
Start: 2023-11-02 | End: 2023-11-02

## 2023-11-02 RX ORDER — PHENYLEPHRINE HYDROCHLORIDE 10 MG/ML
INJECTION INTRAVENOUS
Status: DISCONTINUED | OUTPATIENT
Start: 2023-11-02 | End: 2023-11-02

## 2023-11-02 RX ORDER — CARBOPROST TROMETHAMINE 250 UG/ML
250 INJECTION, SOLUTION INTRAMUSCULAR
Status: DISCONTINUED | OUTPATIENT
Start: 2023-11-02 | End: 2023-11-02

## 2023-11-02 RX ORDER — PRENATAL WITH FERROUS FUM AND FOLIC ACID 3080; 920; 120; 400; 22; 1.84; 3; 20; 10; 1; 12; 200; 27; 25; 2 [IU]/1; [IU]/1; MG/1; [IU]/1; MG/1; MG/1; MG/1; MG/1; MG/1; MG/1; UG/1; MG/1; MG/1; MG/1; MG/1
1 TABLET ORAL DAILY
Status: DISCONTINUED | OUTPATIENT
Start: 2023-11-02 | End: 2023-11-04 | Stop reason: HOSPADM

## 2023-11-02 RX ORDER — OXYTOCIN 10 [USP'U]/ML
10 INJECTION, SOLUTION INTRAMUSCULAR; INTRAVENOUS ONCE AS NEEDED
Status: DISCONTINUED | OUTPATIENT
Start: 2023-11-02 | End: 2023-11-04 | Stop reason: HOSPADM

## 2023-11-02 RX ORDER — SODIUM CHLORIDE 0.9 % (FLUSH) 0.9 %
10 SYRINGE (ML) INJECTION
Status: DISCONTINUED | OUTPATIENT
Start: 2023-11-02 | End: 2023-11-04 | Stop reason: HOSPADM

## 2023-11-02 RX ORDER — MISOPROSTOL 200 UG/1
800 TABLET ORAL
Status: DISCONTINUED | OUTPATIENT
Start: 2023-11-02 | End: 2023-11-02

## 2023-11-02 RX ORDER — PROCHLORPERAZINE EDISYLATE 5 MG/ML
5 INJECTION INTRAMUSCULAR; INTRAVENOUS EVERY 6 HOURS PRN
Status: DISCONTINUED | OUTPATIENT
Start: 2023-11-02 | End: 2023-11-04 | Stop reason: HOSPADM

## 2023-11-02 RX ORDER — HYDROCODONE BITARTRATE AND ACETAMINOPHEN 7.5; 325 MG/1; MG/1
1 TABLET ORAL EVERY 4 HOURS PRN
Status: DISCONTINUED | OUTPATIENT
Start: 2023-11-02 | End: 2023-11-04 | Stop reason: HOSPADM

## 2023-11-02 RX ORDER — ACETAMINOPHEN 325 MG/1
650 TABLET ORAL EVERY 6 HOURS
Status: DISPENSED | OUTPATIENT
Start: 2023-11-02 | End: 2023-11-03

## 2023-11-02 RX ORDER — MUPIROCIN 20 MG/G
OINTMENT TOPICAL 2 TIMES DAILY
Status: CANCELLED | OUTPATIENT
Start: 2023-11-02 | End: 2023-11-07

## 2023-11-02 RX ORDER — OXYTOCIN/RINGER'S LACTATE 30/500 ML
95 PLASTIC BAG, INJECTION (ML) INTRAVENOUS ONCE AS NEEDED
Status: DISCONTINUED | OUTPATIENT
Start: 2023-11-02 | End: 2023-11-02

## 2023-11-02 RX ORDER — SODIUM CHLORIDE, SODIUM LACTATE, POTASSIUM CHLORIDE, CALCIUM CHLORIDE 600; 310; 30; 20 MG/100ML; MG/100ML; MG/100ML; MG/100ML
INJECTION, SOLUTION INTRAVENOUS CONTINUOUS
Status: DISCONTINUED | OUTPATIENT
Start: 2023-11-02 | End: 2023-11-04 | Stop reason: HOSPADM

## 2023-11-02 RX ORDER — CARBOPROST TROMETHAMINE 250 UG/ML
250 INJECTION, SOLUTION INTRAMUSCULAR
Status: DISCONTINUED | OUTPATIENT
Start: 2023-11-02 | End: 2023-11-04 | Stop reason: HOSPADM

## 2023-11-02 RX ORDER — MORPHINE SULFATE 1 MG/ML
INJECTION, SOLUTION EPIDURAL; INTRATHECAL; INTRAVENOUS
Status: DISCONTINUED | OUTPATIENT
Start: 2023-11-02 | End: 2023-11-02

## 2023-11-02 RX ORDER — OXYTOCIN 10 [USP'U]/ML
INJECTION, SOLUTION INTRAMUSCULAR; INTRAVENOUS
Status: DISCONTINUED | OUTPATIENT
Start: 2023-11-02 | End: 2023-11-02

## 2023-11-02 RX ORDER — ADHESIVE BANDAGE
30 BANDAGE TOPICAL 2 TIMES DAILY PRN
Status: DISCONTINUED | OUTPATIENT
Start: 2023-11-03 | End: 2023-11-04 | Stop reason: HOSPADM

## 2023-11-02 RX ORDER — AMOXICILLIN 250 MG
1 CAPSULE ORAL NIGHTLY PRN
Status: DISCONTINUED | OUTPATIENT
Start: 2023-11-02 | End: 2023-11-04 | Stop reason: HOSPADM

## 2023-11-02 RX ORDER — MORPHINE SULFATE 4 MG/ML
4 INJECTION, SOLUTION INTRAMUSCULAR; INTRAVENOUS
Status: DISCONTINUED | OUTPATIENT
Start: 2023-11-02 | End: 2023-11-04 | Stop reason: HOSPADM

## 2023-11-02 RX ORDER — SIMETHICONE 80 MG
1 TABLET,CHEWABLE ORAL EVERY 6 HOURS PRN
Status: DISCONTINUED | OUTPATIENT
Start: 2023-11-02 | End: 2023-11-04 | Stop reason: HOSPADM

## 2023-11-02 RX ORDER — DIPHENHYDRAMINE HCL 25 MG
25 CAPSULE ORAL EVERY 4 HOURS PRN
Status: DISCONTINUED | OUTPATIENT
Start: 2023-11-02 | End: 2023-11-04 | Stop reason: HOSPADM

## 2023-11-02 RX ORDER — HYDROCORTISONE 25 MG/G
CREAM TOPICAL 3 TIMES DAILY PRN
Status: DISCONTINUED | OUTPATIENT
Start: 2023-11-02 | End: 2023-11-04 | Stop reason: HOSPADM

## 2023-11-02 RX ORDER — ONDANSETRON 2 MG/ML
4 INJECTION INTRAMUSCULAR; INTRAVENOUS EVERY 6 HOURS PRN
Status: DISPENSED | OUTPATIENT
Start: 2023-11-02 | End: 2023-11-03

## 2023-11-02 RX ORDER — OXYTOCIN/RINGER'S LACTATE 30/500 ML
334 PLASTIC BAG, INJECTION (ML) INTRAVENOUS ONCE AS NEEDED
Status: DISCONTINUED | OUTPATIENT
Start: 2023-11-02 | End: 2023-11-02

## 2023-11-02 RX ORDER — MISOPROSTOL 200 UG/1
800 TABLET ORAL ONCE AS NEEDED
Status: DISCONTINUED | OUTPATIENT
Start: 2023-11-02 | End: 2023-11-04 | Stop reason: HOSPADM

## 2023-11-02 RX ORDER — OXYTOCIN/RINGER'S LACTATE 30/500 ML
334 PLASTIC BAG, INJECTION (ML) INTRAVENOUS ONCE
Status: DISCONTINUED | OUTPATIENT
Start: 2023-11-02 | End: 2023-11-02

## 2023-11-02 RX ORDER — DIPHENOXYLATE HYDROCHLORIDE AND ATROPINE SULFATE 2.5; .025 MG/1; MG/1
2 TABLET ORAL EVERY 6 HOURS PRN
Status: DISCONTINUED | OUTPATIENT
Start: 2023-11-02 | End: 2023-11-04 | Stop reason: HOSPADM

## 2023-11-02 RX ORDER — FAMOTIDINE 10 MG/ML
20 INJECTION INTRAVENOUS
Status: DISCONTINUED | OUTPATIENT
Start: 2023-11-02 | End: 2023-11-02

## 2023-11-02 RX ORDER — OXYTOCIN/RINGER'S LACTATE 30/500 ML
95 PLASTIC BAG, INJECTION (ML) INTRAVENOUS ONCE
Status: DISCONTINUED | OUTPATIENT
Start: 2023-11-02 | End: 2023-11-02

## 2023-11-02 RX ORDER — METHYLERGONOVINE MALEATE 0.2 MG/ML
200 INJECTION INTRAVENOUS
Status: DISCONTINUED | OUTPATIENT
Start: 2023-11-02 | End: 2023-11-02

## 2023-11-02 RX ORDER — METHYLERGONOVINE MALEATE 0.2 MG/ML
200 INJECTION INTRAVENOUS ONCE AS NEEDED
Status: DISCONTINUED | OUTPATIENT
Start: 2023-11-02 | End: 2023-11-04 | Stop reason: HOSPADM

## 2023-11-02 RX ORDER — TRANEXAMIC ACID 10 MG/ML
1000 INJECTION, SOLUTION INTRAVENOUS EVERY 30 MIN PRN
Status: DISCONTINUED | OUTPATIENT
Start: 2023-11-02 | End: 2023-11-04 | Stop reason: HOSPADM

## 2023-11-02 RX ORDER — DOCUSATE SODIUM 100 MG/1
200 CAPSULE, LIQUID FILLED ORAL 2 TIMES DAILY
Status: DISCONTINUED | OUTPATIENT
Start: 2023-11-02 | End: 2023-11-04 | Stop reason: HOSPADM

## 2023-11-02 RX ORDER — SODIUM CITRATE AND CITRIC ACID MONOHYDRATE 334; 500 MG/5ML; MG/5ML
30 SOLUTION ORAL
Status: DISCONTINUED | OUTPATIENT
Start: 2023-11-02 | End: 2023-11-02

## 2023-11-02 RX ORDER — IBUPROFEN 800 MG/1
800 TABLET ORAL EVERY 8 HOURS PRN
Status: DISCONTINUED | OUTPATIENT
Start: 2023-11-02 | End: 2023-11-04 | Stop reason: HOSPADM

## 2023-11-02 RX ORDER — MUPIROCIN 20 MG/G
OINTMENT TOPICAL
Status: DISCONTINUED | OUTPATIENT
Start: 2023-11-02 | End: 2023-11-02

## 2023-11-02 RX ORDER — OXYCODONE HYDROCHLORIDE 5 MG/1
5 TABLET ORAL EVERY 4 HOURS PRN
Status: ACTIVE | OUTPATIENT
Start: 2023-11-02 | End: 2023-11-03

## 2023-11-02 RX ORDER — KETOROLAC TROMETHAMINE 30 MG/ML
30 INJECTION, SOLUTION INTRAMUSCULAR; INTRAVENOUS EVERY 6 HOURS
Status: COMPLETED | OUTPATIENT
Start: 2023-11-02 | End: 2023-11-03

## 2023-11-02 RX ORDER — ONDANSETRON 4 MG/1
8 TABLET, ORALLY DISINTEGRATING ORAL EVERY 8 HOURS PRN
Status: DISCONTINUED | OUTPATIENT
Start: 2023-11-02 | End: 2023-11-04 | Stop reason: HOSPADM

## 2023-11-02 RX ORDER — BISACODYL 10 MG
10 SUPPOSITORY, RECTAL RECTAL ONCE AS NEEDED
Status: DISCONTINUED | OUTPATIENT
Start: 2023-11-02 | End: 2023-11-04 | Stop reason: HOSPADM

## 2023-11-02 RX ORDER — OXYCODONE HYDROCHLORIDE 5 MG/1
10 TABLET ORAL EVERY 4 HOURS PRN
Status: ACTIVE | OUTPATIENT
Start: 2023-11-02 | End: 2023-11-03

## 2023-11-02 RX ADMIN — PROCHLORPERAZINE EDISYLATE 5 MG: 5 INJECTION INTRAMUSCULAR; INTRAVENOUS at 05:11

## 2023-11-02 RX ADMIN — SODIUM CHLORIDE, POTASSIUM CHLORIDE, SODIUM LACTATE AND CALCIUM CHLORIDE: 600; 310; 30; 20 INJECTION, SOLUTION INTRAVENOUS at 07:11

## 2023-11-02 RX ADMIN — OXYTOCIN 30 UNITS: 10 INJECTION, SOLUTION INTRAMUSCULAR; INTRAVENOUS at 10:11

## 2023-11-02 RX ADMIN — ONDANSETRON 4 MG: 2 INJECTION INTRAMUSCULAR; INTRAVENOUS at 01:11

## 2023-11-02 RX ADMIN — MORPHINE SULFATE 0.25 MG: 1 INJECTION, SOLUTION EPIDURAL; INTRATHECAL; INTRAVENOUS at 10:11

## 2023-11-02 RX ADMIN — OXYTOCIN 95 MILLI-UNITS/MIN: 10 INJECTION, SOLUTION INTRAMUSCULAR; INTRAVENOUS at 11:11

## 2023-11-02 RX ADMIN — SODIUM CHLORIDE, POTASSIUM CHLORIDE, SODIUM LACTATE AND CALCIUM CHLORIDE: 600; 310; 30; 20 INJECTION, SOLUTION INTRAVENOUS at 04:11

## 2023-11-02 RX ADMIN — DOCUSATE SODIUM 200 MG: 100 CAPSULE, LIQUID FILLED ORAL at 09:11

## 2023-11-02 RX ADMIN — KETOROLAC TROMETHAMINE 30 MG: 30 INJECTION INTRAMUSCULAR; INTRAVENOUS at 04:11

## 2023-11-02 RX ADMIN — SODIUM CITRATE AND CITRIC ACID MONOHYDRATE 30 ML: 500; 334 SOLUTION ORAL at 07:11

## 2023-11-02 RX ADMIN — FAMOTIDINE 20 MG: 10 INJECTION, SOLUTION INTRAVENOUS at 07:11

## 2023-11-02 RX ADMIN — PHENYLEPHRINE HYDROCHLORIDE 100 MCG: 10 INJECTION INTRAVENOUS at 10:11

## 2023-11-02 RX ADMIN — KETOROLAC TROMETHAMINE 60 MG: 30 INJECTION INTRAMUSCULAR; INTRAVENOUS at 10:11

## 2023-11-02 RX ADMIN — CEFAZOLIN 2 G: 2 INJECTION, POWDER, FOR SOLUTION INTRAMUSCULAR; INTRAVENOUS at 10:11

## 2023-11-02 NOTE — ANESTHESIA POSTPROCEDURE EVALUATION
Anesthesia Post Evaluation    Patient: Nigel Camejo    Procedure(s) Performed: Procedure(s) (LRB):   SECTION (N/A)    Final Anesthesia Type: spinal      Patient location during evaluation: labor & delivery  Patient participation: Yes- Able to Participate  Level of consciousness: awake and alert  Post-procedure vital signs: reviewed and stable  Pain management: adequate  Airway patency: patent    PONV status at discharge: No PONV  Anesthetic complications: no      Cardiovascular status: blood pressure returned to baseline  Respiratory status: unassisted  Hydration status: euvolemic  Follow-up not needed.          Vitals Value Taken Time   /56 23 1327   Temp 36.4 °C (97.5 °F) 23 1133   Pulse 63 23 1331   Resp 16 23 1133   SpO2 100 % 23 1331   Vitals shown include unvalidated device data.      Event Time   Out of Recovery 13:20:00         Pain/Judi Score: No data recorded

## 2023-11-02 NOTE — ED PROVIDER NOTES
Encounter Date: 2023    [facility] Physician: ZITA LEWIS   Primary OBGYN:Dr. BeckettLund    Admit Diagnosis/Chief Complaint: Leakage of Fluid  History     Chief Complaint   Patient presents with    Leakage/loss Of Fluid     HPI  28-year-old  3 para , at 38 weeks 5 days' gestation, presents to the OB ED with complaints of leaking of amniotic fluid since 0045 tonight.  Patient reports occasional contractions.  No vaginal bleeding.  Baby has been active.  Patient's history is pertinent for 2 prior  sections and she is scheduled for repeat  section next week.  Patient sees Dr. Hair for prenatal care.  Patient denies any prenatal problems.  Group B strep screen is negative.    Medical history negative for chronic or serious illnesses.  Surgical history:   section x2  Allergies: None  Medications:  Prenatal vitamins  Social history:  Negative for tobacco, alcohol, illicit drug use  Review of patient's allergies indicates:  No Known Allergies  No past medical history on file.  Past Surgical History:   Procedure Laterality Date     SECTION      x2     Family History   Problem Relation Age of Onset    Bone cancer Maternal Grandmother     Cancer Maternal Grandmother     Pancreatic cancer Maternal Grandfather     Cancer Maternal Grandfather     Diabetes Father     Hypertension Mother     Heart failure Mother     Diabetes Maternal Aunt     Cancer Maternal Aunt     Kidney failure Maternal Aunt     Diabetes Maternal Aunt     Stroke Maternal Aunt     Diabetes Maternal Aunt      Social History     Tobacco Use    Smoking status: Former    Smokeless tobacco: Never   Substance Use Topics    Alcohol use: Not Currently    Drug use: Not Currently     Review of Systems  REVIEW OF SYSTEMS:  Constitutional:  No fever, no chills.  HEENT:  No visual disturbances.  Respiratory:  No shortness of breath, no cough.  Cardiovascular:  No chest pain.  Gastrointestinal:  No nausea, no  vomiting.  Genitourinary:  No dysuria, no hematuria.  Endocrine:  No hypoglycemia, no hyperglycemia.  Neurological:  No seizures, no headaches.  Immunologic:  Not immunocompromised.        Physical Exam     Initial Vitals [23 0245]   BP Pulse Resp Temp SpO2   119/71 80 20 99 °F (37.2 °C) 99 %      MAP       --         Physical Exam  PHYSICAL EXAM::  Afebrile, vital signs stable.  General: Alert and oriented. No acute distress. Resting comfortably.  HEENT:  Extraocular movements intact.  No scleral icterus.  Cardiovascular:  Heart rate is normal.   Pulmonary: Respirations are non-labored.  Symmetrical chest wall expansion.  Abdomen:  Soft and non-tender.  Gravid uterus noted.  Neurologic:  No focal neurological deficits.  Psychiatric:  Cooperative.  Appropriate mood and affect.  OB EXAM:  Uterus:  Soft, nontender.  [place]:  Contractions: mild, irregular 4-6 minutes   Fetal heart tones:  Normal FHR baseline with moderate variability, accelerations present, no decelerations.  Category 1 strip.  Cervix: grossly ruptured, clear fluid, with pooling in posterior fornix, Nitrazine Positive  Cervix 1 cm/50% effaced/high    ED Course   Labs Reviewed - No data to display    Patient presents with grossly ruptured membranes and irregular, mild contractions.  History is pertinent for 2 prior  sections.  Patient is admitted to labor and delivery.  I have discussed this patient's care with Dr. Hair and Dr. Hair plans to do repeat  section this morning.     Imaging Results    None          Medical Decision Making     Medications - No data to display      Patient presents with grossly ruptured membranes and irregular, mild contractions.  History is pertinent for 2 prior  sections.  Patient is admitted to labor and delivery.  I have discussed this patient's care with Dr. Hair and Dr. Hair plans to do repeat  section this morning.                     Clinical Impression:   Final  diagnoses:  [O42.02] Full-term premature rupture of membranes with onset of labor within 24 hours of rupture (Primary)  [O34.219] Previous  section complicating pregnancy  [Z3A.38] 38 weeks gestation of pregnancy   Group B strep negative     ED Disposition Condition    Admit            Admit for delivery  I have discussed this patient's case with Dr. Hair.  Plan is for repeat  section later this morning.     Darrius Bloom MD  23 0421

## 2023-11-02 NOTE — HPI
This is a 28-year-old  002 admitted at 38 weeks 5 day secondary to spontaneous rupture membrane.  Prenatal care with Dr. Hair.  Patient with history of previous  x2 with repeat scheduled  section for Monday.  She presented to labor and delivery with documented premature rupture membranes and therefore admitted for  section this morning.

## 2023-11-02 NOTE — SUBJECTIVE & OBJECTIVE
Obstetric HPI:  Patient reports Frequency: Every 5 minutes contractions, active fetal movement, No vaginal bleeding , Yes loss of fluid     This pregnancy has been complicated by previous  x 2    OB History    Para Term  AB Living   3 2 2 0 0 2   SAB IAB Ectopic Multiple Live Births   0 0 0 0 0      # Outcome Date GA Lbr Volodymyr/2nd Weight Sex Delivery Anes PTL Lv   3 Current            2 Term            1 Term              No past medical history on file.  Past Surgical History:   Procedure Laterality Date     SECTION      x2       PTA Medications   Medication Sig    docusate sodium (COLACE) 100 MG capsule Take 1 capsule (100 mg total) by mouth 2 (two) times daily.    ondansetron (ZOFRAN) 4 MG tablet Take 4 mg by mouth every 6 (six) hours as needed.    PNV,calcium 72-iron-folic acid (PRENATAL VITAMIN PLUS LOW IRON) 27 mg iron- 1 mg Tab Take 1 tablet (1 each total) by mouth once daily.       Review of patient's allergies indicates:  No Known Allergies     Family History       Problem Relation (Age of Onset)    Bone cancer Maternal Grandmother    Cancer Maternal Grandmother, Maternal Grandfather, Maternal Aunt    Diabetes Father, Maternal Aunt, Maternal Aunt, Maternal Aunt    Heart failure Mother    Hypertension Mother    Kidney failure Maternal Aunt    Pancreatic cancer Maternal Grandfather    Stroke Maternal Aunt          Tobacco Use    Smoking status: Former    Smokeless tobacco: Never   Substance and Sexual Activity    Alcohol use: Not Currently    Drug use: Not Currently    Sexual activity: Yes     Partners: Male     Birth control/protection: None     Review of Systems   Constitutional:  Negative for activity change, appetite change, fatigue and unexpected weight change.   HENT: Negative.     Respiratory:  Negative for cough, shortness of breath and wheezing.    Cardiovascular:  Negative for chest pain, palpitations and leg swelling.   Gastrointestinal:  Negative for abdominal pain,  bloating, blood in stool, constipation, diarrhea, nausea, vomiting and reflux.   Genitourinary:  Negative for bladder incontinence, decreased libido, dysmenorrhea, dyspareunia, dysuria, flank pain, frequency, menorrhagia, menstrual problem, pelvic pain, urgency, vaginal bleeding, vaginal discharge, postcoital bleeding and vaginal odor.   Musculoskeletal:  Negative for back pain.   Integumentary:  Negative for rash, acne, hair changes, mole/lesion, breast mass, nipple discharge, breast skin changes and breast tenderness.   Neurological:  Negative for headaches.   Psychiatric/Behavioral:  Negative for depression and sleep disturbance. The patient is not nervous/anxious.    Breast: Negative for asymmetry, breast self exam, lump, mass, nipple discharge, skin changes and tenderness     Objective:     Vital Signs (Most Recent):  Temp: 99 °F (37.2 °C) (11/02/23 0245)  Pulse: 80 (11/02/23 0245)  Resp: 20 (11/02/23 0245)  BP: 119/71 (11/02/23 0245)  SpO2: 99 % (11/02/23 0245) Vital Signs (24h Range):  Temp:  [99 °F (37.2 °C)] 99 °F (37.2 °C)  Pulse:  [80] 80  Resp:  [20] 20  SpO2:  [99 %] 99 %  BP: (119)/(71) 119/71     Weight: 112.2 kg (247 lb 6.4 oz)  Body mass index is 38.75 kg/m².    FHT: 140sCat 1 (reassuring)  TOCO:  Q 5 minutes     Physical Exam:   Constitutional: She is oriented to person, place, and time. She appears well-developed and well-nourished.    HENT:   Head: Normocephalic and atraumatic.    Eyes: Pupils are equal, round, and reactive to light.     Cardiovascular:  Normal rate, regular rhythm, normal heart sounds and intact distal pulses.      Exam reveals no clubbing, no cyanosis and no edema.        Pulmonary/Chest: Effort normal and breath sounds normal.        Abdominal: Soft. Bowel sounds are normal.     Genitourinary:    Vagina and uterus normal.             Musculoskeletal: Normal range of motion and moves all extremeties. No edema.       Neurological: She is alert and oriented to person, place, and  time.    Skin: Skin is warm and dry. No cyanosis. Nails show no clubbing.    Psychiatric: She has a normal mood and affect. Her behavior is normal. Judgment and thought content normal.          Significant Labs:  Lab Results   Component Value Date    GROUPTRH O POS 11/02/2023    HEPBSAG Non-Reactive 05/08/2023       I have personallly reviewed all pertinent lab results from the last 24 hours.  Recent Lab Results         11/02/23  0440   11/02/23  0435        Aniso   1+       Appearance, UA Ex.Turbid         Baso #   0.04       Basophil %   0.3       Bilirubin (UA) Negative         Color, UA Yellow         Differential Method   Scan Smear       Eos #   0.11       Eosinophil %   0.9       Glucose, UA Normal         Group & Rh   O POS       Hematocrit   32.3       Hemoglobin   10.5       HIV 1/2 Ag/Ab   Non-Reactive       Hypo   Few       Immature Grans (Abs)   0.10       Immature Granulocytes   0.8       INDIRECT JIGNESH   NEG       Ketones, UA Negative         Leukocytes, UA Trace         Lymph #   3.12       Lymph %   25.1       MCH   23.6       MCHC   32.5       MCV   72.7       Microcytosis   1+       Mono #   0.77       Mono %   6.2       MPV   10.8       Mucous Occasional         Neutrophils, Abs   8.27       Neutrophils Relative   66.7       NITRITE UA Negative         nRBC   0.0       NUCLEATED RBC ABSOLUTE   0.00       Occult Blood UA Moderate         pH, UA 6.5         PLATELET MORPHOLOGY   Few Large Platelets  Comment: Platelet clumping       Platelet Count   252       Protein, UA 30         RBC   4.44       RBC, UA 78         RDW   17.2       Specific [place], UA 1.011         Specimen Outdate   11/05/2023 23:59       Squamous Epithelial Cells, UA Moderate  [C]         UROBILINOGEN UA Normal         WBC, UA 8  [C]         WBC   12.41                [C] - Corrected Result

## 2023-11-02 NOTE — L&D DELIVERY NOTE
[facility] -  Labor and Delivery   Section   Operative Note    SUMMARY     Date of Procedure: 2023     Procedure: Procedure(s) (LRB):   SECTION (N/A)    Surgeon(s) and Role:     * Jim England MD - Primary    Assisting Surgeon: None    Pre-Operative Diagnosis: History of  delivery, currently pregnant [O34.219]  PROM  38 weeks  Post-Operative Diagnosis: Post-Op Diagnosis Codes:     * History of  delivery, currently pregnant [O34.219]  same  Anesthesia: Spinal/Epidural    Technical Procedures Used: repeat LTCS           Description of the Findings of the Procedure: liveborn male , Meconsium stained fluid    Significant Surgical Tasks Conducted by the Assistant(s), if Applicable: none    Complications: No    Blood Loss: * No values recorded between 2023 10:35 AM and 2023 11:02 AM *     With patient in supine position, the legs are  and Arrington Catheter placed and positioning to supine done.   Abdomen prepped with Chloroprep and 3 minute drying time allowed prior to draping of the abdomen.   Time out taken with OR team members.  Following informed consent the patient was taken to the operating room where spinal anesthesia was administered without difficulty. She was prepped and draped in the usual sterile fashion and placed in the dorsal lithotomy position. A Pfannenstiel skin incision was made with the scalpel and the underlying layer of fascia, entered with the Bovie. The fascial incision was extended laterally. The inferior and superior aspects of the fascial incision were grasped with [place] clamps, elevated and the rectus muscles dissected off bluntly. The rectus muscles were then  in the midline and the peritoneum identified and entered with Metzenbaum scissors. This was then extended superiorly and inferiorly with good visualization of the bladder. The bladder blade was inserted and the vesicouterine peritoneum identified and  "entered with the Metzenbaum scissors. This incision extended superiorly and inferiorly and a bladder flap created digitally. The bladder blade was then reinserted and the uterus incised in a transverse fashion with the scalpel. This was extended laterally. The baby's head was delivered atraumatically. The nose and mouth were bulb suctioned. The cord was clamped and cut infant the infant was handed off to the waiting nurses. Cord blood was sent. Placenta was then removed manually and the uterus cleared of all clots and debris  The uterine incision was repaired with #1 Vicryl in a running locked fashion. A second layer of the same suture was used to obtain hemostasis. The gutters when cleared of all clots and debris and once again hemostasis found be satisfactory.  All instruments were then removed from the abdomen. The fascial incision per 0 Vicryl in a running fashion. Skin was closed with interrupted staples. At the end procedure all sponge lap needle sponge counts correct ×2. Patient taken cover awake and stable condition.               Specimens:   Specimen (24h ago, onward)      None            Condition: Good    VTE Risk Mitigation (From admission, onward)           Ordered     IP VTE LOW RISK PATIENT  Once         23     Place sequential compression device  Until discontinued         23                    Disposition: PACU - hemodynamically stable.    Attestation: Good         Delivery Information for Juan Camejo    Birth information:  YOB: 2025   Time of birth: 10:41 AM   Sex: male   Head Delivery Date/Time: 2023 10:41 AM   Delivery type: , Low Transverse   Gestational Age: 38w5d        Delivery Providers    Delivering clinician: Jim England MD           Measurements    Weight: 3600 g  Weight (lbs): 7 lb 15 oz  Length: 50.8 cm  Length (in): 20"         Apgars    Living status: Living  Apgar Component Scores:  1 min.:  5 min.:  10 min.:  15 " min.:  20 min.:    Skin color:  0  1       Heart rate:  2  2       Reflex irritability:  2  2       Muscle tone:  2  2       Respiratory effort:  2  2       Total:  8  9                Operative Delivery    Forceps attempted?: No  Vacuum extractor attempted?: Yes  Vacuum type: Kiwi  Vacuum application location: Outlet  First attempt time vacuum applied: 2023 10:41:00  First attempt time vacuum removed: 2023 10:41:00  Number of pop offs: 0  Number of pulls with vacuum: 1  Total vacuum application time: 5 seconds  Vacuum applied by: DR. BURRIS  Failed vacuum delivery?: No         Shoulder Dystocia    Shoulder dystocia present?: No           Presentation    Presentation: Vertex  Position: Left Occiput Anterior           Interventions/Resuscitation    Method: Bulb Suctioning, Deep Suctioning       Cord    Vessels: 3 vessels  Complications: None  Cord Blood Disposition: Lab, Sent with Baby  Gases Sent?: No  Stem Cell Collection (by MD): No       Placenta    Placenta delivery date/time: 2023 1043  Placenta removal: Manual removal  Placenta appearance: Intact  Placenta disposition: Discarded           Labor Events:       labor:       Labor Onset Date/Time:         Dilation Complete Date/Time:         Start Pushing Date/Time:       Rupture Date/Time:            Rupture type:          Fluid Amount:       Fluid Color: Meconium Thin       steroids:       Antibiotics given for GBS:       Induction:       Indications for induction:        Augmentation:       Indications for augmentation:       Labor complications:       Additional complications:          Cervical ripening:                     Delivery:      Episiotomy:       Indication for Episiotomy:       Perineal Lacerations:   Repaired:      Periurethral Laceration:   Repaired:     Labial Laceration:   Repaired:     Sulcus Laceration:   Repaired:     Vaginal Laceration:   Repaired:     Cervical Laceration:   Repaired:     Repair suture:        Repair # of packets:       Last Value - EBL - Nursing (mL):       Sum - EBL - Nursing (mL): 0     Last Value - EBL - Anesthesia (mL):      Calculated QBL (mL):       Vaginal Sweep Performed:       Surgicount Correct:         Other providers:       Anesthesia    Method: Spinal          Details (if applicable):  Trial of Labor No    Categorization: Repeat    Priority: Routine   Indications for : Repeat Section   Incision Type: low transverse     Additional  information:  Forceps:    Vacuum:    Breech:    Observed anomalies    Other (Comments):

## 2023-11-02 NOTE — ANESTHESIA PREPROCEDURE EVALUATION
11/02/2023  Nigel Camejo is a 28 y.o., female.      Pre-op Assessment    I have reviewed the Patient Summary Reports.     I have reviewed the Nursing Notes. I have reviewed the NPO Status.   I have reviewed the Medications.     Review of Systems  Anesthesia Hx:  No problems with previous Anesthesia    Social:  Non-Smoker, No Alcohol Use    Hematology/Oncology:  Hematology Normal   Oncology Normal     EENT/Dental:EENT/Dental Normal   Cardiovascular:  Cardiovascular Normal     Pulmonary:  Pulmonary Normal    Renal/:  Renal/ Normal     Hepatic/GI:  Hepatic/GI Normal    Musculoskeletal:  Musculoskeletal Normal    OB/GYN/PEDS:  Iup, term, repeat c/s   Neurological:  Neurology Normal    Endocrine:  Endocrine Normal    Dermatological:  Skin Normal    Psych:  Psychiatric Normal           Physical Exam  General: Well nourished    Airway:  Mallampati: III / III  Mouth Opening: Normal  TM Distance: > 6 cm  Tongue: Normal  Neck ROM: Normal ROM    Chest/Lungs:  Clear to auscultation, Normal Respiratory Rate    Heart:  Rate: Normal  Rhythm: Regular Rhythm        Anesthesia Plan  Type of Anesthesia, risks & benefits discussed:    Anesthesia Type: Spinal  Intra-op Monitoring Plan: Standard ASA Monitors  Post Op Pain Control Plan: multimodal analgesia  Induction:  IV  Informed Consent: Informed consent signed with the Patient and all parties understand the risks and agree with anesthesia plan.  All questions answered. Patient consented to blood products? Yes  ASA Score: 2  Day of Surgery Review of History & Physical: H&P Update referred to the surgeon/provider.I have interviewed and examined the patient. I have reviewed the patient's H&P dated: There are no significant changes. H&P completed by Anesthesiologist.    Ready For Surgery From Anesthesia Perspective.     .

## 2023-11-02 NOTE — H&P
[facility] -  Labor and Delivery  Obstetrics  History & Physical    Patient Name: Nigel Camejo  MRN: [id number]  Admission Date: 2023  Primary Care Provider: Deysi, Primary Doctor    Subjective:     Principal Problem:Full-term premature rupture of membranes with onset of labor within 24 hours of rupture    History of Present Illness:  This is a 28-year-old  002 admitted at 38 weeks 5 day secondary to spontaneous rupture membrane.  Prenatal care with Dr. Hair.  Patient with history of previous  x2 with repeat scheduled  section for Monday.  She presented to labor and delivery with documented premature rupture membranes and therefore admitted for  section this morning.      Obstetric HPI:  Patient reports Frequency: Every 5 minutes contractions, active fetal movement, No vaginal bleeding , Yes loss of fluid     This pregnancy has been complicated by previous  x 2    OB History    Para Term  AB Living   3 2 2 0 0 2   SAB IAB Ectopic Multiple Live Births   0 0 0 0 0      # Outcome Date GA Lbr Volodymyr/2nd Weight Sex Delivery Anes PTL Lv   3 Current            2 Term            1 Term              No past medical history on file.  Past Surgical History:   Procedure Laterality Date     SECTION      x2       PTA Medications   Medication Sig    docusate sodium (COLACE) 100 MG capsule Take 1 capsule (100 mg total) by mouth 2 (two) times daily.    ondansetron (ZOFRAN) 4 MG tablet Take 4 mg by mouth every 6 (six) hours as needed.    PNV,calcium 72-iron-folic acid (PRENATAL VITAMIN PLUS LOW IRON) 27 mg iron- 1 mg Tab Take 1 tablet (1 each total) by mouth once daily.       Review of patient's allergies indicates:  No Known Allergies     Family History       Problem Relation (Age of Onset)    Bone cancer Maternal Grandmother    Cancer Maternal Grandmother, Maternal Grandfather, Maternal Aunt    Diabetes Father, Maternal Aunt, Maternal Aunt, Maternal Aunt     Heart failure Mother    Hypertension Mother    Kidney failure Maternal Aunt    Pancreatic cancer Maternal Grandfather    Stroke Maternal Aunt          Tobacco Use    Smoking status: Former    Smokeless tobacco: Never   Substance and Sexual Activity    Alcohol use: Not Currently    Drug use: Not Currently    Sexual activity: Yes     Partners: Male     Birth control/protection: None     Review of Systems   Constitutional:  Negative for activity change, appetite change, fatigue and unexpected weight change.   HENT: Negative.     Respiratory:  Negative for cough, shortness of breath and wheezing.    Cardiovascular:  Negative for chest pain, palpitations and leg swelling.   Gastrointestinal:  Negative for abdominal pain, bloating, blood in stool, constipation, diarrhea, nausea, vomiting and reflux.   Genitourinary:  Negative for bladder incontinence, decreased libido, dysmenorrhea, dyspareunia, dysuria, flank pain, frequency, menorrhagia, menstrual problem, pelvic pain, urgency, vaginal bleeding, vaginal discharge, postcoital bleeding and vaginal odor.   Musculoskeletal:  Negative for back pain.   Integumentary:  Negative for rash, acne, hair changes, mole/lesion, breast mass, nipple discharge, breast skin changes and breast tenderness.   Neurological:  Negative for headaches.   Psychiatric/Behavioral:  Negative for depression and sleep disturbance. The patient is not nervous/anxious.    Breast: Negative for asymmetry, breast self exam, lump, mass, nipple discharge, skin changes and tenderness     Objective:     Vital Signs (Most Recent):  Temp: 99 °F (37.2 °C) (11/02/23 0245)  Pulse: 80 (11/02/23 0245)  Resp: 20 (11/02/23 0245)  BP: 119/71 (11/02/23 0245)  SpO2: 99 % (11/02/23 0245) Vital Signs (24h Range):  Temp:  [99 °F (37.2 °C)] 99 °F (37.2 °C)  Pulse:  [80] 80  Resp:  [20] 20  SpO2:  [99 %] 99 %  BP: (119)/(71) 119/71     Weight: 112.2 kg (247 lb 6.4 oz)  Body mass index is 38.75 kg/m².    FHT: 140sCat 1  (reassuring)  TOCO:  Q 5 minutes     Physical Exam:   Constitutional: She is oriented to person, place, and time. She appears well-developed and well-nourished.    HENT:   Head: Normocephalic and atraumatic.    Eyes: Pupils are equal, round, and reactive to light.     Cardiovascular:  Normal rate, regular rhythm, normal heart sounds and intact distal pulses.      Exam reveals no clubbing, no cyanosis and no edema.        Pulmonary/Chest: Effort normal and breath sounds normal.        Abdominal: Soft. Bowel sounds are normal.     Genitourinary:    Vagina and uterus normal.             Musculoskeletal: Normal range of motion and moves all extremeties. No edema.       Neurological: She is alert and oriented to person, place, and time.    Skin: Skin is warm and dry. No cyanosis. Nails show no clubbing.    Psychiatric: She has a normal mood and affect. Her behavior is normal. Judgment and thought content normal.          Significant Labs:  Lab Results   Component Value Date    GROUPTRH O POS 11/02/2023    HEPBSAG Non-Reactive 05/08/2023       I have personallly reviewed all pertinent lab results from the last 24 hours.  Recent Lab Results         11/02/23  0440   11/02/23  0435        Aniso   1+       Appearance, UA Ex.Turbid         Baso #   0.04       Basophil %   0.3       Bilirubin (UA) Negative         Color, UA Yellow         Differential Method   Scan Smear       Eos #   0.11       Eosinophil %   0.9       Glucose, UA Normal         Group & Rh   O POS       Hematocrit   32.3       Hemoglobin   10.5       HIV 1/2 Ag/Ab   Non-Reactive       Hypo   Few       Immature Grans (Abs)   0.10       Immature Granulocytes   0.8       INDIRECT JIGNESH   NEG       Ketones, UA Negative         Leukocytes, UA Trace         Lymph #   3.12       Lymph %   25.1       MCH   23.6       MCHC   32.5       MCV   72.7       Microcytosis   1+       Mono #   0.77       Mono %   6.2       MPV   10.8       Mucous Occasional          Neutrophils, Abs   8.27       Neutrophils Relative   66.7       NITRITE UA Negative         nRBC   0.0       NUCLEATED RBC ABSOLUTE   0.00       Occult Blood UA Moderate         pH, UA 6.5         PLATELET MORPHOLOGY   Few Large Platelets  Comment: Platelet clumping       Platelet Count   252       Protein, UA 30         RBC   4.44       RBC, UA 78         RDW   17.2       Specific [place], UA 1.011         Specimen Outdate   2023 23:59       Squamous Epithelial Cells, UA Moderate  [C]         UROBILINOGEN UA Normal         WBC, UA 8  [C]         WBC   12.41                [C] - Corrected Result             Assessment/Plan:     28 y.o. female  at 38w5d for:    * Full-term premature rupture of membranes with onset of labor within 24 hours of rupture  Admit to L&D    38 weeks gestation of pregnancy  Admit      Previous  section complicating pregnancy  Admit for repeat  section        Jim England MD  Obstetrics  [facility] -  Labor and Delivery

## 2023-11-02 NOTE — TRANSFER OF CARE
"Anesthesia Transfer of Care Note    Patient: Nigel Camejo    Procedure(s) Performed: Procedure(s) (LRB):   SECTION (N/A)    Patient location: Labor and Delivery    Anesthesia Type: spinal    Transport from OR: Transported from OR on room air with adequate spontaneous ventilation    Post pain: adequate analgesia    Post assessment: no apparent anesthetic complications    Post vital signs: stable    Level of consciousness: awake, alert and oriented    Nausea/Vomiting: no nausea/vomiting    Complications: none    Transfer of care protocol was followedComments: Pt appropriate, VSS, NAD noted, Voices appreciation for Care, RTRN      Last vitals:   Visit Vitals  /64   Pulse 90   Temp 36.4 °C (97.5 °F)   Resp 16   Ht 5' 7" (1.702 m)   Wt 112.2 kg (247 lb 6.4 oz)   LMP 2023 (Exact Date)   SpO2 100%   Breastfeeding No   BMI 38.75 kg/m²     "

## 2023-11-03 PROBLEM — Z98.891 STATUS POST CESAREAN DELIVERY: Status: ACTIVE | Noted: 2023-11-03

## 2023-11-03 LAB
ANISOCYTOSIS BLD QL SMEAR: NORMAL
BASOPHILS # BLD AUTO: 0.05 K/UL (ref 0–0.2)
BASOPHILS NFR BLD AUTO: 0.4 % (ref 0–1)
DIFFERENTIAL METHOD BLD: ABNORMAL
EOSINOPHIL # BLD AUTO: 0.06 K/UL (ref 0–0.5)
EOSINOPHIL NFR BLD AUTO: 0.4 % (ref 1–4)
ERYTHROCYTE [DISTWIDTH] IN BLOOD BY AUTOMATED COUNT: 17.2 % (ref 11.5–14.5)
HCT VFR BLD AUTO: 30 % (ref 38–47)
HGB BLD-MCNC: 9.7 G/DL (ref 12–16)
IMM GRANULOCYTES # BLD AUTO: 0.07 K/UL (ref 0–0.04)
IMM GRANULOCYTES NFR BLD: 0.5 % (ref 0–0.4)
LYMPHOCYTES # BLD AUTO: 2.74 K/UL (ref 1–4.8)
LYMPHOCYTES NFR BLD AUTO: 19.7 % (ref 27–41)
MCH RBC QN AUTO: 24 PG (ref 27–31)
MCHC RBC AUTO-ENTMCNC: 32.3 G/DL (ref 32–36)
MCV RBC AUTO: 74.1 FL (ref 80–96)
MICROCYTES BLD QL SMEAR: NORMAL
MONOCYTES # BLD AUTO: 0.81 K/UL (ref 0–0.8)
MONOCYTES NFR BLD AUTO: 5.8 % (ref 2–6)
MPC BLD CALC-MCNC: 11.1 FL (ref 9.4–12.4)
NEUTROPHILS # BLD AUTO: 10.2 K/UL (ref 1.8–7.7)
NEUTROPHILS NFR BLD AUTO: 73.2 % (ref 53–65)
NRBC # BLD AUTO: 0 X10E3/UL
NRBC, AUTO (.00): 0 %
OVALOCYTES BLD QL SMEAR: NORMAL
PLATELET # BLD AUTO: 193 K/UL (ref 150–400)
PLATELET MORPHOLOGY: NORMAL
POLYCHROMASIA BLD QL SMEAR: NORMAL
RBC # BLD AUTO: 4.05 M/UL (ref 4.2–5.4)
WBC # BLD AUTO: 13.93 K/UL (ref 4.5–11)

## 2023-11-03 PROCEDURE — 63600175 PHARM REV CODE 636 W HCPCS: Performed by: ANESTHESIOLOGY

## 2023-11-03 PROCEDURE — 25000003 PHARM REV CODE 250: Performed by: ANESTHESIOLOGY

## 2023-11-03 PROCEDURE — 85025 COMPLETE CBC W/AUTO DIFF WBC: CPT | Performed by: OBSTETRICS & GYNECOLOGY

## 2023-11-03 PROCEDURE — 25000003 PHARM REV CODE 250: Performed by: OBSTETRICS & GYNECOLOGY

## 2023-11-03 PROCEDURE — 11000001 HC ACUTE MED/SURG PRIVATE ROOM

## 2023-11-03 RX ORDER — HYDROCODONE BITARTRATE AND ACETAMINOPHEN 7.5; 325 MG/1; MG/1
1 TABLET ORAL EVERY 6 HOURS PRN
Qty: 28 TABLET | Refills: 0 | Status: SHIPPED | OUTPATIENT
Start: 2023-11-03 | End: 2024-02-15 | Stop reason: ALTCHOICE

## 2023-11-03 RX ORDER — FERROUS SULFATE 325(65) MG
325 TABLET, DELAYED RELEASE (ENTERIC COATED) ORAL
Qty: 90 TABLET | Refills: 1 | Status: SHIPPED | OUTPATIENT
Start: 2023-11-03

## 2023-11-03 RX ADMIN — DOCUSATE SODIUM 200 MG: 100 CAPSULE, LIQUID FILLED ORAL at 08:11

## 2023-11-03 RX ADMIN — KETOROLAC TROMETHAMINE 30 MG: 30 INJECTION INTRAMUSCULAR; INTRAVENOUS at 12:11

## 2023-11-03 RX ADMIN — DOCUSATE SODIUM 200 MG: 100 CAPSULE, LIQUID FILLED ORAL at 09:11

## 2023-11-03 RX ADMIN — IBUPROFEN 800 MG: 800 TABLET, FILM COATED ORAL at 09:11

## 2023-11-03 RX ADMIN — HYDROCODONE BITARTRATE AND ACETAMINOPHEN 1 TABLET: 7.5; 325 TABLET ORAL at 09:11

## 2023-11-03 RX ADMIN — KETOROLAC TROMETHAMINE 30 MG: 30 INJECTION INTRAMUSCULAR; INTRAVENOUS at 05:11

## 2023-11-03 RX ADMIN — ACETAMINOPHEN 650 MG: 325 TABLET ORAL at 06:11

## 2023-11-03 RX ADMIN — ACETAMINOPHEN 650 MG: 325 TABLET ORAL at 12:11

## 2023-11-03 RX ADMIN — Medication 1 TABLET: at 08:11

## 2023-11-03 NOTE — PLAN OF CARE
[facility] -  Labor and Delivery  Initial Discharge Assessment       Primary Care Provider: No, Primary Doctor    Admission Diagnosis: Full-term premature rupture of membranes with onset of labor within 24 hours of rupture [O42.02]  38 weeks gestation of pregnancy [Z3A.38]  Previous  section complicating pregnancy [O34.219]  35 weeks gestation of pregnancy [Z3A.35]    Admission Date: 2023  Expected Discharge Date: 2023    Transition of Care Barriers: None    Payor: MEDICAID [place] / Plan: MEDICAID [facility] [place]AN [place] / Product Type: Managed Medicaid /     Extended Emergency Contact Information  Primary Emergency Contact: Edwin Malhotra  Address: [facility] [place] [address] [facility]  Mobile Phone: [phone]  Relation: Significant other    Discharge Plan A: Home  Discharge Plan B: Home      [facility] [place] - [address]  [address]  [place] [address]  Phone: [phone] Fax: [phone]    [facility] [address] [place] [address] [address] [place] [address]  [address] [place] [address]  Phone: [phone] Fax: [phone]    [facility] [address]  [address] [place] [address]  Phone: [phone] Fax: [phone]      Initial Assessment (most recent)       Adult Discharge Assessment - 23 1507          Discharge Assessment    Assessment Type Discharge Planning Assessment     Source of Information patient     Communicated JOAN with patient/caregiver Date not available/Unable to determine     People in Home significant other;child(andria), dependent     Do you expect to return to your current living situation? Yes     Do you have help at home or someone to help you manage your care at home? Yes     Who are your caregiver(s) and their phone number(s)? significant other Edwin Malhotra [phone]     Patient currently being followed by outpatient case  management? No     Do you currently have service(s) that help you manage your care at home? No     Who is going to help you get home at discharge? family     How do you get to doctors appointments? family or friend will provide     Discharge Plan discussed with: Patient     Transition of Care Barriers None     Discharge Plan A Home     Discharge Plan B Home                        Consult for urine drug screen positive for THC. Spoke with mother in her room. She lives with her boyfriend. 2 other children in home. Made mother aware of infant positive drug screen. She voiced understanding on cps report. Report made online[place] [id number].

## 2023-11-03 NOTE — PROGRESS NOTES
Subjective: Pt is comfortable and without compliant    Objective:   VSS  Uterus firm, Incision clean , dry and intact  Moderate lochia  Extremities- no cyanosis, clubbing or edema      Assessment:  POD #1 s/p LTCS- stable    Plan: Routine postpartum and postop care with discharge planning

## 2023-11-03 NOTE — HOSPITAL COURSE
This is a 28-year-old  003 admitted at 38 week 5 day secondary spontaneous rupture membranes.  Patient with history of x2 and therefore admitted for repeat  section.  Patient delivered a live-born male baby weighing 7 lb 15 oz and Apgars of 8 and 9 via repeat  section.  The patient's hospital course was unremarkable by postoperative day 2. She was ready for discharge.  Disposition was to home.  Condition stable.  Patient is formula feeding and undecided regarding her method of contraception.

## 2023-11-04 VITALS
HEART RATE: 69 BPM | BODY MASS INDEX: 38.83 KG/M2 | DIASTOLIC BLOOD PRESSURE: 58 MMHG | OXYGEN SATURATION: 99 % | SYSTOLIC BLOOD PRESSURE: 115 MMHG | RESPIRATION RATE: 18 BRPM | HEIGHT: 67 IN | TEMPERATURE: 98 F | WEIGHT: 247.38 LBS

## 2023-11-04 LAB — UA COMPLETE W REFLEX CULTURE PNL UR: NORMAL

## 2023-11-04 PROCEDURE — 25000003 PHARM REV CODE 250: Performed by: OBSTETRICS & GYNECOLOGY

## 2023-11-04 RX ADMIN — DOCUSATE SODIUM 200 MG: 100 CAPSULE, LIQUID FILLED ORAL at 08:11

## 2023-11-04 RX ADMIN — HYDROCODONE BITARTRATE AND ACETAMINOPHEN 1 TABLET: 7.5; 325 TABLET ORAL at 04:11

## 2023-11-04 RX ADMIN — Medication 1 TABLET: at 08:11

## 2023-11-04 RX ADMIN — IBUPROFEN 800 MG: 800 TABLET, FILM COATED ORAL at 08:11

## 2023-11-06 LAB — SYPHILIS AB INTERPRETATION: NORMAL

## 2023-11-06 NOTE — DISCHARGE SUMMARY
"[facility] -  Labor and Delivery  Obstetrics  Discharge Summary      Patient Name: Nigel Camejo  MRN: [id number]  Admission Date: 2023  Hospital Length of Stay: 2 days  Discharge Date and Time: 2023 10:00 AM  Attending Physician: Deysi att. providers found   Discharging Provider: Jim England MD   Primary Care Provider: Deysi, Primary Doctor    HPI: This is a 28-year-old  002 admitted at 38 weeks 5 day secondary to spontaneous rupture membrane.  Prenatal care with Dr. Hair.  Patient with history of previous  x2 with repeat scheduled  section for Monday.  She presented to labor and delivery with documented premature rupture membranes and therefore admitted for  section this morning.          Procedure(s) (LRB):   SECTION (N/A)     Hospital Course:   This is a 28-year-old  003 admitted at 38 week 5 day secondary spontaneous rupture membranes.  Patient with history of x2 and therefore admitted for repeat  section.  Patient delivered a live-born male baby weighing 7 lb 15 oz and Apgars of 8 and 9 via repeat  section.  The patient's hospital course was unremarkable by postoperative day 2. She was ready for discharge.  Disposition was to home.  Condition stable.  Patient is formula feeding and undecided regarding her method of contraception.           Final Active Diagnoses:    Diagnosis Date Noted POA    PRINCIPAL PROBLEM:  Status post  delivery [Z98.891] 2023 Not Applicable    Full-term premature rupture of membranes with onset of labor within 24 hours of rupture [O42.02] 2023 Unknown    Previous  section complicating pregnancy [O34.219] 2023 Unknown    38 weeks gestation of pregnancy [Z3A.38] 2023 Not Applicable      Problems Resolved During this Admission:        Significant Diagnostic Studies: Labs: CBC No results for input(s): "WBC", "HGB", "HCT", "PLT" in the last 48 hours.      Feeding " "Method: bottle    Immunizations     Date Immunization Status Dose Route/Site Given by    23 1317 MMR Deleted 0.5 mL Subcutaneous/     23 0854 Tdap Deferred 0.5 mL Intramuscular/ Jacqueline Gallardo RN          Delivery:    Episiotomy:     Lacerations:     Repair suture:     Repair # of packets:     Blood loss (ml):       Birth information:  YOB: 2025   Time of birth: 10:41 AM   Sex: male   Delivery type: , Low Transverse   Gestational Age: 38w5d     Measurements    Weight: 3600 g  Weight (lbs): 7 lb 15 oz  Length: 50.8 cm  Length (in): 20"         Delivery Clinician: Delivery Providers    Delivering clinician: Jim England MD          Additional  information:  Forceps:    Vacuum:    Breech:    Observed anomalies      Living?:     Apgars    Living status: Living  Apgar Component Scores:  1 min.:  5 min.:  10 min.:  15 min.:  20 min.:    Skin color:  0  1       Heart rate:  2  2       Reflex irritability:  2  2       Muscle tone:  2  2       Respiratory effort:  2  2       Total:  8  9              Placenta: Delivered:       appearance    Pending Diagnostic Studies:     Procedure Component Value Units Date/Time    Syphilis Antibody with reflex to RPR [[id number]] Collected: 23    Order Status: Sent Lab Status: In process Updated: 23    Specimen: Blood           Discharged Condition: good    Disposition: Home or Self Care    Follow Up:   Follow-up Information     Jim England MD Follow up in 2 week(s).    Specialty: Obstetrics and Gynecology  Contact information:  [address] [place] [address]  [facility] [address]  [phone]                       Patient Instructions:      Diet Adult Regular     Pelvic Rest     Notify your health care provider if you experience any of the following:  temperature >100.4     Notify your health care provider if you experience any of the following:  persistent nausea and vomiting or diarrhea     Notify " your health care provider if you experience any of the following:  severe uncontrolled pain     Notify your health care provider if you experience any of the following:  redness, tenderness, or signs of infection (pain, swelling, redness, odor or green/yellow discharge around incision site)     Notify your health care provider if you experience any of the following:  difficulty breathing or increased cough     Notify your health care provider if you experience any of the following:  severe persistent headache     Notify your health care provider if you experience any of the following:  worsening rash     Notify your health care provider if you experience any of the following:  persistent dizziness, light-headedness, or visual disturbances     Notify your health care provider if you experience any of the following:  increased confusion or weakness     Medications:  Discharge Medication List as of 11/4/2023  9:16 AM      START taking these medications    Details   ferrous sulfate 325 (65 FE) MG EC tablet Take 1 tablet (325 mg total) by mouth 3 (three) times daily with meals., Starting Fri 11/3/2023, Normal      HYDROcodone-acetaminophen (NORCO) 7.5-325 mg per tablet Take 1 tablet by mouth every 6 (six) hours as needed for Pain., Starting Fri 11/3/2023, Normal         CONTINUE these medications which have NOT CHANGED    Details   docusate sodium (COLACE) 100 MG capsule Take 1 capsule (100 mg total) by mouth 2 (two) times daily., Starting Thu 5/11/2023, Until Fri 5/10/2024, Normal      ondansetron (ZOFRAN) 4 MG tablet Take 4 mg by mouth every 6 (six) hours as needed., Starting Mon 5/22/2023, Historical Med      PNV,calcium 72-iron-folic acid (PRENATAL VITAMIN PLUS LOW IRON) 27 mg iron- 1 mg Tab Take 1 tablet (1 each total) by mouth once daily., Starting Mon 4/10/2023, Until Tue 4/9/2024, Normal             Jim England MD  Obstetrics  [facility] -  Labor and Delivery

## 2023-11-06 NOTE — PLAN OF CARE
[facility] -  Labor and Delivery  Discharge Final Note    Primary Care Provider: No, Primary Doctor    Expected Discharge Date: 11/4/2023    Final Discharge Note (most recent)       Final Note - 11/06/23 0818          Final Note    Assessment Type Final Discharge Note     Anticipated Discharge Disposition Home or Self Care                     Important Message from Medicare             Contact Info       Jim England MD   Specialty: Obstetrics and Gynecology    [address] [place] [address]  [facility] [address]   Phone: [phone]       Next Steps: Follow up in 2 week(s)          Home. Cps case pending for infant.

## 2023-11-07 ENCOUNTER — OFFICE VISIT (OUTPATIENT)
Dept: OBSTETRICS AND GYNECOLOGY | Facility: CLINIC | Age: 28
End: 2023-11-07
Payer: MEDICAID

## 2023-11-07 ENCOUNTER — TELEPHONE (OUTPATIENT)
Dept: OBSTETRICS AND GYNECOLOGY | Facility: CLINIC | Age: 28
End: 2023-11-07
Payer: MEDICAID

## 2023-11-07 VITALS
DIASTOLIC BLOOD PRESSURE: 90 MMHG | BODY MASS INDEX: 37.4 KG/M2 | HEART RATE: 55 BPM | WEIGHT: 238.81 LBS | SYSTOLIC BLOOD PRESSURE: 144 MMHG

## 2023-11-07 DIAGNOSIS — Z30.013 ENCOUNTER FOR INITIAL PRESCRIPTION OF INJECTABLE CONTRACEPTIVE: Primary | ICD-10-CM

## 2023-11-07 PROCEDURE — 1111F PR DISCHARGE MEDS RECONCILED W/ CURRENT OUTPATIENT MED LIST: ICD-10-PCS | Mod: CPTII,,, | Performed by: OBSTETRICS & GYNECOLOGY

## 2023-11-07 PROCEDURE — 1159F MED LIST DOCD IN RCRD: CPT | Mod: CPTII,,, | Performed by: OBSTETRICS & GYNECOLOGY

## 2023-11-07 PROCEDURE — 96372 PR INJECTION,THERAP/PROPH/DIAG2ST, IM OR SUBCUT: ICD-10-PCS | Mod: ,,, | Performed by: OBSTETRICS & GYNECOLOGY

## 2023-11-07 PROCEDURE — 3044F PR MOST RECENT HEMOGLOBIN A1C LEVEL <7.0%: ICD-10-PCS | Mod: CPTII,,, | Performed by: OBSTETRICS & GYNECOLOGY

## 2023-11-07 PROCEDURE — 3080F PR MOST RECENT DIASTOLIC BLOOD PRESSURE >= 90 MM HG: ICD-10-PCS | Mod: CPTII,,, | Performed by: OBSTETRICS & GYNECOLOGY

## 2023-11-07 PROCEDURE — 96372 THER/PROPH/DIAG INJ SC/IM: CPT | Mod: ,,, | Performed by: OBSTETRICS & GYNECOLOGY

## 2023-11-07 PROCEDURE — 3077F PR MOST RECENT SYSTOLIC BLOOD PRESSURE >= 140 MM HG: ICD-10-PCS | Mod: CPTII,,, | Performed by: OBSTETRICS & GYNECOLOGY

## 2023-11-07 PROCEDURE — 3044F HG A1C LEVEL LT 7.0%: CPT | Mod: CPTII,,, | Performed by: OBSTETRICS & GYNECOLOGY

## 2023-11-07 PROCEDURE — 3008F BODY MASS INDEX DOCD: CPT | Mod: CPTII,,, | Performed by: OBSTETRICS & GYNECOLOGY

## 2023-11-07 PROCEDURE — 1111F DSCHRG MED/CURRENT MED MERGE: CPT | Mod: CPTII,,, | Performed by: OBSTETRICS & GYNECOLOGY

## 2023-11-07 PROCEDURE — 0503F POSTPARTUM CARE VISIT: CPT | Mod: ,,, | Performed by: OBSTETRICS & GYNECOLOGY

## 2023-11-07 PROCEDURE — 3080F DIAST BP >= 90 MM HG: CPT | Mod: CPTII,,, | Performed by: OBSTETRICS & GYNECOLOGY

## 2023-11-07 PROCEDURE — 3077F SYST BP >= 140 MM HG: CPT | Mod: CPTII,,, | Performed by: OBSTETRICS & GYNECOLOGY

## 2023-11-07 PROCEDURE — 1159F PR MEDICATION LIST DOCUMENTED IN MEDICAL RECORD: ICD-10-PCS | Mod: CPTII,,, | Performed by: OBSTETRICS & GYNECOLOGY

## 2023-11-07 PROCEDURE — 0503F PR POSTPARTUM CARE VISIT: ICD-10-PCS | Mod: ,,, | Performed by: OBSTETRICS & GYNECOLOGY

## 2023-11-07 PROCEDURE — 3008F PR BODY MASS INDEX (BMI) DOCUMENTED: ICD-10-PCS | Mod: CPTII,,, | Performed by: OBSTETRICS & GYNECOLOGY

## 2023-11-07 RX ORDER — SERTRALINE HYDROCHLORIDE 50 MG/1
50 TABLET, FILM COATED ORAL DAILY
Qty: 30 TABLET | Refills: 2 | Status: SHIPPED | OUTPATIENT
Start: 2023-11-07 | End: 2023-12-05 | Stop reason: SDUPTHER

## 2023-11-07 RX ORDER — MEDROXYPROGESTERONE ACETATE 150 MG/ML
150 INJECTION, SUSPENSION INTRAMUSCULAR
Status: SHIPPED | OUTPATIENT
Start: 2023-11-07 | End: 2025-01-30

## 2023-11-07 RX ADMIN — MEDROXYPROGESTERONE ACETATE 150 MG: 150 INJECTION, SUSPENSION INTRAMUSCULAR at 11:11

## 2023-11-07 NOTE — TELEPHONE ENCOUNTER
Patient called and stated her  incision is open in the middle more then an inch. Pt states she can come to the office today at 10:30 AM/ Pt verbalized understanding.

## 2023-11-07 NOTE — PROGRESS NOTES
Postpartum Visit  Nigel Camejo is a 28 y.o. female  is here for a postpartum visit. She is 1 weeks postpartum following a low cervical transverse  section, of a male infant weighinlb 15oz, with Anesthesia: spinal. . The delivery was at 38w 5d.     Pregnancy was complicated by:  x 2.        Postpartum course has been uncomplicated.  Bleeding no bleeding. Bowel/ bladder function is normal. Her last pap was 2023.  Patient is not sexually active. Desired contraception method is Depo-Provera injections.   Postpartum depression screening: negative.  Baby's course has been uncomplicated. Baby is feeding by bottle - Enfamil Nutramigen.     OB History    Para Term  AB Living   3 3 3     3   SAB IAB Ectopic Multiple Live Births         0 1      # Outcome Date GA Lbr Volodymyr/2nd Weight Sex Delivery Anes PTL Lv   3 Term 23 38w5d  3.6 kg (7 lb 15 oz) M CS-LTranv Spinal  HERIBERTO   2 Term            1 Term                  Review of patient's allergies indicates:  No Known Allergies    No past medical history on file.  Past Surgical History:   Procedure Laterality Date     SECTION      x2     SECTION N/A 2023    Procedure:  SECTION;  Surgeon: Jim England MD;  Location: [facility];  Service: OB/GYN;  Laterality: N/A;     OB History          3    Para   3    Term   3            AB        Living   3         SAB        IAB        Ectopic        Multiple   0    Live Births   1               Family History   Problem Relation Age of Onset    Bone cancer Maternal Grandmother     Cancer Maternal Grandmother     Pancreatic cancer Maternal Grandfather     Cancer Maternal Grandfather     Diabetes Father     Hypertension Mother     Heart failure Mother     Diabetes Maternal Aunt     Cancer Maternal Aunt     Kidney failure Maternal Aunt     Diabetes Maternal Aunt     Stroke Maternal Aunt     Diabetes Maternal Aunt      Social History      Tobacco Use    Smoking status: Former     Passive exposure: Never    Smokeless tobacco: Never   Substance Use Topics    Alcohol use: Not Currently    Drug use: Not Currently       Current Outpatient Medications   Medication Sig    docusate sodium (COLACE) 100 MG capsule Take 1 capsule (100 mg total) by mouth 2 (two) times daily.    ferrous sulfate 325 (65 FE) MG EC tablet Take 1 tablet (325 mg total) by mouth 3 (three) times daily with meals.    HYDROcodone-acetaminophen (NORCO) 7.5-325 mg per tablet Take 1 tablet by mouth every 6 (six) hours as needed for Pain.    ondansetron (ZOFRAN) 4 MG tablet Take 4 mg by mouth every 6 (six) hours as needed.    PNV,calcium 72-iron-folic acid (PRENATAL VITAMIN PLUS LOW IRON) 27 mg iron- 1 mg Tab Take 1 tablet (1 each total) by mouth once daily.    sertraline (ZOLOFT) 50 MG tablet Take 1 tablet (50 mg total) by mouth once daily.     Current Facility-Administered Medications   Medication    medroxyPROGESTERone (DEPO-PROVERA) injection 150 mg         ROS:  GENERAL: No fever, chills, fatigability.  VULVAR: No pain, no lesions and no itching.  VAGINAL: No relaxation, no itching, no discharge, no abnormal bleeding and no lesions.  ABDOMEN: No abdominal pain. Denies nausea. Denies vomiting. No diarrhea. No constipation  BREAST: Denies pain. No lumps. No discharge.  URINARY: No incontinence, no nocturia, no frequency and no dysuria.  CARDIOVASCULAR: No chest pain. No shortness of breath. No leg cramps.  NEUROLOGICAL: No headaches. No vision changes.      General appearance - alert, well appearing, and in no distress  Mental status - alert, oriented to person, place, and time  Skin - coloration normal for race, good turgor, warm to touch, no rashes  Abdomen - soft, nontender, nondistended, no masses or organomegaly  Pfannenstiel incision: Clean, dry, intact - healing well.  Pelvic -   External genitalia postpartum: normal, well-healed, without lesions or masses.  Normal female hair  distribution. Adequate perineal body. Urethral meatus without lesions or prolapse. Urethra: no masses, tenderness, or scarring.  Bladder: without tenderness or masses.  Vaginal mucosa moist and pink, normal rugae, without lesions, abnormal discharge, or foul odor.  Cervix pink, no lesions, no cervical motion tenderness.  Uterus: midline, non tender, smooth, not enlarged, not prolapsed  No adnexal masses or tenderness.  Extremities - no edema, redness or tenderness in the calves or thighs      Nigel was seen today for wound check.    Diagnoses and all orders for this visit:    Encounter for initial prescription of injectable contraceptive  -     medroxyPROGESTERone (DEPO-PROVERA) injection 150 mg    Postpartum depression  -     sertraline (ZOLOFT) 50 MG tablet; Take 1 tablet (50 mg total) by mouth once daily.        Discussed contraception - pt desires Depo Provera  Counseling regarding resuming normal activities of exercise and work.  Postpartum precautions reviewed    Routine follow up in 1 yr

## 2023-11-09 DIAGNOSIS — Z98.891 STATUS POST CESAREAN DELIVERY: Primary | ICD-10-CM

## 2023-11-09 NOTE — TELEPHONE ENCOUNTER
Pt states she was seen on Monday and was to have a Rx for antibiotics/ Informed pt that  is not in clinic today but I will send her a message/ Pt verbalized understanding.      *SEE URINE CULTURE FROM 11/02/2023*

## 2023-11-09 NOTE — TELEPHONE ENCOUNTER
----- Message from Jewels Campos MA sent at 11/8/2023 12:39 PM CST -----  [facility] pharmacy in [place] wasn't called in for antibiotics yesterday

## 2023-11-23 ENCOUNTER — PATIENT MESSAGE (OUTPATIENT)
Dept: OBSTETRICS AND GYNECOLOGY | Facility: CLINIC | Age: 28
End: 2023-11-23
Payer: MEDICAID

## 2023-11-26 RX ORDER — CLINDAMYCIN HYDROCHLORIDE 300 MG/1
300 CAPSULE ORAL EVERY 6 HOURS
Qty: 40 CAPSULE | Refills: 0 | Status: SHIPPED | OUTPATIENT
Start: 2023-11-26 | End: 2023-12-06

## 2023-12-05 ENCOUNTER — OFFICE VISIT (OUTPATIENT)
Dept: OBSTETRICS AND GYNECOLOGY | Facility: CLINIC | Age: 28
End: 2023-12-05
Payer: MEDICAID

## 2023-12-05 VITALS
DIASTOLIC BLOOD PRESSURE: 110 MMHG | WEIGHT: 228.38 LBS | BODY MASS INDEX: 35.77 KG/M2 | SYSTOLIC BLOOD PRESSURE: 140 MMHG | HEART RATE: 85 BPM

## 2023-12-05 DIAGNOSIS — Z13.31 POSITIVE DEPRESSION SCREENING: Primary | ICD-10-CM

## 2023-12-05 DIAGNOSIS — I10 HYPERTENSION, UNSPECIFIED TYPE: ICD-10-CM

## 2023-12-05 PROCEDURE — 3080F PR MOST RECENT DIASTOLIC BLOOD PRESSURE >= 90 MM HG: ICD-10-PCS | Mod: CPTII,,, | Performed by: OBSTETRICS & GYNECOLOGY

## 2023-12-05 PROCEDURE — 0503F POSTPARTUM CARE VISIT: CPT | Mod: ,,, | Performed by: OBSTETRICS & GYNECOLOGY

## 2023-12-05 PROCEDURE — 1159F MED LIST DOCD IN RCRD: CPT | Mod: CPTII,,, | Performed by: OBSTETRICS & GYNECOLOGY

## 2023-12-05 PROCEDURE — 3044F PR MOST RECENT HEMOGLOBIN A1C LEVEL <7.0%: ICD-10-PCS | Mod: CPTII,,, | Performed by: OBSTETRICS & GYNECOLOGY

## 2023-12-05 PROCEDURE — 3080F DIAST BP >= 90 MM HG: CPT | Mod: CPTII,,, | Performed by: OBSTETRICS & GYNECOLOGY

## 2023-12-05 PROCEDURE — 3077F PR MOST RECENT SYSTOLIC BLOOD PRESSURE >= 140 MM HG: ICD-10-PCS | Mod: CPTII,,, | Performed by: OBSTETRICS & GYNECOLOGY

## 2023-12-05 PROCEDURE — 3008F PR BODY MASS INDEX (BMI) DOCUMENTED: ICD-10-PCS | Mod: CPTII,,, | Performed by: OBSTETRICS & GYNECOLOGY

## 2023-12-05 PROCEDURE — 3077F SYST BP >= 140 MM HG: CPT | Mod: CPTII,,, | Performed by: OBSTETRICS & GYNECOLOGY

## 2023-12-05 PROCEDURE — 1159F PR MEDICATION LIST DOCUMENTED IN MEDICAL RECORD: ICD-10-PCS | Mod: CPTII,,, | Performed by: OBSTETRICS & GYNECOLOGY

## 2023-12-05 PROCEDURE — 3008F BODY MASS INDEX DOCD: CPT | Mod: CPTII,,, | Performed by: OBSTETRICS & GYNECOLOGY

## 2023-12-05 PROCEDURE — 0503F PR POSTPARTUM CARE VISIT: ICD-10-PCS | Mod: ,,, | Performed by: OBSTETRICS & GYNECOLOGY

## 2023-12-05 PROCEDURE — 3044F HG A1C LEVEL LT 7.0%: CPT | Mod: CPTII,,, | Performed by: OBSTETRICS & GYNECOLOGY

## 2023-12-05 RX ORDER — CLONIDINE HYDROCHLORIDE 0.1 MG/1
0.1 TABLET ORAL
Status: COMPLETED | OUTPATIENT
Start: 2023-12-05 | End: 2023-12-05

## 2023-12-05 RX ORDER — SERTRALINE HYDROCHLORIDE 50 MG/1
100 TABLET, FILM COATED ORAL DAILY
Qty: 30 TABLET | Refills: 2 | Status: SHIPPED | OUTPATIENT
Start: 2023-12-05 | End: 2024-02-15

## 2023-12-05 RX ADMIN — CLONIDINE HYDROCHLORIDE 0.1 MG: 0.1 TABLET ORAL at 01:12

## 2023-12-05 NOTE — PROGRESS NOTES
I have reviewed the positive depression score which warrants active treatment with psychotherapy and/or medications.

## 2023-12-08 RX ORDER — NIFEDIPINE 60 MG/1
60 TABLET, EXTENDED RELEASE ORAL DAILY
Qty: 30 TABLET | Refills: 3 | Status: SHIPPED | OUTPATIENT
Start: 2023-12-08 | End: 2024-12-07

## 2023-12-08 NOTE — PROGRESS NOTES
Postpartum Visit  Nigel Camejo is a 28 y.o. female  is here for a postpartum visit. She is 4 weeks postpartum following a low cervical transverse  section, of a male infant weighinlb 15oz, with Anesthesia: spinal. . The delivery was at 38w 5d.     Pregnancy was complicated by: Prior  x 2.        Postpartum course has been uncomplicated.  Bleeding no bleeding. Bowel/ bladder function is normal. Her last pap was .  Patient is not sexually active. Desired contraception method is  undecided .   Postpartum depression screening: negative.  Baby's course has been uncomplicated. Baby is feeding by bottle - Enfamil AR.     OB History    Para Term  AB Living   3 3 3     3   SAB IAB Ectopic Multiple Live Births         0 1      # Outcome Date GA Lbr Volodymyr/2nd Weight Sex Delivery Anes PTL Lv   3 Term 23 38w5d  3.6 kg (7 lb 15 oz) M CS-LTranv Spinal  HERIBERTO   2 Term            1 Term                  Review of patient's allergies indicates:  No Known Allergies    History reviewed. No pertinent past medical history.  Past Surgical History:   Procedure Laterality Date     SECTION      x2     SECTION N/A 2023    Procedure:  SECTION;  Surgeon: Jim England MD;  Location: [facility] L&D;  Service: OB/GYN;  Laterality: N/A;     OB History          3    Para   3    Term   3            AB        Living   3         SAB        IAB        Ectopic        Multiple   0    Live Births   1               Family History   Problem Relation Age of Onset    Bone cancer Maternal Grandmother     Cancer Maternal Grandmother     Pancreatic cancer Maternal Grandfather     Cancer Maternal Grandfather     Diabetes Father     Hypertension Mother     Heart failure Mother     Diabetes Maternal Aunt     Cancer Maternal Aunt     Kidney failure Maternal Aunt     Diabetes Maternal Aunt     Stroke Maternal Aunt     Diabetes Maternal Aunt      Social History      Tobacco Use    Smoking status: Former     Passive exposure: Never    Smokeless tobacco: Never   Substance Use Topics    Alcohol use: Not Currently    Drug use: Not Currently       Current Outpatient Medications   Medication Sig    ferrous sulfate 325 (65 FE) MG EC tablet Take 1 tablet (325 mg total) by mouth 3 (three) times daily with meals.    PNV,calcium 72-iron-folic acid (PRENATAL VITAMIN PLUS LOW IRON) 27 mg iron- 1 mg Tab Take 1 tablet (1 each total) by mouth once daily.    docusate sodium (COLACE) 100 MG capsule Take 1 capsule (100 mg total) by mouth 2 (two) times daily. (Patient not taking: Reported on 12/5/2023)    HYDROcodone-acetaminophen (NORCO) 7.5-325 mg per tablet Take 1 tablet by mouth every 6 (six) hours as needed for Pain. (Patient not taking: Reported on 12/5/2023)    NIFEdipine (PROCARDIA-XL) 60 MG (OSM) 24 hr tablet Take 1 tablet (60 mg total) by mouth once daily.    ondansetron (ZOFRAN) 4 MG tablet Take 4 mg by mouth every 6 (six) hours as needed.    sertraline (ZOLOFT) 50 MG tablet Take 2 tablets (100 mg total) by mouth once daily.     Current Facility-Administered Medications   Medication    medroxyPROGESTERone (DEPO-PROVERA) injection 150 mg         ROS:  GENERAL: No fever, chills, fatigability.  VULVAR: No pain, no lesions and no itching.  VAGINAL: No relaxation, no itching, no discharge, no abnormal bleeding and no lesions.  ABDOMEN: No abdominal pain. Denies nausea. Denies vomiting. No diarrhea. No constipation  BREAST: Denies pain. No lumps. No discharge.  URINARY: No incontinence, no nocturia, no frequency and no dysuria.  CARDIOVASCULAR: No chest pain. No shortness of breath. No leg cramps.  NEUROLOGICAL: No headaches. No vision changes.      General appearance - alert, well appearing, and in no distress  Mental status - alert, oriented to person, place, and time  Skin - coloration normal for race, good turgor, warm to touch, no rashes  Abdomen - soft, nontender, nondistended, no  masses or organomegaly  Pfannenstiel incision: Clean, dry, intact - healing well.  Pelvic -   External genitalia postpartum: normal, well-healed, without lesions or masses.  Normal female hair distribution. Adequate perineal body. Urethral meatus without lesions or prolapse. Urethra: no masses, tenderness, or scarring.  Bladder: without tenderness or masses.  Vaginal mucosa moist and pink, normal rugae, without lesions, abnormal discharge, or foul odor.  Cervix pink, no lesions, no cervical motion tenderness.  Uterus: midline, non tender, smooth, not enlarged, not prolapsed  No adnexal masses or tenderness.  Extremities - no edema, redness or tenderness in the calves or thighs      Nigel was seen today for post-op evaluation.    Diagnoses and all orders for this visit:    Positive depression screening  Comments:  I have reviewed the positive depression score which warrants active treatment with psychotherapy and/or medications.    Postpartum depression  -     sertraline (ZOLOFT) 50 MG tablet; Take 2 tablets (100 mg total) by mouth once daily.    Hypertension, unspecified type  -     cloNIDine tablet 0.1 mg  -     NIFEdipine (PROCARDIA-XL) 60 MG (OSM) 24 hr tablet; Take 1 tablet (60 mg total) by mouth once daily.        Discussed contraception - pt desires undecided  Counseling regarding resuming normal activities of exercise and work.  Postpartum precautions reviewed    Routine follow up in 1 yr

## 2023-12-14 ENCOUNTER — TELEPHONE (OUTPATIENT)
Dept: OBSTETRICS AND GYNECOLOGY | Facility: CLINIC | Age: 28
End: 2023-12-14
Payer: MEDICAID

## 2023-12-14 NOTE — TELEPHONE ENCOUNTER
----- Message from Candace Mendoza sent at 12/6/2023  3:02 PM CST -----  Pt stated that her insurance wont pay for Zofran, and they are asking if it can be 100 MG. Pt also stated that the pharmacy hasn't received her BP meds      [phone]

## 2023-12-14 NOTE — TELEPHONE ENCOUNTER
Returned call to pt/ Pt states her insurance will not cover her Zoloft due to being prescribed as Zoloft 50 mg take 2 tabs PO daily and need to say Zoloft 100 mg take 1 PO daily. Called pts pharmacy, [facility] [address], to see about Rx. Per verbal order of  Zoloft 50 mg two tablets PO once daily can be changed to Zoloft 100 mg one tablet PO daily/ Rx has been changed/

## 2023-12-14 NOTE — PROCEDURES
OB ultrasound Note:    BPD- 33 weeks 2 day  HC-33 week 2 day  AC- 33 weeks 5 day  FL- 33 week 4 day    KAELYN- 12.18 cm    Fetal heart rate:  145 bpm    Fetal position- vertex  Placental location- posterior    Impression-    JOAN November 4, 2023  Estimated gestational age 33 weeks 3 day  EFW 2223 g (4 lb 14 oz)  Pctl ( EFW) 47 th percentile      Biophysical Profile:    Fetal Movements- 2  Fetal breathing- 2  Fetal Tone- 2  Amniotic Fluid Volume- 2    Total - 8

## 2024-01-11 ENCOUNTER — TELEPHONE (OUTPATIENT)
Dept: OBSTETRICS AND GYNECOLOGY | Facility: CLINIC | Age: 29
End: 2024-01-11
Payer: MEDICAID

## 2024-01-11 NOTE — LETTER
January 11, 2024      [facility] - OB/GYN  [address] [facility] [address]  Phone: [phone]  Fax: [phone]       Patient: Nigel Camejo   YOB: 1995  Date of Visit: 12/05/2023    To Whom It May Concern:    Tamara Camejo  was at [facility] on 12/05/2023. The patient may return to work/school on 12/28/2023 with no restrictions. If you have any questions or concerns, or if I can be of further assistance, please do not hesitate to contact me.    Sincerely,    Barbara Feldman LPN

## 2024-01-11 NOTE — TELEPHONE ENCOUNTER
----- Message from Candace Mendoza sent at 1/8/2024  9:13 AM CST -----  Pt said could she get a letter faxed to her job saying that she could go back with no restrictions.       FAX: [phone]    PH: [phone]

## 2024-01-11 NOTE — TELEPHONE ENCOUNTER
Returned call to pt/ Informed pt that on her last visit on 2023 she was released to all normal activity Pt is allowed 8 weeks after a  for FMLA leave. Informed pt that I can print a letter and have it at the  for her to . Pt states her sister Kinjal villanueva 1997; will pick it up for her on 2024.

## 2024-02-15 ENCOUNTER — HOSPITAL ENCOUNTER (INPATIENT)
Facility: HOSPITAL | Age: 29
LOS: 3 days | Discharge: HOME | End: 2024-02-20
Attending: EMERGENCY MEDICINE | Admitting: OBSTETRICS & GYNECOLOGY
Payer: MEDICAID

## 2024-02-15 DIAGNOSIS — R07.9 CHEST PAIN: ICD-10-CM

## 2024-02-15 DIAGNOSIS — N39.0 URINARY TRACT INFECTION WITHOUT HEMATURIA, SITE UNSPECIFIED: ICD-10-CM

## 2024-02-15 DIAGNOSIS — N70.93 TUBO-OVARIAN ABSCESS: Primary | ICD-10-CM

## 2024-02-15 LAB
AMORPHOUS CRYSTALS, UA (OHS): ABNORMAL /HPF
AMYLASE SERPL-CCNC: 15 U/L (ref 25–115)
ANION GAP SERPL CALCULATED.3IONS-SCNC: 16 MMOL/L (ref 7–16)
ANISOCYTOSIS BLD QL SMEAR: ABNORMAL
B-HCG UR QL: NEGATIVE
BACTERIA #/AREA URNS HPF: ABNORMAL /HPF
BACTERIA VAG QL WET PREP: ABNORMAL /HPF
BASOPHILS # BLD AUTO: 0.1 K/UL (ref 0–0.2)
BASOPHILS NFR BLD AUTO: 0.5 % (ref 0–1)
BILIRUB UR QL STRIP: NEGATIVE
BUN SERPL-MCNC: 15 MG/DL (ref 7–18)
BUN/CREAT SERPL: 14 (ref 6–20)
CALCIUM SERPL-MCNC: 8.5 MG/DL (ref 8.5–10.1)
CHLORIDE SERPL-SCNC: 100 MMOL/L (ref 98–107)
CLARITY UR: ABNORMAL
CLUE CELLS VAG QL WET PREP: ABNORMAL /HPF
CO2 SERPL-SCNC: 22 MMOL/L (ref 21–32)
COLOR UR: YELLOW
CREAT SERPL-MCNC: 1.08 MG/DL (ref 0.55–1.02)
CTP QC/QA: YES
DIFFERENTIAL METHOD BLD: ABNORMAL
EGFR (NO RACE VARIABLE) (RUSH/TITUS): 71 ML/MIN/1.73M2
EOSINOPHIL # BLD AUTO: 0.07 K/UL (ref 0–0.5)
EOSINOPHIL NFR BLD AUTO: 0.3 % (ref 1–4)
ERYTHROCYTE [DISTWIDTH] IN BLOOD BY AUTOMATED COUNT: 17.2 % (ref 11.5–14.5)
GLUCOSE SERPL-MCNC: 74 MG/DL (ref 74–106)
GLUCOSE UR STRIP-MCNC: NORMAL MG/DL
HCT VFR BLD AUTO: 33.3 % (ref 38–47)
HGB BLD-MCNC: 10.9 G/DL (ref 12–16)
HYALINE CASTS #/AREA URNS LPF: ABNORMAL /LPF
HYPOCHROMIA BLD QL SMEAR: ABNORMAL
IMM GRANULOCYTES # BLD AUTO: 0.71 K/UL (ref 0–0.04)
IMM GRANULOCYTES NFR BLD: 3.4 % (ref 0–0.4)
KETONES UR STRIP-SCNC: ABNORMAL MG/DL
LEUKOCYTE ESTERASE UR QL STRIP: ABNORMAL
LIPASE SERPL-CCNC: <19 U/L (ref 16–77)
LYMPHOCYTES # BLD AUTO: 0.49 K/UL (ref 1–4.8)
LYMPHOCYTES NFR BLD AUTO: 2.3 % (ref 27–41)
LYMPHOCYTES NFR BLD MANUAL: 8 % (ref 27–41)
MCH RBC QN AUTO: 24.2 PG (ref 27–31)
MCHC RBC AUTO-ENTMCNC: 32.7 G/DL (ref 32–36)
MCV RBC AUTO: 74 FL (ref 80–96)
MICROCYTES BLD QL SMEAR: ABNORMAL
MONOCYTES # BLD AUTO: 0.4 K/UL (ref 0–0.8)
MONOCYTES NFR BLD AUTO: 1.9 % (ref 2–6)
MPC BLD CALC-MCNC: 11.4 FL (ref 9.4–12.4)
MUCOUS, UA: ABNORMAL /LPF
NEUTROPHILS # BLD AUTO: 19.35 K/UL (ref 1.8–7.7)
NEUTROPHILS NFR BLD AUTO: 91.6 % (ref 53–65)
NEUTS BAND NFR BLD MANUAL: 16 % (ref 1–5)
NEUTS SEG NFR BLD MANUAL: 76 % (ref 50–62)
NITRITE UR QL STRIP: NEGATIVE
NRBC # BLD AUTO: 0 X10E3/UL
NRBC, AUTO (.00): 0 %
PH UR STRIP: 5.5 PH UNITS
PLATELET # BLD AUTO: 201 K/UL (ref 150–400)
PLATELET MORPHOLOGY: NORMAL
POTASSIUM SERPL-SCNC: 4.3 MMOL/L (ref 3.5–5.1)
PROT UR QL STRIP: 70
RBC # BLD AUTO: 4.5 M/UL (ref 4.2–5.4)
RBC # UR STRIP: ABNORMAL /UL
RBC #/AREA URNS HPF: 3 /HPF
RBC #/AREA VAG WET PREP: ABNORMAL /HPF
SODIUM SERPL-SCNC: 134 MMOL/L (ref 136–145)
SP GR UR STRIP: 1.03
SQUAMOUS #/AREA URNS LPF: ABNORMAL /HPF
SQUAMOUS EPITHELIALS WET WOUNT, GENITAL: ABNORMAL /HPF
T VAGINALIS VAG QL WET PREP: ABNORMAL /HPF
UROBILINOGEN UR STRIP-ACNC: NORMAL MG/DL
WBC # BLD AUTO: 21.12 K/UL (ref 4.5–11)
WBC #/AREA URNS HPF: 19 /HPF
WBC CLUMPS WET MOUNT, GENITAL: ABNORMAL /HPF
WBC VAG QL WET PREP: ABNORMAL /HPF
YEAST VAG QL WET PREP: ABNORMAL /HPF

## 2024-02-15 PROCEDURE — 87040 BLOOD CULTURE FOR BACTERIA: CPT | Performed by: EMERGENCY MEDICINE

## 2024-02-15 PROCEDURE — 96374 THER/PROPH/DIAG INJ IV PUSH: CPT | Mod: 59

## 2024-02-15 PROCEDURE — 25500020 PHARM REV CODE 255: Performed by: EMERGENCY MEDICINE

## 2024-02-15 PROCEDURE — 99285 EMERGENCY DEPT VISIT HI MDM: CPT | Mod: 25

## 2024-02-15 PROCEDURE — 81001 URINALYSIS AUTO W/SCOPE: CPT | Performed by: EMERGENCY MEDICINE

## 2024-02-15 PROCEDURE — 63600175 PHARM REV CODE 636 W HCPCS

## 2024-02-15 PROCEDURE — 87210 SMEAR WET MOUNT SALINE/INK: CPT | Performed by: OBSTETRICS & GYNECOLOGY

## 2024-02-15 PROCEDURE — 99285 EMERGENCY DEPT VISIT HI MDM: CPT | Mod: ,,, | Performed by: EMERGENCY MEDICINE

## 2024-02-15 PROCEDURE — 25000003 PHARM REV CODE 250: Performed by: EMERGENCY MEDICINE

## 2024-02-15 PROCEDURE — 81025 URINE PREGNANCY TEST: CPT | Performed by: EMERGENCY MEDICINE

## 2024-02-15 PROCEDURE — 82150 ASSAY OF AMYLASE: CPT | Performed by: OBSTETRICS & GYNECOLOGY

## 2024-02-15 PROCEDURE — 63600175 PHARM REV CODE 636 W HCPCS: Mod: JZ,JG | Performed by: EMERGENCY MEDICINE

## 2024-02-15 PROCEDURE — 87491 CHLMYD TRACH DNA AMP PROBE: CPT | Performed by: EMERGENCY MEDICINE

## 2024-02-15 PROCEDURE — 83690 ASSAY OF LIPASE: CPT | Performed by: OBSTETRICS & GYNECOLOGY

## 2024-02-15 PROCEDURE — 85025 COMPLETE CBC W/AUTO DIFF WBC: CPT | Performed by: EMERGENCY MEDICINE

## 2024-02-15 PROCEDURE — 96361 HYDRATE IV INFUSION ADD-ON: CPT

## 2024-02-15 PROCEDURE — 96375 TX/PRO/DX INJ NEW DRUG ADDON: CPT

## 2024-02-15 PROCEDURE — 87086 URINE CULTURE/COLONY COUNT: CPT | Performed by: EMERGENCY MEDICINE

## 2024-02-15 PROCEDURE — 96365 THER/PROPH/DIAG IV INF INIT: CPT

## 2024-02-15 PROCEDURE — 63600175 PHARM REV CODE 636 W HCPCS: Performed by: EMERGENCY MEDICINE

## 2024-02-15 PROCEDURE — 80048 BASIC METABOLIC PNL TOTAL CA: CPT | Performed by: EMERGENCY MEDICINE

## 2024-02-15 RX ORDER — SERTRALINE HYDROCHLORIDE 100 MG/1
100 TABLET, FILM COATED ORAL DAILY
COMMUNITY
Start: 2024-01-30

## 2024-02-15 RX ORDER — ONDANSETRON HYDROCHLORIDE 2 MG/ML
4 INJECTION, SOLUTION INTRAVENOUS
Status: COMPLETED | OUTPATIENT
Start: 2024-02-15 | End: 2024-02-15

## 2024-02-15 RX ORDER — MORPHINE SULFATE 4 MG/ML
4 INJECTION, SOLUTION INTRAMUSCULAR; INTRAVENOUS
Status: COMPLETED | OUTPATIENT
Start: 2024-02-15 | End: 2024-02-15

## 2024-02-15 RX ORDER — DIPHENHYDRAMINE HYDROCHLORIDE 50 MG/ML
25 INJECTION INTRAMUSCULAR; INTRAVENOUS
Status: COMPLETED | OUTPATIENT
Start: 2024-02-15 | End: 2024-02-15

## 2024-02-15 RX ORDER — HYDROMORPHONE HYDROCHLORIDE 2 MG/ML
1 INJECTION, SOLUTION INTRAMUSCULAR; INTRAVENOUS; SUBCUTANEOUS
Status: COMPLETED | OUTPATIENT
Start: 2024-02-15 | End: 2024-02-15

## 2024-02-15 RX ORDER — DIPHENHYDRAMINE HYDROCHLORIDE 50 MG/ML
INJECTION INTRAMUSCULAR; INTRAVENOUS
Status: COMPLETED
Start: 2024-02-15 | End: 2024-02-15

## 2024-02-15 RX ADMIN — MORPHINE SULFATE 4 MG: 4 INJECTION, SOLUTION INTRAMUSCULAR; INTRAVENOUS at 02:02

## 2024-02-15 RX ADMIN — DIPHENHYDRAMINE HYDROCHLORIDE 25 MG: 50 INJECTION INTRAMUSCULAR; INTRAVENOUS at 06:02

## 2024-02-15 RX ADMIN — IOPAMIDOL 100 ML: 755 INJECTION, SOLUTION INTRAVENOUS at 06:02

## 2024-02-15 RX ADMIN — SODIUM CHLORIDE 1000 ML: 9 INJECTION, SOLUTION INTRAVENOUS at 02:02

## 2024-02-15 RX ADMIN — ONDANSETRON 4 MG: 2 INJECTION INTRAMUSCULAR; INTRAVENOUS at 02:02

## 2024-02-15 RX ADMIN — DEXTROSE MONOHYDRATE 1 G: 5 INJECTION INTRAVENOUS at 07:02

## 2024-02-15 RX ADMIN — HYDROMORPHONE HYDROCHLORIDE 1 MG: 2 INJECTION INTRAMUSCULAR; INTRAVENOUS; SUBCUTANEOUS at 07:02

## 2024-02-15 NOTE — ED PROVIDER NOTES
Encounter Date: 2/15/2024    SCRIBE #1 NOTE: I, Khloe Hood, am scribing for, and in the presence of,  Anatoly Huang MD. I have scribed the entire note.       History     Chief Complaint   Patient presents with    Abdominal Pain    Nausea    Vomiting    Anxiety     States symptoms started last night.      28 y.o. female presents to the ED with c/o abdominal pain that radiates to back, anxiety attacks, and n/v/d. Patient described vomit to be brown and odiferous.     The history is provided by the patient. No  was used.     Review of patient's allergies indicates:  No Known Allergies  Past Medical History:   Diagnosis Date    Hypertension      Past Surgical History:   Procedure Laterality Date     SECTION      x2     SECTION N/A 2023    Procedure:  SECTION;  Surgeon: Jim England MD;  [facility];  Service: OB/GYN;  Laterality: N/A;     Family History   Problem Relation Age of Onset    Bone cancer Maternal Grandmother     Cancer Maternal Grandmother     Pancreatic cancer Maternal Grandfather     Cancer Maternal Grandfather     Diabetes Father     Hypertension Mother     Heart failure Mother     Diabetes Maternal Aunt     Cancer Maternal Aunt     Kidney failure Maternal Aunt     Diabetes Maternal Aunt     Stroke Maternal Aunt     Diabetes Maternal Aunt      Social History     Tobacco Use    Smoking status: Former     Passive exposure: Never    Smokeless tobacco: Never   Substance Use Topics    Alcohol use: Not Currently    Drug use: Not Currently     Review of Systems   Constitutional: Negative.  Negative for fever.   HENT: Negative.     Eyes: Negative.    Respiratory: Negative.     Cardiovascular: Negative.    Gastrointestinal:  Positive for abdominal pain, diarrhea, nausea and vomiting.   Endocrine: Negative.    Genitourinary: Negative.    Musculoskeletal: Negative.    Skin: Negative.    Allergic/Immunologic: Negative.    Neurological:  Negative.    Hematological: Negative.    Psychiatric/Behavioral: Negative.     All other systems reviewed and are negative.      Physical Exam     Initial Vitals [02/15/24 1354]   BP Pulse Resp Temp SpO2   138/81 (!) 112 18 98.6 °F (37 °C) 97 %      MAP       --         Physical Exam    Vitals reviewed.  Constitutional: She appears well-developed and well-nourished. No distress.   HENT:   Head: Normocephalic.   Eyes: Conjunctivae are normal. Pupils are equal, round, and reactive to light.   Cardiovascular:  Normal rate, regular rhythm, normal heart sounds and intact distal pulses.           Pulmonary/Chest: Breath sounds normal.   Abdominal: Abdomen is soft. Bowel sounds are normal. There is abdominal tenderness. There is tenderness at McBurney's point.     Neurological: She is alert and oriented to person, place, and time.   Skin: Skin is warm and dry.   Psychiatric: She has a normal mood and affect.         ED Course   Procedures  Labs Reviewed   WET PREP, GENITAL - Abnormal; Notable for the following components:       Result Value    WBC Wet [place] Few (*)     RBC Wet [place] Rare (*)     Bacteria Wet Mount Moderate (*)     Clue Cell Wet [place] Few (*)     Squamous Epithelials Wet [place] Moderate (*)     All other components within normal limits   BASIC METABOLIC PANEL - Abnormal; Notable for the following components:    Sodium 134 (*)     Creatinine 1.08 (*)     All other components within normal limits   URINALYSIS, REFLEX TO URINE CULTURE - Abnormal; Notable for the following components:    Leukocytes, UA Moderate (*)     Protein, UA 70 (*)     Blood, UA Small (*)     All other components within normal limits   CBC WITH DIFFERENTIAL - Abnormal; Notable for the following components:    WBC 21.12 (*)     Hemoglobin 10.9 (*)     Hematocrit 33.3 (*)     MCV 74.0 (*)     MCH 24.2 (*)     RDW 17.2 (*)     Neutrophils % 91.6 (*)     Lymphocytes % 2.3 (*)     Monocytes % 1.9 (*)     Eosinophils % 0.3 (*)     Immature  Granulocytes % 3.4 (*)     Neutrophils, Abs 19.35 (*)     Lymphocytes, Absolute 0.49 (*)     Immature Granulocytes, Absolute 0.71 (*)     All other components within normal limits   MANUAL DIFFERENTIAL - Abnormal; Notable for the following components:    Segmented Neutrophils, Man % 76 (*)     Bands, Man % 16 (*)     Lymphocytes, Man % 8 (*)     All other components within normal limits   URINALYSIS, MICROSCOPIC - Abnormal; Notable for the following components:    WBC, UA 19 (*)     Bacteria, UA Occasional (*)     Squamous Epithelial Cells, UA Occasional (*)     Hyaline Casts, UA 2-5 (*)     Amorphous Crystals, UA Occ (*)     Mucous Many (*)     All other components within normal limits   AMYLASE - Abnormal; Notable for the following components:    Amylase 15 (*)     All other components within normal limits   LIPASE - Normal   POCT URINE PREGNANCY - Normal   CULTURE, URINE   CULTURE, BLOOD   CULTURE, BLOOD   CHLAMYDIA/GONORRHOEAE(GC), PCR   CBC W/ AUTO DIFFERENTIAL    Narrative:     The following orders were created for panel order CBC auto differential.  Procedure                               Abnormality         Status                     ---------                               -----------         ------                     CBC with Differential[[id number]]       Abnormal            Final result               Manual Differential[[id number]]         Abnormal            Final result                 Please view results for these tests on the individual orders.   URINALYSIS, REFLEX TO URINE CULTURE          Imaging Results              US Pelvis Comp with Transvag NON-OB (xpd (In process)                      CT Abdomen Pelvis With IV Contrast NO Oral Contrast (Final result)  Result time 02/15/24 18:42:06      Final result by Rickie Ferrari II, MD (02/15/24 18:42:06)                   Impression:      Moderate fluid in the pelvis with stranding and suggestion of cystic areas could indicate ovarian cyst or tubal  infection.  Appendix is not definitely seen.      Electronically signed by: Rickie Wisheyla  Date:    02/15/2024  Time:    18:42               Narrative:    EXAMINATION:  CT ABDOMEN PELVIS WITH IV CONTRAST    CLINICAL HISTORY:  RLQ abdominal pain (Age >= 14y);    TECHNIQUE:  Axial CT imaging of the abdomen and pelvis is performed with intravenous contrast. Contrast dose is 100 cc Isovue 370.    CT dose reduction technique used - Dose Rite and tube current modulation.    COMPARISON:  None available    FINDINGS:  Cardiac and lung bases are within normal limits    CT abdomen: The liver, spleen, pancreas and adrenal glands are normal in size and enhancement.  No evidence of focal lesion is demonstrated in these solid organs.    Kidneys are normal in size and enhancement.  No evidence of hydronephrosis or nephrolithiasis is seen.    Small amount of fluid seen in the right lower quadrant.  The appendix is not identified.  The bowel caliber is normal and no wall thickening or adjacent inflammatory change is seen.  No evidence of free air is present.      CT pelvis: The pelvic bowel appears within normal limits.  Bladder shows no evidence of abnormality.  There is stranding with moderate fluid in the pelvis.  There is suggestion of some cystic areas present in the pelvis more prominent on the left side.                                       Medications   sodium chloride 0.9% bolus 1,000 mL 1,000 mL (0 mLs Intravenous Stopped 2/15/24 1800)   morphine injection 4 mg (4 mg Intravenous Given 2/15/24 1445)   ondansetron injection 4 mg (4 mg Intravenous Given 2/15/24 1446)   diphenhydrAMINE injection 25 mg (25 mg Intravenous Given 2/15/24 1820)   cefTRIAXone (Rocephin) 1 g in dextrose 5 % in water (D5W) 100 mL IVPB (MB+) (0 g Intravenous Stopped 2/15/24 2009)   iopamidoL (ISOVUE-370) injection 100 mL (100 mLs Intravenous Given 2/15/24 1830)   HYDROmorphone (PF) injection 1 mg (1 mg Intravenous Given 2/15/24 1938)     Medical  Decision Making  Amount and/or Complexity of Data Reviewed  Labs: ordered.  Radiology: ordered.    Risk  Prescription drug management.              Attending Attestation:           Physician Attestation for Scribe:  Physician Attestation Statement for Scribe #1: I, Anatoly Huang MD, reviewed documentation, as scribed by Khloe Hood in my presence, and it is both accurate and complete.             ED Course as of 02/15/24 2209   Thu Feb 15, 2024   1818 Medical decision-making:  Differential diagnosis includes abdominal pain, UTI, pyelonephritis, appendicitis.  All labs and imaging ordered and interpreted by me. [BB]   1819 CBC [place] elevated [place] [address].  Anemia with hematocrit of 33.  Anemia is around baseline when compared to review of outside medical record.  Urinalysis shows UTI with 19 white cells, occasional bacteria.  CMP is unremarkable except mildly elevated creatinine [BB]   1841 On my exam patient does have significant right lower quadrant tenderness.  Patient states that she had vaginal bleeding for last 3 weeks and stopped yesterday.  Patient denies any current vaginal bleeding or any vaginal discharge. [BB]   1908 CT abdomen by my interpretation shows no evidence of appendicitis, possible tubo-ovarian abscess.  Radiology interpretation of CT agrees with this. [BB]   1912 I discussed case with OB hospitalist who agrees patient needs admitted.  He is going to come see patient in the emergency department. [BB]   1929 Ob requested a pelvic ultrasound which we will obtain. [BB]      ED Course User Index  [BB] Montana Ren MD                           Clinical Impression:  Final diagnoses:  [N70.93] Tubo-ovarian abscess (Primary)  [N39.0] Urinary tract infection without hematuria, site unspecified                 Montana Ren MD  02/15/24 2209

## 2024-02-16 PROBLEM — O34.219 PREVIOUS CESAREAN SECTION COMPLICATING PREGNANCY: Status: RESOLVED | Noted: 2023-11-02 | Resolved: 2024-02-16

## 2024-02-16 PROBLEM — N70.93 TUBO-OVARIAN ABSCESS: Status: ACTIVE | Noted: 2024-02-16

## 2024-02-16 PROBLEM — Z3A.38 38 WEEKS GESTATION OF PREGNANCY: Status: RESOLVED | Noted: 2023-11-02 | Resolved: 2024-02-16

## 2024-02-16 PROBLEM — R10.30 LOWER ABDOMINAL PAIN: Status: ACTIVE | Noted: 2024-02-16

## 2024-02-16 LAB
ALBUMIN SERPL BCP-MCNC: 2.7 G/DL (ref 3.5–5)
ALBUMIN SERPL BCP-MCNC: 2.8 G/DL (ref 3.5–5)
ALBUMIN/GLOB SERPL: 0.7 {RATIO}
ALBUMIN/GLOB SERPL: 0.8 {RATIO}
ALP SERPL-CCNC: 100 U/L (ref 37–98)
ALP SERPL-CCNC: 94 U/L (ref 37–98)
ALT SERPL W P-5'-P-CCNC: 18 U/L (ref 13–56)
ALT SERPL W P-5'-P-CCNC: 22 U/L (ref 13–56)
ANION GAP SERPL CALCULATED.3IONS-SCNC: 8 MMOL/L (ref 7–16)
ANION GAP SERPL CALCULATED.3IONS-SCNC: 8 MMOL/L (ref 7–16)
ANISOCYTOSIS BLD QL SMEAR: ABNORMAL
ANISOCYTOSIS BLD QL SMEAR: ABNORMAL
AST SERPL W P-5'-P-CCNC: 18 U/L (ref 15–37)
AST SERPL W P-5'-P-CCNC: 39 U/L (ref 15–37)
BASOPHILS # BLD AUTO: 0.1 K/UL (ref 0–0.2)
BASOPHILS # BLD AUTO: 0.12 K/UL (ref 0–0.2)
BASOPHILS NFR BLD AUTO: 0.6 % (ref 0–1)
BASOPHILS NFR BLD AUTO: 0.6 % (ref 0–1)
BILIRUB SERPL-MCNC: 1.2 MG/DL (ref ?–1.2)
BILIRUB SERPL-MCNC: 1.5 MG/DL (ref ?–1.2)
BILIRUB UR QL STRIP: NEGATIVE
BUN SERPL-MCNC: 24 MG/DL (ref 7–18)
BUN SERPL-MCNC: 25 MG/DL (ref 7–18)
BUN/CREAT SERPL: 12 (ref 6–20)
BUN/CREAT SERPL: 16 (ref 6–20)
CALCIUM SERPL-MCNC: 8.1 MG/DL (ref 8.5–10.1)
CALCIUM SERPL-MCNC: 8.2 MG/DL (ref 8.5–10.1)
CHLAMYDIA BY PCR: NEGATIVE
CHLORIDE SERPL-SCNC: 107 MMOL/L (ref 98–107)
CHLORIDE SERPL-SCNC: 108 MMOL/L (ref 98–107)
CLARITY UR: CLEAR
CO2 SERPL-SCNC: 22 MMOL/L (ref 21–32)
CO2 SERPL-SCNC: 24 MMOL/L (ref 21–32)
COLOR UR: COLORLESS
CREAT SERPL-MCNC: 1.61 MG/DL (ref 0.55–1.02)
CREAT SERPL-MCNC: 2.06 MG/DL (ref 0.55–1.02)
DIFFERENTIAL METHOD BLD: ABNORMAL
DIFFERENTIAL METHOD BLD: ABNORMAL
EGFR (NO RACE VARIABLE) (RUSH/TITUS): 33 ML/MIN/1.73M2
EGFR (NO RACE VARIABLE) (RUSH/TITUS): 44 ML/MIN/1.73M2
EOSINOPHIL # BLD AUTO: 0.09 K/UL (ref 0–0.5)
EOSINOPHIL # BLD AUTO: 0.38 K/UL (ref 0–0.5)
EOSINOPHIL NFR BLD AUTO: 0.5 % (ref 1–4)
EOSINOPHIL NFR BLD AUTO: 1.8 % (ref 1–4)
EOSINOPHIL NFR BLD MANUAL: 1 % (ref 1–4)
EOSINOPHIL NFR BLD MANUAL: 1 % (ref 1–4)
ERYTHROCYTE [DISTWIDTH] IN BLOOD BY AUTOMATED COUNT: 17.2 % (ref 11.5–14.5)
ERYTHROCYTE [DISTWIDTH] IN BLOOD BY AUTOMATED COUNT: 17.2 % (ref 11.5–14.5)
GLOBULIN SER-MCNC: 3.7 G/DL (ref 2–4)
GLOBULIN SER-MCNC: 4.1 G/DL (ref 2–4)
GLUCOSE SERPL-MCNC: 86 MG/DL (ref 74–106)
GLUCOSE SERPL-MCNC: 94 MG/DL (ref 74–106)
GLUCOSE UR STRIP-MCNC: NORMAL MG/DL
HCT VFR BLD AUTO: 30.6 % (ref 38–47)
HCT VFR BLD AUTO: 31.3 % (ref 38–47)
HGB BLD-MCNC: 10 G/DL (ref 12–16)
HGB BLD-MCNC: 9.9 G/DL (ref 12–16)
HYPOCHROMIA BLD QL SMEAR: ABNORMAL
IMM GRANULOCYTES # BLD AUTO: 0.16 K/UL (ref 0–0.04)
IMM GRANULOCYTES # BLD AUTO: 0.78 K/UL (ref 0–0.04)
IMM GRANULOCYTES NFR BLD: 0.8 % (ref 0–0.4)
IMM GRANULOCYTES NFR BLD: 4.7 % (ref 0–0.4)
KETONES UR STRIP-SCNC: NEGATIVE MG/DL
LEUKOCYTE ESTERASE UR QL STRIP: NEGATIVE
LYMPHOCYTES # BLD AUTO: 0.44 K/UL (ref 1–4.8)
LYMPHOCYTES # BLD AUTO: 0.96 K/UL (ref 1–4.8)
LYMPHOCYTES NFR BLD AUTO: 2.7 % (ref 27–41)
LYMPHOCYTES NFR BLD AUTO: 4.7 % (ref 27–41)
LYMPHOCYTES NFR BLD MANUAL: 1 % (ref 27–41)
LYMPHOCYTES NFR BLD MANUAL: 5 % (ref 27–41)
MCH RBC QN AUTO: 23.8 PG (ref 27–31)
MCH RBC QN AUTO: 23.9 PG (ref 27–31)
MCHC RBC AUTO-ENTMCNC: 31.9 G/DL (ref 32–36)
MCHC RBC AUTO-ENTMCNC: 32.4 G/DL (ref 32–36)
MCV RBC AUTO: 73.9 FL (ref 80–96)
MCV RBC AUTO: 74.5 FL (ref 80–96)
METAMYELOCYTES NFR BLD MANUAL: 1 %
MICROCYTES BLD QL SMEAR: ABNORMAL
MICROCYTES BLD QL SMEAR: ABNORMAL
MONOCYTES # BLD AUTO: 0.39 K/UL (ref 0–0.8)
MONOCYTES # BLD AUTO: 0.63 K/UL (ref 0–0.8)
MONOCYTES NFR BLD AUTO: 2.3 % (ref 2–6)
MONOCYTES NFR BLD AUTO: 3.1 % (ref 2–6)
MONOCYTES NFR BLD MANUAL: 2 % (ref 2–6)
MONOCYTES NFR BLD MANUAL: 6 % (ref 2–6)
MPC BLD CALC-MCNC: 11.3 FL (ref 9.4–12.4)
MPC BLD CALC-MCNC: 12.3 FL (ref 9.4–12.4)
N. GONORRHOEAE (GC) BY PCR: NEGATIVE
NEUTROPHILS # BLD AUTO: 14.8 K/UL (ref 1.8–7.7)
NEUTROPHILS # BLD AUTO: 18.32 K/UL (ref 1.8–7.7)
NEUTROPHILS NFR BLD AUTO: 89 % (ref 53–65)
NEUTROPHILS NFR BLD AUTO: 89.2 % (ref 53–65)
NEUTS BAND NFR BLD MANUAL: 18 % (ref 1–5)
NEUTS BAND NFR BLD MANUAL: 33 % (ref 1–5)
NEUTS SEG NFR BLD MANUAL: 58 % (ref 50–62)
NEUTS SEG NFR BLD MANUAL: 74 % (ref 50–62)
NITRITE UR QL STRIP: NEGATIVE
NRBC # BLD AUTO: 0 X10E3/UL
NRBC # BLD AUTO: 0 X10E3/UL
NRBC, AUTO (.00): 0 %
NRBC, AUTO (.00): 0 %
PH UR STRIP: 5.5 PH UNITS
PLATELET # BLD AUTO: 190 K/UL (ref 150–400)
PLATELET # BLD AUTO: 209 K/UL (ref 150–400)
PLATELET MORPHOLOGY: ABNORMAL
PLATELET MORPHOLOGY: ABNORMAL
POTASSIUM SERPL-SCNC: 3.3 MMOL/L (ref 3.5–5.1)
POTASSIUM SERPL-SCNC: 3.9 MMOL/L (ref 3.5–5.1)
PROT SERPL-MCNC: 6.5 G/DL (ref 6.4–8.2)
PROT SERPL-MCNC: 6.8 G/DL (ref 6.4–8.2)
PROT UR QL STRIP: 20
RBC # BLD AUTO: 4.14 M/UL (ref 4.2–5.4)
RBC # BLD AUTO: 4.2 M/UL (ref 4.2–5.4)
RBC # UR STRIP: NEGATIVE /UL
SODIUM SERPL-SCNC: 134 MMOL/L (ref 136–145)
SODIUM SERPL-SCNC: 136 MMOL/L (ref 136–145)
SP GR UR STRIP: >1.05
UROBILINOGEN UR STRIP-ACNC: NORMAL MG/DL
WBC # BLD AUTO: 16.6 K/UL (ref 4.5–11)
WBC # BLD AUTO: 20.57 K/UL (ref 4.5–11)
WBC TOXIC VACUOLES BLD QL SMEAR: ABNORMAL

## 2024-02-16 PROCEDURE — 85025 COMPLETE CBC W/AUTO DIFF WBC: CPT | Mod: 91 | Performed by: OBSTETRICS & GYNECOLOGY

## 2024-02-16 PROCEDURE — 80053 COMPREHEN METABOLIC PANEL: CPT | Performed by: OBSTETRICS & GYNECOLOGY

## 2024-02-16 PROCEDURE — 85025 COMPLETE CBC W/AUTO DIFF WBC: CPT | Performed by: OBSTETRICS & GYNECOLOGY

## 2024-02-16 PROCEDURE — G0378 HOSPITAL OBSERVATION PER HR: HCPCS

## 2024-02-16 PROCEDURE — 80053 COMPREHEN METABOLIC PANEL: CPT | Mod: 91 | Performed by: OBSTETRICS & GYNECOLOGY

## 2024-02-16 PROCEDURE — 63600175 PHARM REV CODE 636 W HCPCS: Performed by: OBSTETRICS & GYNECOLOGY

## 2024-02-16 PROCEDURE — 96367 TX/PROPH/DG ADDL SEQ IV INF: CPT

## 2024-02-16 PROCEDURE — 25000003 PHARM REV CODE 250: Performed by: OBSTETRICS & GYNECOLOGY

## 2024-02-16 RX ORDER — IBUPROFEN 600 MG/1
600 TABLET ORAL EVERY 6 HOURS PRN
Status: DISCONTINUED | OUTPATIENT
Start: 2024-02-16 | End: 2024-02-19

## 2024-02-16 RX ORDER — HYDROCODONE BITARTRATE AND ACETAMINOPHEN 5; 325 MG/1; MG/1
1 TABLET ORAL EVERY 4 HOURS PRN
Status: DISCONTINUED | OUTPATIENT
Start: 2024-02-16 | End: 2024-02-20 | Stop reason: HOSPADM

## 2024-02-16 RX ORDER — HYDROCODONE BITARTRATE AND ACETAMINOPHEN 10; 325 MG/1; MG/1
1 TABLET ORAL EVERY 4 HOURS PRN
Status: DISCONTINUED | OUTPATIENT
Start: 2024-02-16 | End: 2024-02-19

## 2024-02-16 RX ORDER — SODIUM CHLORIDE 0.9 % (FLUSH) 0.9 %
10 SYRINGE (ML) INJECTION
Status: DISCONTINUED | OUTPATIENT
Start: 2024-02-16 | End: 2024-02-20 | Stop reason: HOSPADM

## 2024-02-16 RX ORDER — DOXYCYCLINE HYCLATE 100 MG
100 TABLET ORAL EVERY 12 HOURS
Status: DISCONTINUED | OUTPATIENT
Start: 2024-02-16 | End: 2024-02-17

## 2024-02-16 RX ORDER — ONDANSETRON 4 MG/1
8 TABLET, ORALLY DISINTEGRATING ORAL EVERY 8 HOURS PRN
Status: DISCONTINUED | OUTPATIENT
Start: 2024-02-16 | End: 2024-02-20 | Stop reason: HOSPADM

## 2024-02-16 RX ORDER — SERTRALINE HYDROCHLORIDE 50 MG/1
100 TABLET, FILM COATED ORAL NIGHTLY
Status: DISCONTINUED | OUTPATIENT
Start: 2024-02-16 | End: 2024-02-20 | Stop reason: HOSPADM

## 2024-02-16 RX ADMIN — HYDROCODONE BITARTRATE AND ACETAMINOPHEN 1 TABLET: 10; 325 TABLET ORAL at 04:02

## 2024-02-16 RX ADMIN — DOXYCYCLINE HYCLATE 100 MG: 100 TABLET, COATED ORAL at 08:02

## 2024-02-16 RX ADMIN — DOXYCYCLINE HYCLATE 100 MG: 100 TABLET, COATED ORAL at 09:02

## 2024-02-16 RX ADMIN — DEXTROSE MONOHYDRATE 2 G: 5 INJECTION INTRAVENOUS at 08:02

## 2024-02-16 RX ADMIN — DEXTROSE MONOHYDRATE 2 G: 5 INJECTION INTRAVENOUS at 09:02

## 2024-02-16 RX ADMIN — SERTRALINE HYDROCHLORIDE 100 MG: 50 TABLET ORAL at 08:02

## 2024-02-16 RX ADMIN — DEXTROSE MONOHYDRATE 2 G: 5 INJECTION INTRAVENOUS at 01:02

## 2024-02-16 NOTE — ASSESSMENT & PLAN NOTE
Patient presented with right lower quadrant abdominal pain, nausea vomiting and diarrhea, S he has a white count of 21.13k but remains afebrile.  She is being placed in observation for antiemetic and pain medication as well as IV antibiotics

## 2024-02-16 NOTE — HPI
Patient is a 28-year-old  3 para 3 who presented to the emergency department with complaints of nausea vomiting diarrhea and abdominal pain that all started suddenly yesterday.  She is status post her 3rd  section 3 months ago and did well with the postop recovery.  Having last seen Dr. Hair for her 4 week postop on 2024.  At that time she received Depo-Provera which she reportedly tolerated well.  She did just stop bleeding after having a 3 week long period however.   She states that she experience nausea vomiting and diarrhea yesterday and it was only after having several episodes of vomiting did she start experiencing right lower quadrant pain.  She denied having any fever whatsoever and was afebrile when she arrived and during her time in the emergency department.  She continues to report right lower quadrant pain but her nausea has subsided somewhat.  In the emergency department a CBC was obtained which  revealed a white count of 21,000. A CT scan of the pelvis suggested possible cystic structures while a transvaginal ultrasound revealed bilateral ovarian cysts with complex fluid noted in the posterior and anterior cul-de-sacs.    Gyn was consulted for admission for treatment of a possible tubo-ovarian abscess.

## 2024-02-16 NOTE — ED NOTES
CALLED TO CT AT THIS TIME, PATIENT STARTED ITCHING ALL OVER AFTER INITIAL CONTRAST. IV BENADRYL TO BE GIVEN. WILL NOTIFY PROVIDER

## 2024-02-16 NOTE — H&P
[facility] - Emergency Department  Obstetrics & Gynecology  History & Physical    Patient Name: Nigel Camejo  MRN: [id number]  Admission Date: 2/15/2024  Primary Care Provider: Deysi, Primary Doctor    Subjective:     Chief Complaint/Reason for Admission:  Abdominal pain    History of Present Illness:  Patient is a 28-year-old  3 para 3 who presented to the emergency department with complaints of nausea vomiting diarrhea and abdominal pain that all started suddenly yesterday.  She is status post her 3rd  section 3 months ago and did well with the postop recovery.  Having last seen Dr. Hair for her 4 week postop on 2024.  At that time she received Depo-Provera which she reportedly tolerated well.  She did just stop bleeding after having a 3 week long period however.   She states that she experience nausea vomiting and diarrhea yesterday and it was only after having several episodes of vomiting did she start experiencing right lower quadrant pain.  She denied having any fever whatsoever and was afebrile when she arrived and during her time in the emergency department.  She continues to report right lower quadrant pain but her nausea has subsided somewhat.  In the emergency department a CBC was obtained which  revealed a white count of 21,000. A CT scan of the pelvis suggested possible cystic structures while a transvaginal ultrasound revealed bilateral ovarian cysts with complex fluid noted in the posterior and anterior cul-de-sacs.    Gyn was consulted for admission for treatment of a possible tubo-ovarian abscess.        OB History    Para Term  AB Living   3 3 3 0 0 3   SAB IAB Ectopic Multiple Live Births   0 0 0 0 1      # Outcome Date GA Lbr Volodymyr/2nd Weight Sex Delivery Anes PTL Lv   3 Term 23 38w5d  3.6 kg (7 lb 15 oz) M CS-LTranv Spinal  HERIBERTO      Name: CAROL ANN,JULIO C PEREZ      Apgar1: 8  Apgar5: 9   2 Term            1 Term              Past Medical  History:   Diagnosis Date    Hypertension      Past Surgical History:   Procedure Laterality Date     SECTION      x2     SECTION N/A 2023    Procedure:  SECTION;  Surgeon: Jim England MD;  Location: [facility] L&D;  Service: OB/GYN;  Laterality: N/A;       (Not in a hospital admission)      Review of patient's allergies indicates:  No Known Allergies     Family History       Problem Relation (Age of Onset)    Bone cancer Maternal Grandmother    Cancer Maternal Grandmother, Maternal Grandfather, Maternal Aunt    Diabetes Father, Maternal Aunt, Maternal Aunt, Maternal Aunt    Heart failure Mother    Hypertension Mother    Kidney failure Maternal Aunt    Pancreatic cancer Maternal Grandfather    Stroke Maternal Aunt          Tobacco Use    Smoking status: Former     Passive exposure: Never    Smokeless tobacco: Never   Substance and Sexual Activity    Alcohol use: Not Currently    Drug use: Not Currently    Sexual activity: Yes     Partners: Male     Birth control/protection: None     Review of Systems   Constitutional:  Negative for appetite change, chills, fatigue and fever.   HENT:  Negative for nasal congestion.    Eyes:  Negative for visual disturbance.   Respiratory:  Negative for cough and wheezing.    Cardiovascular:  Negative for chest pain, palpitations and leg swelling.   Gastrointestinal:  Positive for abdominal pain, diarrhea, nausea and vomiting. Negative for bloating and constipation.   Genitourinary:  Positive for menorrhagia and pelvic pain. Negative for dyspareunia, flank pain, frequency, hematuria, urgency and vaginal pain.   Musculoskeletal:  Negative for back pain, leg pain and myalgias.   Neurological:  Negative for headaches.   Hematological:  Negative for adenopathy.   Psychiatric/Behavioral:  Negative for depression and sleep disturbance. The patient is nervous/anxious.       Objective:     Vital Signs (Most Recent):  Temp: 99.7 °F (37.6 °C) (02/15/24  2354)  Pulse: 107 (02/15/24 2354)  Resp: 19 (02/15/24 2354)  BP: (!) 96/58 (02/15/24 2354)  SpO2: 96 % (02/15/24 2354) Vital Signs (24h Range):  Temp:  [98.6 °F (37 °C)-99.7 °F (37.6 °C)] 99.7 °F (37.6 °C)  Pulse:  [107-117] 107  Resp:  [18-20] 19  SpO2:  [96 %-100 %] 96 %  BP: ()/(58-81) 96/58     Weight: 108.9 kg (240 lb)  Body mass index is 37.59 kg/m².    No LMP recorded. (Menstrual status: Birth Control).     Physical Exam:   Constitutional: She is oriented to person, place, and time. She appears well-developed and well-nourished. She appears distressed.    HENT:   Head: Normocephalic and atraumatic.   Nose: No epistaxis.    Eyes: Pupils are equal, round, and reactive to light.    Neck: No thyromegaly present.    Cardiovascular:  Normal rate and regular rhythm.             Pulmonary/Chest: Effort normal. No respiratory distress.        Abdominal: Soft. There is abdominal tenderness. There is no rebound and no guarding.     Genitourinary:    Urethra and uterus normal.      Pelvic exam was performed with patient in the lithotomy position.   The external female genitalia was normal.   The external female genitalia was abnormal.   Cervix is normal. Right adnexum displays tenderness. Left adnexum displays tenderness. There is vaginal discharge in the vagina. No tenderness in the vagina.    No signs of injury in the vagina.   Cervix exhibits motion tenderness.    pap smear not completedUterus is not fixed and not tender. Normal urethral meatus.          Musculoskeletal: Moves all extremeties. No tenderness or edema.       Neurological: She is alert and oriented to person, place, and time.    Skin: Skin is warm and dry. She is not diaphoretic.    Psychiatric: She has a normal mood and affect. Her behavior is normal.        Laboratory:  BMP:   Recent Labs   Lab 02/15/24  1538   GLU 74   *   K 4.3      CO2 22   BUN 15   CREATININE 1.08*   CALCIUM 8.5     CBC:   Recent Labs   Lab 02/15/24  1538   WBC  "21.12*   RBC 4.50   HGB 10.9*   HCT 33.3*      MCV 74.0*   MCH 24.2*   MCHC 32.7     CMP:   Recent Labs   Lab 02/15/24  1538   GLU 74   CALCIUM 8.5   *   K 4.3   CO2 22      BUN 15   CREATININE 1.08*     Urine Pregnancy Test: No results for input(s): "PREGTESTUR" in the last 48 hours.  Recent Labs   Lab 02/15/24  1608   COLORU Yellow   SPECGRAV 1.028   PHUR 5.5   PROTEINUA 70*   BACTERIA Occasional*   NITRITE Negative   LEUKOCYTESUR Moderate*   UROBILINOGEN Normal   HYALINECASTS 2-5*     Recent Lab Results         02/15/24  2115   02/15/24  1926   02/15/24  1608   02/15/24  1538        Amorphous Crystals, UA     Occ         Amylase Level       15       Anion Gap       16       Aniso       1+       Appearance, UA     Turbid         Bacteria - Vaginal Screen Moderate             Bacteria, UA     Occasional         Bands       16       Baso #       0.10       Basophil %       0.5       Bilirubin (UA)     Negative         BUN       15       BUN/CREAT RATIO       14       Calcium       8.5       Chloride       100       Clue Cells, Wet Prep Few             CO2       22       Color, UA     Yellow         Creatinine       1.08       Differential Method       Manual       eGFR       71       Eos #       0.07       Eos %       0.3       Glucose       74       Glucose, UA     Normal         Hematocrit       33.3       Hemoglobin       10.9       Hyaline Casts, UA     2-5         Hypo       1+       Immature Grans (Abs)       0.71       Immature Granulocytes       3.4       Ketones, UA     Trace         Leukocyte Esterase, UA     Moderate         Lipase       <19       Lymph #       0.49       Lymph %       2.3              8       MCH       24.2       MCHC       32.7       MCV       74.0       Microcytosis       2+       Mono #       0.40       Mono %       1.9       MPV       11.4       Mucous     Many         Neutrophils, Abs       19.35       Neutrophils Relative       91.6       NITRITE UA     " Negative         nRBC       0.0       NUCLEATED RBC ABSOLUTE       0.00       Blood, UA     Small         pH, UA     5.5         PLATELET MORPHOLOGY       Normal       Platelet Count       201       Potassium       4.3       hCG Qualitative, Urine   Negative           Protein, UA     70          Acceptable   Yes           RBC       4.50       RBC Wet [place]             RBC, UA     3         RDW       17.2       Segmented Neutrophils, Man %       76       Sodium       134       Specific [place], UA     1.028         Squamous Epithelial Cells, UA     Occasional         Squamous Epithelials Wet [place] Moderate             Trichomonas Wet [place] None Seen             UROBILINOGEN UA     Normal         WBC - Vaginal Screen Few             WBC Clumps Wet [place] Seen             WBC, UA     19         WBC       21.12       Yeast, Wet Prep None Seen                   I have personallly reviewed all pertinent lab results from the last 24 hours.    Diagnostic Results:  US: Reviewed  CT: Reviewed     Assessment/Plan:     Renal/  * Tubo-ovarian abscess  Patient presented with right lower quadrant abdominal pain, nausea vomiting and diarrhea, S he has a white count of 21.13k but remains afebrile.  She is being placed in observation for antiemetic and pain medication as well as IV antibiotics     GI  Lower abdominal pain  Patient's primary complaint was lower abdominal pain that began after several episodes of vomiting and diarrhea yesterday..  Bilateral ovarian cysts and complex free fluid was noted in the anterior and posterior cul-de-sacs on pelvic ultrasound.  Although she is afebrile, there was concern for a tubo-ovarian abscess she has an elevated white count as well.     .        Madan Granados MD  Obstetrics & Gynecology  [facility] - Emergency Department

## 2024-02-16 NOTE — SUBJECTIVE & OBJECTIVE
OB History    Para Term  AB Living   3 3 3 0 0 3   SAB IAB Ectopic Multiple Live Births   0 0 0 0 1      # Outcome Date GA Lbr Volodymyr/2nd Weight Sex Delivery Anes PTL Lv   3 Term 23 38w5d  3.6 kg (7 lb 15 oz) M CS-LTranv Spinal  HERIBERTO      Name: JULIO C MAHARAJ      Apgar1: 8  Apgar5: 9   2 Term            1 Term              Past Medical History:   Diagnosis Date    Hypertension      Past Surgical History:   Procedure Laterality Date     SECTION      x2     SECTION N/A 2023    Procedure:  SECTION;  Surgeon: Jim England MD;  Location: [facility] L&D;  Service: OB/GYN;  Laterality: N/A;       (Not in a hospital admission)      Review of patient's allergies indicates:  No Known Allergies     Family History       Problem Relation (Age of Onset)    Bone cancer Maternal Grandmother    Cancer Maternal Grandmother, Maternal Grandfather, Maternal Aunt    Diabetes Father, Maternal Aunt, Maternal Aunt, Maternal Aunt    Heart failure Mother    Hypertension Mother    Kidney failure Maternal Aunt    Pancreatic cancer Maternal Grandfather    Stroke Maternal Aunt          Tobacco Use    Smoking status: Former     Passive exposure: Never    Smokeless tobacco: Never   Substance and Sexual Activity    Alcohol use: Not Currently    Drug use: Not Currently    Sexual activity: Yes     Partners: Male     Birth control/protection: None     Review of Systems   Constitutional:  Negative for appetite change, chills, fatigue and fever.   HENT:  Negative for nasal congestion.    Eyes:  Negative for visual disturbance.   Respiratory:  Negative for cough and wheezing.    Cardiovascular:  Negative for chest pain, palpitations and leg swelling.   Gastrointestinal:  Positive for abdominal pain, diarrhea, nausea and vomiting. Negative for bloating and constipation.   Genitourinary:  Positive for menorrhagia and pelvic pain. Negative for dyspareunia, flank pain, frequency, hematuria,  urgency and vaginal pain.   Musculoskeletal:  Negative for back pain, leg pain and myalgias.   Neurological:  Negative for headaches.   Hematological:  Negative for adenopathy.   Psychiatric/Behavioral:  Negative for depression and sleep disturbance. The patient is nervous/anxious.       Objective:     Vital Signs (Most Recent):  Temp: 99.7 °F (37.6 °C) (02/15/24 2354)  Pulse: 107 (02/15/24 2354)  Resp: 19 (02/15/24 2354)  BP: (!) 96/58 (02/15/24 2354)  SpO2: 96 % (02/15/24 2354) Vital Signs (24h Range):  Temp:  [98.6 °F (37 °C)-99.7 °F (37.6 °C)] 99.7 °F (37.6 °C)  Pulse:  [107-117] 107  Resp:  [18-20] 19  SpO2:  [96 %-100 %] 96 %  BP: ()/(58-81) 96/58     Weight: 108.9 kg (240 lb)  Body mass index is 37.59 kg/m².    No LMP recorded. (Menstrual status: Birth Control).     Physical Exam:   Constitutional: She is oriented to person, place, and time. She appears well-developed and well-nourished. She appears distressed.    HENT:   Head: Normocephalic and atraumatic.   Nose: No epistaxis.    Eyes: Pupils are equal, round, and reactive to light.    Neck: No thyromegaly present.    Cardiovascular:  Normal rate and regular rhythm.             Pulmonary/Chest: Effort normal. No respiratory distress.        Abdominal: Soft. There is abdominal tenderness. There is no rebound and no guarding.     Genitourinary:    Urethra and uterus normal.      Pelvic exam was performed with patient in the lithotomy position.   The external female genitalia was normal.   The external female genitalia was abnormal.   Cervix is normal. Right adnexum displays tenderness. Left adnexum displays tenderness. There is vaginal discharge in the vagina. No tenderness in the vagina.    No signs of injury in the vagina.   Cervix exhibits motion tenderness.    pap smear not completedUterus is not fixed and not tender. Normal urethral meatus.          Musculoskeletal: Moves all extremeties. No tenderness or edema.       Neurological: She is alert and  "oriented to person, place, and time.    Skin: Skin is warm and dry. She is not diaphoretic.    Psychiatric: She has a normal mood and affect. Her behavior is normal.        Laboratory:  BMP:   Recent Labs   Lab 02/15/24  1538   GLU 74   *   K 4.3      CO2 22   BUN 15   CREATININE 1.08*   CALCIUM 8.5     CBC:   Recent Labs   Lab 02/15/24  1538   WBC 21.12*   RBC 4.50   HGB 10.9*   HCT 33.3*      MCV 74.0*   MCH 24.2*   MCHC 32.7     CMP:   Recent Labs   Lab 02/15/24  1538   GLU 74   CALCIUM 8.5   *   K 4.3   CO2 22      BUN 15   CREATININE 1.08*     Urine Pregnancy Test: No results for input(s): "PREGTESTUR" in the last 48 hours.  Recent Labs   Lab 02/15/24  1608   COLORU Yellow   SPECGRAV 1.028   PHUR 5.5   PROTEINUA 70*   BACTERIA Occasional*   NITRITE Negative   LEUKOCYTESUR Moderate*   UROBILINOGEN Normal   HYALINECASTS 2-5*     Recent Lab Results         02/15/24  2115   02/15/24  1926   02/15/24  1608   02/15/24  1538        Amorphous Crystals, UA     Occ         Amylase Level       15       Anion Gap       16       Aniso       1+       Appearance, UA     Turbid         Bacteria - Vaginal Screen Moderate             Bacteria, UA     Occasional         Bands       16       Baso #       0.10       Basophil %       0.5       Bilirubin (UA)     Negative         BUN       15       BUN/CREAT RATIO       14       Calcium       8.5       Chloride       100       Clue Cells, Wet Prep Few             CO2       22       Color, UA     Yellow         Creatinine       1.08       Differential Method       Manual       eGFR       71       Eos #       0.07       Eos %       0.3       Glucose       74       Glucose, UA     Normal         Hematocrit       33.3       Hemoglobin       10.9       Hyaline Casts, UA     2-5         Hypo       1+       Immature Grans (Abs)       0.71       Immature Granulocytes       3.4       Ketones, UA     Trace         Leukocyte Esterase, UA     Moderate         " Lipase       <19       Lymph #       0.49       Lymph %       2.3              8       MCH       24.2       MCHC       32.7       MCV       74.0       Microcytosis       2+       Mono #       0.40       Mono %       1.9       MPV       11.4       Mucous     Many         Neutrophils, Abs       19.35       Neutrophils Relative       91.6       NITRITE UA     Negative         nRBC       0.0       NUCLEATED RBC ABSOLUTE       0.00       Blood, UA     Small         pH, UA     5.5         PLATELET MORPHOLOGY       Normal       Platelet Count       201       Potassium       4.3       hCG Qualitative, Urine   Negative           Protein, UA     70          Acceptable   Yes           RBC       4.50       RBC Wet [place]             RBC, UA     3         RDW       17.2       Segmented Neutrophils, Man %       76       Sodium       134       Specific [place], UA     1.028         Squamous Epithelial Cells, UA     Occasional         Squamous Epithelials Wet [place] Moderate             Trichomonas Wet [place] None Seen             UROBILINOGEN UA     Normal         WBC - Vaginal Screen Few             WBC Clumps Wet [place] Seen             WBC, UA     19         WBC       21.12       Yeast, Wet Prep None Seen                   I have personallly reviewed all pertinent lab results from the last 24 hours.    Diagnostic Results:  US: Reviewed  CT: Reviewed

## 2024-02-16 NOTE — PHARMACY MED REC
"Admission Medication History     The home medication history was taken by Sadie Lundberg.    You may go to "Admission" then "Reconcile Home Medications" tabs to review and/or act upon these items.     The home medication list has been updated by the Pharmacy department.   Please read ALL comments highlighted in yellow.   Please address this information as you see fit.    Feel free to contact us if you have any questions or require assistance.      The medications listed below were removed from the home medication list. Please reorder if appropriate:  Patient reports no longer taking the following medication(s):  Docusate sodium 100 mg  Norco 7.5-325 mg      Medications listed below were obtained from: Patient/family and Analytic software- [employer]  (Not in a hospital admission)        Current Outpatient Medications on File Prior to Encounter   Medication Sig Dispense Refill Last Dose    sertraline (ZOLOFT) 100 MG tablet Take 100 mg by mouth once daily.   2/14/2024    ferrous sulfate 325 (65 FE) MG EC tablet Take 1 tablet (325 mg total) by mouth 3 (three) times daily with meals. 90 tablet 1 2/14/2024    NIFEdipine (PROCARDIA-XL) 60 MG (OSM) 24 hr tablet Take 1 tablet (60 mg total) by mouth once daily. 30 tablet 3 2/14/2024    PNV,calcium 72-iron-folic acid (PRENATAL VITAMIN PLUS LOW IRON) 27 mg iron- 1 mg Tab Take 1 tablet (1 each total) by mouth once daily. 30 tablet 11 2/14/2024       Potential issues to be addressed PRIOR TO DISCHARGE  N/A    Sadie Lundberg  EXT 1571                 .          "

## 2024-02-17 PROBLEM — N39.0 URINARY TRACT INFECTION WITHOUT HEMATURIA: Status: ACTIVE | Noted: 2024-02-17

## 2024-02-17 LAB
ALBUMIN SERPL BCP-MCNC: 2.4 G/DL (ref 3.5–5)
ALBUMIN/GLOB SERPL: 0.5 {RATIO}
ALP SERPL-CCNC: 101 U/L (ref 37–98)
ALT SERPL W P-5'-P-CCNC: 21 U/L (ref 13–56)
ANION GAP SERPL CALCULATED.3IONS-SCNC: 7 MMOL/L (ref 7–16)
ANISOCYTOSIS BLD QL SMEAR: ABNORMAL
AST SERPL W P-5'-P-CCNC: 26 U/L (ref 15–37)
BASOPHILS # BLD AUTO: 0.11 K/UL (ref 0–0.2)
BASOPHILS NFR BLD AUTO: 0.6 % (ref 0–1)
BILIRUB SERPL-MCNC: 0.8 MG/DL (ref ?–1.2)
BUN SERPL-MCNC: 18 MG/DL (ref 7–18)
BUN/CREAT SERPL: 17 (ref 6–20)
CALCIUM SERPL-MCNC: 8.3 MG/DL (ref 8.5–10.1)
CHLORIDE SERPL-SCNC: 110 MMOL/L (ref 98–107)
CO2 SERPL-SCNC: 22 MMOL/L (ref 21–32)
CREAT SERPL-MCNC: 1.08 MG/DL (ref 0.55–1.02)
CRENATED CELLS: ABNORMAL
DIFFERENTIAL METHOD BLD: ABNORMAL
EGFR (NO RACE VARIABLE) (RUSH/TITUS): 71 ML/MIN/1.73M2
EOSINOPHIL # BLD AUTO: 0.53 K/UL (ref 0–0.5)
EOSINOPHIL NFR BLD AUTO: 2.9 % (ref 1–4)
EOSINOPHIL NFR BLD MANUAL: 4 % (ref 1–4)
ERYTHROCYTE [DISTWIDTH] IN BLOOD BY AUTOMATED COUNT: 17.1 % (ref 11.5–14.5)
GLOBULIN SER-MCNC: 4.4 G/DL (ref 2–4)
GLUCOSE SERPL-MCNC: 83 MG/DL (ref 74–106)
HCT VFR BLD AUTO: 31.4 % (ref 38–47)
HGB BLD-MCNC: 10 G/DL (ref 12–16)
IMM GRANULOCYTES # BLD AUTO: 0.1 K/UL (ref 0–0.04)
IMM GRANULOCYTES NFR BLD: 0.5 % (ref 0–0.4)
LYMPHOCYTES # BLD AUTO: 1.08 K/UL (ref 1–4.8)
LYMPHOCYTES NFR BLD AUTO: 5.8 % (ref 27–41)
LYMPHOCYTES NFR BLD MANUAL: 8 % (ref 27–41)
MCH RBC QN AUTO: 23.5 PG (ref 27–31)
MCHC RBC AUTO-ENTMCNC: 31.8 G/DL (ref 32–36)
MCV RBC AUTO: 73.7 FL (ref 80–96)
MICROCYTES BLD QL SMEAR: ABNORMAL
MONOCYTES # BLD AUTO: 0.61 K/UL (ref 0–0.8)
MONOCYTES NFR BLD AUTO: 3.3 % (ref 2–6)
MONOCYTES NFR BLD MANUAL: 3 % (ref 2–6)
MPC BLD CALC-MCNC: ABNORMAL G/DL
NEUTROPHILS # BLD AUTO: 16.05 K/UL (ref 1.8–7.7)
NEUTROPHILS NFR BLD AUTO: 86.9 % (ref 53–65)
NEUTS BAND NFR BLD MANUAL: 11 % (ref 1–5)
NEUTS SEG NFR BLD MANUAL: 74 % (ref 50–62)
NRBC # BLD AUTO: 0 X10E3/UL
NRBC, AUTO (.00): 0 %
PLATELET # BLD AUTO: 149 K/UL (ref 150–400)
PLATELET MORPHOLOGY: ABNORMAL
POTASSIUM SERPL-SCNC: 4 MMOL/L (ref 3.5–5.1)
PROT SERPL-MCNC: 6.8 G/DL (ref 6.4–8.2)
RBC # BLD AUTO: 4.26 M/UL (ref 4.2–5.4)
SODIUM SERPL-SCNC: 135 MMOL/L (ref 136–145)
TARGETS BLD QL SMEAR: ABNORMAL
UA COMPLETE W REFLEX CULTURE PNL UR: ABNORMAL
WBC # BLD AUTO: 18.48 K/UL (ref 4.5–11)

## 2024-02-17 PROCEDURE — G0378 HOSPITAL OBSERVATION PER HR: HCPCS

## 2024-02-17 PROCEDURE — 25000003 PHARM REV CODE 250: Performed by: OBSTETRICS & GYNECOLOGY

## 2024-02-17 PROCEDURE — 85025 COMPLETE CBC W/AUTO DIFF WBC: CPT | Performed by: OBSTETRICS & GYNECOLOGY

## 2024-02-17 PROCEDURE — 11000001 HC ACUTE MED/SURG PRIVATE ROOM

## 2024-02-17 PROCEDURE — 63600175 PHARM REV CODE 636 W HCPCS: Mod: JZ,JG | Performed by: OBSTETRICS & GYNECOLOGY

## 2024-02-17 PROCEDURE — 63600175 PHARM REV CODE 636 W HCPCS: Performed by: OBSTETRICS & GYNECOLOGY

## 2024-02-17 PROCEDURE — 80053 COMPREHEN METABOLIC PANEL: CPT | Performed by: OBSTETRICS & GYNECOLOGY

## 2024-02-17 RX ORDER — SODIUM CHLORIDE 450 MG/100ML
INJECTION, SOLUTION INTRAVENOUS CONTINUOUS
Status: DISCONTINUED | OUTPATIENT
Start: 2024-02-17 | End: 2024-02-17 | Stop reason: SDUPTHER

## 2024-02-17 RX ORDER — CLINDAMYCIN PHOSPHATE 900 MG/50ML
900 INJECTION, SOLUTION INTRAVENOUS
Status: DISCONTINUED | OUTPATIENT
Start: 2024-02-17 | End: 2024-02-17

## 2024-02-17 RX ORDER — CLINDAMYCIN PHOSPHATE 900 MG/50ML
900 INJECTION, SOLUTION INTRAVENOUS
Status: DISCONTINUED | OUTPATIENT
Start: 2024-02-17 | End: 2024-02-20 | Stop reason: HOSPADM

## 2024-02-17 RX ADMIN — SERTRALINE HYDROCHLORIDE 100 MG: 50 TABLET ORAL at 08:02

## 2024-02-17 RX ADMIN — AMPICILLIN SODIUM 2 G: 2 INJECTION, POWDER, FOR SOLUTION INTRAMUSCULAR; INTRAVENOUS at 08:02

## 2024-02-17 RX ADMIN — CLINDAMYCIN PHOSPHATE 900 MG: 900 INJECTION, SOLUTION INTRAVENOUS at 05:02

## 2024-02-17 RX ADMIN — AMPICILLIN SODIUM 2 G: 2 INJECTION, POWDER, FOR SOLUTION INTRAMUSCULAR; INTRAVENOUS at 01:02

## 2024-02-17 RX ADMIN — POTASSIUM CHLORIDE 125 ML/HR: 2 INJECTION, SOLUTION, CONCENTRATE INTRAVENOUS at 03:02

## 2024-02-17 RX ADMIN — HYDROCODONE BITARTRATE AND ACETAMINOPHEN 1 TABLET: 5; 325 TABLET ORAL at 08:02

## 2024-02-17 RX ADMIN — CLINDAMYCIN PHOSPHATE 900 MG: 900 INJECTION, SOLUTION INTRAVENOUS at 01:02

## 2024-02-17 RX ADMIN — HYDROCODONE BITARTRATE AND ACETAMINOPHEN 1 TABLET: 5; 325 TABLET ORAL at 06:02

## 2024-02-17 RX ADMIN — HYDROCODONE BITARTRATE AND ACETAMINOPHEN 1 TABLET: 5; 325 TABLET ORAL at 02:02

## 2024-02-17 RX ADMIN — GENTAMICIN SULFATE 400 MG: 40 INJECTION, SOLUTION INTRAMUSCULAR; INTRAVENOUS at 03:02

## 2024-02-17 RX ADMIN — AMPICILLIN SODIUM 2 G: 2 INJECTION, POWDER, FOR SOLUTION INTRAMUSCULAR; INTRAVENOUS at 04:02

## 2024-02-17 RX ADMIN — CLINDAMYCIN PHOSPHATE 900 MG: 900 INJECTION, SOLUTION INTRAVENOUS at 09:02

## 2024-02-17 RX ADMIN — IBUPROFEN 600 MG: 600 TABLET ORAL at 08:02

## 2024-02-17 RX ADMIN — POTASSIUM CHLORIDE 125 ML/HR: 2 INJECTION, SOLUTION, CONCENTRATE INTRAVENOUS at 02:02

## 2024-02-17 NOTE — PLAN OF CARE
Problem: Adult Inpatient Plan of Care  Goal: Plan of Care Review  Outcome: Ongoing, Progressing  Goal: Patient-Specific Goal (Individualized)  Outcome: Ongoing, Progressing  Goal: Absence of Hospital-Acquired Illness or Injury  Outcome: Ongoing, Progressing  Goal: Optimal Comfort and Wellbeing  Outcome: Ongoing, Progressing  Intervention: Monitor Pain and Promote Comfort  Flowsheets (Taken 2/17/2024 1346)  Pain Management Interventions:   care clustered   pillow support provided   relaxation techniques promoted   quiet environment facilitated   position adjusted  Intervention: Provide Person-Centered Care  Flowsheets (Taken 2/17/2024 1346)  Trust Relationship/Rapport:   care explained   reassurance provided   choices provided   thoughts/feelings acknowledged   emotional support provided   empathic listening provided   questions encouraged   questions answered  Goal: Readiness for Transition of Care  Outcome: Ongoing, Progressing

## 2024-02-17 NOTE — PROGRESS NOTES
"2/16/2024 1915    Afebrile vs stable  Resting comfortably, reports pain "about the same"  Took pain pill about 2 hours ago. States that was first time that she needed pain pill today.  No vomiting today, voiding ok  Able to eat small amounts, drinking ok  States currently on menses, reports this is her 18th day of bleeding  Reports she has anxiety disorder and is requesting her zoloft    Abdomen soft, moderately tender RLQ, no guarding, no rebound  No CVAT    Will continue antibiotics  Recheck WBC, serum Cr  Zoloft 100 mg q HS  "

## 2024-02-17 NOTE — CONSULTS
Pharmacy consulted to dose Gentamicin.    Based on pt adjusted (dosing) wt of 81 kg and dosing based on 5 mg/kg daily with a Scr 1.61, will begin Gentamicin 400 mg IV q 24 hrs with trough prior 2nd dose.    Goal trough of 1 or less.    Pharmacy to follow daily and make necessary adjustments.    Thank you.

## 2024-02-17 NOTE — PLAN OF CARE
[facility]  Initial Discharge Assessment       Primary Care Provider: No, Primary Doctor    Admission Diagnosis: Tubo-ovarian abscess [N70.93]  Urinary tract infection without hematuria, site unspecified [N39.0]    Admission Date: 2/15/2024  Expected Discharge Date:     Transition of Care Barriers: (P) None    Payor: MEDICAID [place] / Plan: MEDICAID [facility] [place] / Product Type: Managed Medicaid /     Extended Emergency Contact Information  Primary Emergency Contact: Edwin Malhotra  Address: [place] [address] [place]  Mobile Phone: [phone]  Relation: Significant other    Discharge Plan A: (P) Home  Discharge Plan B: (P) Home      [facility] [place] [address]  [address] [facility] [address]  Phone: [phone] Fax: [phone]    [facility] [address] [place] [address] [address] [place] [address]  [address] [place] [address]  Phone: [phone] Fax: [phone]    [facility] [place] [place] - [address]  [address] [place] [address]  Phone: [phone] Fax: [phone]    [place] - [address]  [address] [address]  [facility] [address]  Phone: [phone] Fax: [phone]      Initial Assessment (most recent)       Adult Discharge Assessment - 02/17/24 1545          Discharge Assessment    Assessment Type Discharge Planning Assessment (P)      Confirmed/corrected address, phone number and insurance Yes (P)      Source of Information patient (P)      Communicated JOAN with patient/caregiver Date not available/Unable to determine (P)      People in Home alone (P)      Do you expect to return to your current living situation? Yes (P)      Do you have help at home or someone to help you manage your care at home? Yes (P)      Who are your caregiver(s) and their phone number(s)? Vidhi Parisi (Mother) [phone] (P)       Prior to hospitilization cognitive status: Alert/Oriented (P)      Current cognitive status: Alert/Oriented (P)      Walking or Climbing Stairs Difficulty no (P)      Dressing/Bathing Difficulty no (P)      Do you have any problems with: -- (P)    No problems reported by pt    Equipment Currently Used at Home none (P)      Readmission within 30 days? No (P)      Patient currently being followed by outpatient case management? No (P)      Do you currently have service(s) that help you manage your care at home? No (P)      Do you take prescription medications? Yes (P)      Do you have prescription coverage? Yes (P)      Coverage Medicaid [place] (P)      Do you have any problems affording any of your prescribed medications? No (P)      Is the patient taking medications as prescribed? yes (P)      Who is going to help you get home at discharge? Family (P)      How do you get to doctors appointments? car, drives self (P)      Are you on dialysis? No (P)      Do you take coumadin? No (P)      Discharge Plan A Home (P)      Discharge Plan B Home (P)      DME Needed Upon Discharge  none (P)      Discharge Plan discussed with: Patient (P)      Transition of Care Barriers None (P)         Physical Activity    On average, how many days per week do you engage in moderate to strenuous exercise (like a brisk walk)? 3 days (P)      On average, how many minutes do you engage in exercise at this level? 30 min (P)         Financial Resource Strain    How hard is it for you to pay for the very basics like food, housing, medical care, and heating? Not hard at all (P)         Housing Stability    In the last 12 months, was there a time when you were not able to pay the mortgage or rent on time? No (P)      In the last 12 months, how many places have you lived? 1 (P)      In the last 12 months, was there a time when you did not have a steady place to sleep or slept in a shelter (including now)? No (P)         Transportation Needs     In the past 12 months, has lack of transportation kept you from medical appointments or from getting medications? No (P)      In the past 12 months, has lack of transportation kept you from meetings, work, or from getting things needed for daily living? No (P)         Food Insecurity    Within the past 12 months, you worried that your food would run out before you got the money to buy more. Never true (P)      Within the past 12 months, the food you bought just didn't last and you didn't have money to get more. Never true (P)         Stress    Do you feel stress - tense, restless, nervous, or anxious, or unable to sleep at night because your mind is troubled all the time - these days? Not at all (P)         Social Connections    In a typical week, how many times do you talk on the phone with family, friends, or neighbors? More than three times a week (P)      How often do you get together with friends or relatives? More than three times a week (P)      How often do you attend Mormon or Anglican services? More than 4 times per year (P)      Do you belong to any clubs or organizations such as Mormon groups, unions, fraternal or athletic groups, or school groups? No (P)      How often do you attend meetings of the clubs or organizations you belong to? Never (P)      Are you , , , , never , or living with a partner?  (P)         Alcohol Use    Q1: How often do you have a drink containing alcohol? -- (P)    Pt reports she drinks occassionally    Q2: How many drinks containing alcohol do you have on a typical day when you are drinking? Patient unable to answer (P)      Q3: How often do you have six or more drinks on one occasion? Never (P)                  Spoke with pt for her initial discharge assessment.  Pt lives alone, does not have home health and uses no durable medical equipment.  Per pt, she will return home when medically ready.  SS will continue to follow for  discharge needs.

## 2024-02-17 NOTE — PROGRESS NOTES
Afebrile  Nurse reports not required any pain meds since this afternoon.  WBC increased to 20.57  (21.12-->16.60-->20.57)  Serum Cr 1.61 (down from 2.06)  K 3.3  AST 39/ALT 22  Urine culture-->100,000 strep pyogenes (group A) universal susceptibility to penicillin and other beta-lactams    Will change antibiotics to Ampicillin/Gentamicin/Clindamycin. (D/C mefoxin and doxycycline)  Will have pharmacy calculate dose/frequency for gentamicin.  Add Kcl to IVFs  Repeat labs in am

## 2024-02-18 LAB
ALBUMIN SERPL BCP-MCNC: 2.2 G/DL (ref 3.5–5)
ALBUMIN/GLOB SERPL: 0.5 {RATIO}
ALP SERPL-CCNC: 103 U/L (ref 37–98)
ALT SERPL W P-5'-P-CCNC: 21 U/L (ref 13–56)
ANION GAP SERPL CALCULATED.3IONS-SCNC: 7 MMOL/L (ref 7–16)
ANISOCYTOSIS BLD QL SMEAR: NORMAL
AST SERPL W P-5'-P-CCNC: 18 U/L (ref 15–37)
BASOPHILS # BLD AUTO: 0.04 K/UL (ref 0–0.2)
BASOPHILS NFR BLD AUTO: 0.2 % (ref 0–1)
BILIRUB SERPL-MCNC: 0.5 MG/DL (ref ?–1.2)
BUN SERPL-MCNC: 14 MG/DL (ref 7–18)
BUN/CREAT SERPL: 16 (ref 6–20)
CALCIUM SERPL-MCNC: 8.1 MG/DL (ref 8.5–10.1)
CHLORIDE SERPL-SCNC: 110 MMOL/L (ref 98–107)
CO2 SERPL-SCNC: 25 MMOL/L (ref 21–32)
CREAT SERPL-MCNC: 0.87 MG/DL (ref 0.55–1.02)
DIFFERENTIAL METHOD BLD: ABNORMAL
EGFR (NO RACE VARIABLE) (RUSH/TITUS): 92 ML/MIN/1.73M2
EOSINOPHIL # BLD AUTO: 0.59 K/UL (ref 0–0.5)
EOSINOPHIL NFR BLD AUTO: 3.5 % (ref 1–4)
ERYTHROCYTE [DISTWIDTH] IN BLOOD BY AUTOMATED COUNT: 17.2 % (ref 11.5–14.5)
GENTAMICIN TROUGH SERPL-MCNC: 13.9 ΜG/ML (ref 0.5–1.9)
GLOBULIN SER-MCNC: 4.3 G/DL (ref 2–4)
GLUCOSE SERPL-MCNC: 78 MG/DL (ref 74–106)
HCT VFR BLD AUTO: 28 % (ref 38–47)
HGB BLD-MCNC: 9 G/DL (ref 12–16)
IMM GRANULOCYTES # BLD AUTO: 0.22 K/UL (ref 0–0.04)
IMM GRANULOCYTES NFR BLD: 1.3 % (ref 0–0.4)
LYMPHOCYTES # BLD AUTO: 1.75 K/UL (ref 1–4.8)
LYMPHOCYTES NFR BLD AUTO: 10.4 % (ref 27–41)
MCH RBC QN AUTO: 23.8 PG (ref 27–31)
MCHC RBC AUTO-ENTMCNC: 32.1 G/DL (ref 32–36)
MCV RBC AUTO: 74.1 FL (ref 80–96)
MICROCYTES BLD QL SMEAR: NORMAL
MONOCYTES # BLD AUTO: 1.3 K/UL (ref 0–0.8)
MONOCYTES NFR BLD AUTO: 7.7 % (ref 2–6)
MPC BLD CALC-MCNC: 10.9 FL (ref 9.4–12.4)
NEUTROPHILS # BLD AUTO: 12.98 K/UL (ref 1.8–7.7)
NEUTROPHILS NFR BLD AUTO: 76.9 % (ref 53–65)
NRBC # BLD AUTO: 0 X10E3/UL
NRBC, AUTO (.00): 0 %
PLATELET # BLD AUTO: 212 K/UL (ref 150–400)
PLATELET MORPHOLOGY: NORMAL
POTASSIUM SERPL-SCNC: 3.9 MMOL/L (ref 3.5–5.1)
PROT SERPL-MCNC: 6.5 G/DL (ref 6.4–8.2)
RBC # BLD AUTO: 3.78 M/UL (ref 4.2–5.4)
SODIUM SERPL-SCNC: 138 MMOL/L (ref 136–145)
WBC # BLD AUTO: 16.88 K/UL (ref 4.5–11)

## 2024-02-18 PROCEDURE — 11000001 HC ACUTE MED/SURG PRIVATE ROOM

## 2024-02-18 PROCEDURE — 25000003 PHARM REV CODE 250: Performed by: OBSTETRICS & GYNECOLOGY

## 2024-02-18 PROCEDURE — 80053 COMPREHEN METABOLIC PANEL: CPT | Performed by: OBSTETRICS & GYNECOLOGY

## 2024-02-18 PROCEDURE — 63600175 PHARM REV CODE 636 W HCPCS: Mod: JZ,JG | Performed by: OBSTETRICS & GYNECOLOGY

## 2024-02-18 PROCEDURE — 63600175 PHARM REV CODE 636 W HCPCS: Performed by: OBSTETRICS & GYNECOLOGY

## 2024-02-18 PROCEDURE — 85025 COMPLETE CBC W/AUTO DIFF WBC: CPT | Performed by: OBSTETRICS & GYNECOLOGY

## 2024-02-18 PROCEDURE — 80170 ASSAY OF GENTAMICIN: CPT | Performed by: OBSTETRICS & GYNECOLOGY

## 2024-02-18 RX ORDER — ONDANSETRON HYDROCHLORIDE 2 MG/ML
4 INJECTION, SOLUTION INTRAVENOUS EVERY 4 HOURS PRN
Status: DISCONTINUED | OUTPATIENT
Start: 2024-02-18 | End: 2024-02-20 | Stop reason: HOSPADM

## 2024-02-18 RX ADMIN — AMPICILLIN SODIUM 2 G: 2 INJECTION, POWDER, FOR SOLUTION INTRAMUSCULAR; INTRAVENOUS at 01:02

## 2024-02-18 RX ADMIN — ONDANSETRON 8 MG: 4 TABLET, ORALLY DISINTEGRATING ORAL at 08:02

## 2024-02-18 RX ADMIN — AMPICILLIN SODIUM 2 G: 2 INJECTION, POWDER, FOR SOLUTION INTRAMUSCULAR; INTRAVENOUS at 08:02

## 2024-02-18 RX ADMIN — GENTAMICIN SULFATE 400 MG: 40 INJECTION, SOLUTION INTRAMUSCULAR; INTRAVENOUS at 04:02

## 2024-02-18 RX ADMIN — CLINDAMYCIN PHOSPHATE 900 MG: 900 INJECTION, SOLUTION INTRAVENOUS at 01:02

## 2024-02-18 RX ADMIN — AMPICILLIN SODIUM 2 G: 2 INJECTION, POWDER, FOR SOLUTION INTRAMUSCULAR; INTRAVENOUS at 04:02

## 2024-02-18 RX ADMIN — HYDROCODONE BITARTRATE AND ACETAMINOPHEN 1 TABLET: 10; 325 TABLET ORAL at 09:02

## 2024-02-18 RX ADMIN — IBUPROFEN 600 MG: 600 TABLET ORAL at 04:02

## 2024-02-18 RX ADMIN — CLINDAMYCIN PHOSPHATE 900 MG: 900 INJECTION, SOLUTION INTRAVENOUS at 09:02

## 2024-02-18 RX ADMIN — POTASSIUM CHLORIDE 125 ML/HR: 2 INJECTION, SOLUTION, CONCENTRATE INTRAVENOUS at 01:02

## 2024-02-18 RX ADMIN — CLINDAMYCIN PHOSPHATE 900 MG: 900 INJECTION, SOLUTION INTRAVENOUS at 05:02

## 2024-02-18 RX ADMIN — HYDROCODONE BITARTRATE AND ACETAMINOPHEN 1 TABLET: 10; 325 TABLET ORAL at 08:02

## 2024-02-18 RX ADMIN — SERTRALINE HYDROCHLORIDE 100 MG: 50 TABLET ORAL at 08:02

## 2024-02-18 RX ADMIN — HYDROCODONE BITARTRATE AND ACETAMINOPHEN 1 TABLET: 5; 325 TABLET ORAL at 05:02

## 2024-02-18 NOTE — HOSPITAL COURSE
2/17/2024   Patient continues to be afebrile with complaints of right lower quadrant abdominal pain and an elevated white count.  Urine culture and sensitivity grew out greater than 100,000 Gp. A pyogenes susceptible to penicillins.  Ultrasound showed bilateral ovarian cysts and some free fluid in the anterior and posterior cul-de-sac which could represent possible inflammatory process.  She was placed on triple antibiotics yesterday so is now only 48 hour on antibiotic therapy.  Patient states she is hungry and has had no for nausea vomiting die or diarrhea since admission.  She is also requested no narcotic pain medication.  We will continue the triple antibiotics through the weekend.    This is a 28-year-old female admitted by OB [facility] secondary to suspected tubo-ovarian abscess.  The patient remained afebrile throughout her entire hospital course.  She received prophylactic antibiotics and by hospital day number 4 she was ready for discharge.  Patient remained afebrile hemodynamically stable throughout her entire hospital course.  Disposition was tone.  Condition stable.

## 2024-02-18 NOTE — PROGRESS NOTES
[facility]  Obstetrics & Gynecology  Progress Note    Patient Name: Nigel Camejo  MRN: [id number]  Admission Date: 2/15/2024  Primary Care Provider: Deysi, Primary Doctor  Principal Problem: Tubo-ovarian abscess    Subjective:     HPI:  Patient is a 28-year-old  3 para 3 who presented to the emergency department with complaints of nausea vomiting diarrhea and abdominal pain that all started suddenly yesterday.  She is status post her 3rd  section 3 months ago and did well with the postop recovery.  Having last seen Dr. Hair for her 4 week postop on 2024.  At that time she received Depo-Provera which she reportedly tolerated well.  She did just stop bleeding after having a 3 week long period however.   She states that she experience nausea vomiting and diarrhea yesterday and it was only after having several episodes of vomiting did she start experiencing right lower quadrant pain.  She denied having any fever whatsoever and was afebrile when she arrived and during her time in the emergency department.  She continues to report right lower quadrant pain but her nausea has subsided somewhat.  In the emergency department a CBC was obtained which  revealed a white count of 21,000. A CT scan of the pelvis suggested possible cystic structures while a transvaginal ultrasound revealed bilateral ovarian cysts with complex fluid noted in the posterior and anterior cul-de-sacs.    Gyn was consulted for admission for treatment of a possible tubo-ovarian abscess.    Interval History: HD #2    Scheduled Meds:   ampicillin IV (PEDS and ADULTS)  2 g Intravenous Q8H    clindamycin IV (PEDS and ADULTS)  900 mg Intravenous Q8H    gentamicin  400 mg Intravenous Q24H    sertraline  100 mg Oral QHS     Continuous Infusions:   0.45% NaCl with KCl 20 mEq infusion 125 mL/hr (24 1441)     PRN Meds:gentamicin - pharmacy to dose, HYDROcodone-acetaminophen,  HYDROcodone-acetaminophen, ibuprofen, ondansetron, sodium chloride 0.9%    Review of patient's allergies indicates:  No Known Allergies    Objective:     Vital Signs (Most Recent):  Temp: 98 °F (36.7 °C) (02/17/24 1428)  Pulse: 93 (02/17/24 1428)  Resp: 16 (02/17/24 1441)  BP: 114/72 (02/17/24 1428)  SpO2: 99 % (02/17/24 1428) Vital Signs (24h Range):  Temp:  [97.7 °F (36.5 °C)-98.8 °F (37.1 °C)] 98 °F (36.7 °C)  Pulse:  [] 93  Resp:  [16-18] 16  SpO2:  [98 %-100 %] 99 %  BP: (107-126)/(63-78) 114/72     Weight: 110.2 kg (243 lb)  Body mass index is 38.06 kg/m².  No LMP recorded. (Menstrual status: Birth Control).    I&O (Last 24H):  No intake or output data in the 24 hours ending 02/17/24 1842      Laboratory:  CBC:   Recent Labs   Lab 02/17/24  0600   WBC 18.48*   RBC 4.26   HGB 10.0*   HCT 31.4*   *   MCV 73.7*   MCH 23.5*   MCHC 31.8*     CMP:   Recent Labs   Lab 02/17/24  0600   GLU 83   CALCIUM 8.3*   ALBUMIN 2.4*   PROT 6.8   *   K 4.0   CO2 22   *   BUN 18   CREATININE 1.08*   ALKPHOS 101*   ALT 21   AST 26   BILITOT 0.8     Recent Labs   Lab 02/15/24  2023   COLORU Colorless   SPECGRAV >1.050*   PHUR 5.5   PROTEINUA 20*   NITRITE Negative   LEUKOCYTESUR Negative   UROBILINOGEN Normal     Recent Lab Results         02/17/24  0600   02/16/24 1955        Albumin/Globulin Ratio 0.5   0.8       Albumin 2.4   2.8          100       ALT 21   22       Anion Gap 7   8       Aniso 1+   1+       AST 26   39       Bands 11   18       Baso # 0.11   0.12       Basophil % 0.6   0.6       BILIRUBIN TOTAL 0.8   1.2       BUN 18   25       BUN/CREAT RATIO 17   16       Calcium 8.3   8.2       Chloride 110   107       CO2 22   24       Creatinine 1.08   1.61       Crenated RBC's Few         Differential Method Scan Smear   Manual       eGFR 71   44       Eos # 0.53   0.38       Eos % 2.9   1.8        4   1       Globulin, Total 4.4   3.7       Glucose 83   86       Hematocrit 31.4   30.6        Hemoglobin 10.0   9.9       Immature Grans (Abs) 0.10   0.16       Immature Granulocytes 0.5   0.8       Lymph # 1.08   0.96       Lymph % 5.8   4.7        8   5       MCH 23.5   23.9       MCHC 31.8   32.4       MCV 73.7   73.9       Microcytosis 1+   1+       Mono # 0.61   0.63       Mono % 3.3   3.1        3   2       MPV   12.3       Neutrophils, Abs 16.05   18.32       Neutrophils Relative 86.9   89.0       nRBC 0.0   0.0       NUCLEATED RBC ABSOLUTE 0.00   0.00       PLATELET MORPHOLOGY Decreased  Comment: FEW LARGE/OCCASIONAL CLUMPS AND FIBRIN   Few Large Platelets       Platelet Count 149   209       Potassium 4.0   3.3       PROTEIN TOTAL 6.8   6.5       RBC 4.26   4.14       RDW 17.1   17.2       Segmented Neutrophils, Man % 74   74       Sodium 135   136       Target Cells Few         WBC 18.48   20.57             I have personallly reviewed all pertinent lab results from the last 24 hours.    Diagnostic Results:       Physical Exam:   Constitutional: She is oriented to person, place, and time. She appears well-developed and well-nourished. No distress.    HENT:   Head: Normocephalic and atraumatic.      Cardiovascular:  Normal rate.             Pulmonary/Chest: Effort normal. No respiratory distress.        Abdominal: Soft. There is abdominal tenderness. There is no rebound.             Musculoskeletal: Normal range of motion and moves all extremeties.       Neurological: She is alert and oriented to person, place, and time.     Psychiatric: She has a normal mood and affect. Her behavior is normal.        Review of Systems   Constitutional:  Negative for chills and fever.   HENT:  Negative for nasal congestion.    Eyes:  Negative for visual disturbance.   Respiratory:  Negative for cough, shortness of breath and wheezing.    Cardiovascular:  Negative for chest pain.   Gastrointestinal:  Positive for abdominal pain. Negative for constipation, diarrhea, nausea, vomiting and reflux.   Genitourinary:   Positive for pelvic pain and vaginal bleeding. Negative for flank pain, frequency and urgency.   Musculoskeletal:  Negative for back pain.   Neurological:  Negative for headaches.   Psychiatric/Behavioral:  Negative for depression. The patient is nervous/anxious.        Assessment/Plan:     * Tubo-ovarian abscess  Patient is being admitted for IV antibiotics for tubo-ovarian abscess    Patient presented with right lower quadrant abdominal pain, nausea vomiting and diarrhea, S he has a white count of 21.13k but remains afebrile.  She is being placed in observation for antiemetic and pain medication as well as IV antibiotics     Urinary tract infection without hematuria  .    Lower abdominal pain  .  Continue triple antibiotics        Madan Granados MD  Obstetrics & Gynecology  [facility] - [address] [place] [address]

## 2024-02-18 NOTE — SUBJECTIVE & OBJECTIVE
Interval History: HD #2    Scheduled Meds:   ampicillin IV (PEDS and ADULTS)  2 g Intravenous Q8H    clindamycin IV (PEDS and ADULTS)  900 mg Intravenous Q8H    gentamicin  400 mg Intravenous Q24H    sertraline  100 mg Oral QHS     Continuous Infusions:   0.45% NaCl with KCl 20 mEq infusion 125 mL/hr (02/17/24 1441)     PRN Meds:gentamicin - pharmacy to dose, HYDROcodone-acetaminophen, HYDROcodone-acetaminophen, ibuprofen, ondansetron, sodium chloride 0.9%    Review of patient's allergies indicates:  No Known Allergies    Objective:     Vital Signs (Most Recent):  Temp: 98 °F (36.7 °C) (02/17/24 1428)  Pulse: 93 (02/17/24 1428)  Resp: 16 (02/17/24 1441)  BP: 114/72 (02/17/24 1428)  SpO2: 99 % (02/17/24 1428) Vital Signs (24h Range):  Temp:  [97.7 °F (36.5 °C)-98.8 °F (37.1 °C)] 98 °F (36.7 °C)  Pulse:  [] 93  Resp:  [16-18] 16  SpO2:  [98 %-100 %] 99 %  BP: (107-126)/(63-78) 114/72     Weight: 110.2 kg (243 lb)  Body mass index is 38.06 kg/m².  No LMP recorded. (Menstrual status: Birth Control).    I&O (Last 24H):  No intake or output data in the 24 hours ending 02/17/24 1842      Laboratory:  CBC:   Recent Labs   Lab 02/17/24  0600   WBC 18.48*   RBC 4.26   HGB 10.0*   HCT 31.4*   *   MCV 73.7*   MCH 23.5*   MCHC 31.8*     CMP:   Recent Labs   Lab 02/17/24  0600   GLU 83   CALCIUM 8.3*   ALBUMIN 2.4*   PROT 6.8   *   K 4.0   CO2 22   *   BUN 18   CREATININE 1.08*   ALKPHOS 101*   ALT 21   AST 26   BILITOT 0.8     Recent Labs   Lab 02/15/24  2023   COLORU Colorless   SPECGRAV >1.050*   PHUR 5.5   PROTEINUA 20*   NITRITE Negative   LEUKOCYTESUR Negative   UROBILINOGEN Normal     Recent Lab Results         02/17/24  0600   02/16/24 1955        Albumin/Globulin Ratio 0.5   0.8       Albumin 2.4   2.8          100       ALT 21   22       Anion Gap 7   8       Aniso 1+   1+       AST 26   39       Bands 11   18       Baso # 0.11   0.12       Basophil % 0.6   0.6       BILIRUBIN TOTAL  0.8   1.2       BUN 18   25       BUN/CREAT RATIO 17   16       Calcium 8.3   8.2       Chloride 110   107       CO2 22   24       Creatinine 1.08   1.61       Crenated RBC's Few         Differential Method Scan Smear   Manual       eGFR 71   44       Eos # 0.53   0.38       Eos % 2.9   1.8        4   1       Globulin, Total 4.4   3.7       Glucose 83   86       Hematocrit 31.4   30.6       Hemoglobin 10.0   9.9       Immature Grans (Abs) 0.10   0.16       Immature Granulocytes 0.5   0.8       Lymph # 1.08   0.96       Lymph % 5.8   4.7        8   5       MCH 23.5   23.9       MCHC 31.8   32.4       MCV 73.7   73.9       Microcytosis 1+   1+       Mono # 0.61   0.63       Mono % 3.3   3.1        3   2       MPV   12.3       Neutrophils, Abs 16.05   18.32       Neutrophils Relative 86.9   89.0       nRBC 0.0   0.0       NUCLEATED RBC ABSOLUTE 0.00   0.00       PLATELET MORPHOLOGY Decreased  Comment: FEW LARGE/OCCASIONAL CLUMPS AND FIBRIN   Few Large Platelets       Platelet Count 149   209       Potassium 4.0   3.3       PROTEIN TOTAL 6.8   6.5       RBC 4.26   4.14       RDW 17.1   17.2       Segmented Neutrophils, Man % 74   74       Sodium 135   136       Target Cells Few         WBC 18.48   20.57             I have personallly reviewed all pertinent lab results from the last 24 hours.    Diagnostic Results:       Physical Exam:   Constitutional: She is oriented to person, place, and time. She appears well-developed and well-nourished. No distress.    HENT:   Head: Normocephalic and atraumatic.      Cardiovascular:  Normal rate.             Pulmonary/Chest: Effort normal. No respiratory distress.        Abdominal: Soft. There is abdominal tenderness. There is no rebound.             Musculoskeletal: Normal range of motion and moves all extremeties.       Neurological: She is alert and oriented to person, place, and time.     Psychiatric: She has a normal mood and affect. Her behavior is normal.        Review of  Systems   Constitutional:  Negative for chills and fever.   HENT:  Negative for nasal congestion.    Eyes:  Negative for visual disturbance.   Respiratory:  Negative for cough, shortness of breath and wheezing.    Cardiovascular:  Negative for chest pain.   Gastrointestinal:  Positive for abdominal pain. Negative for constipation, diarrhea, nausea, vomiting and reflux.   Genitourinary:  Positive for pelvic pain and vaginal bleeding. Negative for flank pain, frequency and urgency.   Musculoskeletal:  Negative for back pain.   Neurological:  Negative for headaches.   Psychiatric/Behavioral:  Negative for depression. The patient is nervous/anxious.

## 2024-02-18 NOTE — PLAN OF CARE
Problem: Adult Inpatient Plan of Care  Goal: Plan of Care Review  Outcome: Ongoing, Progressing  Goal: Patient-Specific Goal (Individualized)  Outcome: Ongoing, Progressing  Goal: Absence of Hospital-Acquired Illness or Injury  Outcome: Ongoing, Progressing  Goal: Optimal Comfort and Wellbeing  Outcome: Ongoing, Progressing  Intervention: Monitor Pain and Promote Comfort  Flowsheets (Taken 2/18/2024 1114)  Pain Management Interventions:   care clustered   pillow support provided   relaxation techniques promoted   quiet environment facilitated   position adjusted  Intervention: Provide Person-Centered Care  Flowsheets (Taken 2/18/2024 1114)  Trust Relationship/Rapport:   care explained   reassurance provided   choices provided   thoughts/feelings acknowledged   emotional support provided   empathic listening provided   questions answered   questions encouraged  Goal: Readiness for Transition of Care  Outcome: Ongoing, Progressing

## 2024-02-19 PROBLEM — I10 HYPERTENSION: Status: ACTIVE | Noted: 2024-02-19

## 2024-02-19 LAB
ANION GAP SERPL CALCULATED.3IONS-SCNC: 12 MMOL/L (ref 7–16)
ANISOCYTOSIS BLD QL SMEAR: ABNORMAL
BASOPHILS # BLD AUTO: 0.08 K/UL (ref 0–0.2)
BASOPHILS NFR BLD AUTO: 0.7 % (ref 0–1)
BUN SERPL-MCNC: 12 MG/DL (ref 7–18)
BUN/CREAT SERPL: 12 (ref 6–20)
CALCIUM SERPL-MCNC: 8.6 MG/DL (ref 8.5–10.1)
CHLORIDE SERPL-SCNC: 108 MMOL/L (ref 98–107)
CHOLEST SERPL-MCNC: 121 MG/DL (ref 0–200)
CHOLEST/HDLC SERPL: 6.4 {RATIO}
CO2 SERPL-SCNC: 26 MMOL/L (ref 21–32)
CREAT SERPL-MCNC: 1.03 MG/DL (ref 0.55–1.02)
DIFFERENTIAL METHOD BLD: ABNORMAL
EGFR (NO RACE VARIABLE) (RUSH/TITUS): 75 ML/MIN/1.73M2
EOSINOPHIL # BLD AUTO: 0.34 K/UL (ref 0–0.5)
EOSINOPHIL NFR BLD AUTO: 2.9 % (ref 1–4)
EOSINOPHIL NFR BLD MANUAL: 3 % (ref 1–4)
ERYTHROCYTE [DISTWIDTH] IN BLOOD BY AUTOMATED COUNT: 17.3 % (ref 11.5–14.5)
EST. AVERAGE GLUCOSE BLD GHB EST-MCNC: 103 MG/DL
GENTAMICIN TROUGH SERPL-MCNC: <0.2 ΜG/ML (ref 0.5–1.9)
GLUCOSE SERPL-MCNC: 75 MG/DL (ref 70–105)
GLUCOSE SERPL-MCNC: 76 MG/DL (ref 74–106)
HBA1C MFR BLD HPLC: 5.2 % (ref 4.5–6.6)
HCT VFR BLD AUTO: 29.5 % (ref 38–47)
HDLC SERPL-MCNC: 19 MG/DL (ref 40–60)
HGB BLD-MCNC: 9.4 G/DL (ref 12–16)
IMM GRANULOCYTES # BLD AUTO: 0.68 K/UL (ref 0–0.04)
IMM GRANULOCYTES NFR BLD: 5.9 % (ref 0–0.4)
LDLC SERPL CALC-MCNC: 68 MG/DL
LDLC/HDLC SERPL: 3.6 {RATIO}
LYMPHOCYTES # BLD AUTO: 2.39 K/UL (ref 1–4.8)
LYMPHOCYTES NFR BLD AUTO: 20.6 % (ref 27–41)
LYMPHOCYTES NFR BLD MANUAL: 22 % (ref 27–41)
MCH RBC QN AUTO: 23.5 PG (ref 27–31)
MCHC RBC AUTO-ENTMCNC: 31.9 G/DL (ref 32–36)
MCV RBC AUTO: 73.8 FL (ref 80–96)
MICROCYTES BLD QL SMEAR: ABNORMAL
MONOCYTES # BLD AUTO: 1.3 K/UL (ref 0–0.8)
MONOCYTES NFR BLD AUTO: 11.2 % (ref 2–6)
MONOCYTES NFR BLD MANUAL: 13 % (ref 2–6)
MPC BLD CALC-MCNC: 10.4 FL (ref 9.4–12.4)
NEUTROPHILS # BLD AUTO: 6.83 K/UL (ref 1.8–7.7)
NEUTROPHILS NFR BLD AUTO: 58.7 % (ref 53–65)
NEUTS BAND NFR BLD MANUAL: 5 % (ref 1–5)
NEUTS SEG NFR BLD MANUAL: 57 % (ref 50–62)
NONHDLC SERPL-MCNC: 102 MG/DL
NRBC # BLD AUTO: 0 X10E3/UL
NRBC, AUTO (.00): 0 %
OVALOCYTES BLD QL SMEAR: ABNORMAL
PLATELET # BLD AUTO: 253 K/UL (ref 150–400)
PLATELET MORPHOLOGY: ABNORMAL
POTASSIUM SERPL-SCNC: 3.7 MMOL/L (ref 3.5–5.1)
RBC # BLD AUTO: 4 M/UL (ref 4.2–5.4)
SODIUM SERPL-SCNC: 142 MMOL/L (ref 136–145)
TARGETS BLD QL SMEAR: ABNORMAL
TRIGL SERPL-MCNC: 171 MG/DL (ref 35–150)
TSH SERPL DL<=0.005 MIU/L-ACNC: 2.6 UIU/ML (ref 0.36–3.74)
VLDLC SERPL-MCNC: 34 MG/DL
WBC # BLD AUTO: 11.62 K/UL (ref 4.5–11)

## 2024-02-19 PROCEDURE — 80061 LIPID PANEL: CPT

## 2024-02-19 PROCEDURE — 25000003 PHARM REV CODE 250: Performed by: OBSTETRICS & GYNECOLOGY

## 2024-02-19 PROCEDURE — 63600175 PHARM REV CODE 636 W HCPCS

## 2024-02-19 PROCEDURE — 25000003 PHARM REV CODE 250

## 2024-02-19 PROCEDURE — 11000001 HC ACUTE MED/SURG PRIVATE ROOM

## 2024-02-19 PROCEDURE — 86140 C-REACTIVE PROTEIN: CPT

## 2024-02-19 PROCEDURE — 83036 HEMOGLOBIN GLYCOSYLATED A1C: CPT

## 2024-02-19 PROCEDURE — 80170 ASSAY OF GENTAMICIN: CPT | Performed by: OBSTETRICS & GYNECOLOGY

## 2024-02-19 PROCEDURE — 63600175 PHARM REV CODE 636 W HCPCS: Performed by: OBSTETRICS & GYNECOLOGY

## 2024-02-19 PROCEDURE — 84443 ASSAY THYROID STIM HORMONE: CPT

## 2024-02-19 PROCEDURE — 99222 1ST HOSP IP/OBS MODERATE 55: CPT | Mod: ,,, | Performed by: INTERNAL MEDICINE

## 2024-02-19 PROCEDURE — 85025 COMPLETE CBC W/AUTO DIFF WBC: CPT | Performed by: OBSTETRICS & GYNECOLOGY

## 2024-02-19 PROCEDURE — 80048 BASIC METABOLIC PNL TOTAL CA: CPT

## 2024-02-19 PROCEDURE — 99900035 HC TECH TIME PER 15 MIN (STAT)

## 2024-02-19 PROCEDURE — 82962 GLUCOSE BLOOD TEST: CPT

## 2024-02-19 RX ORDER — NALOXONE HCL 0.4 MG/ML
0.02 VIAL (ML) INJECTION
Status: DISCONTINUED | OUTPATIENT
Start: 2024-02-19 | End: 2024-02-20 | Stop reason: HOSPADM

## 2024-02-19 RX ORDER — LANOLIN ALCOHOL/MO/W.PET/CERES
1 CREAM (GRAM) TOPICAL DAILY
Status: DISCONTINUED | OUTPATIENT
Start: 2024-02-20 | End: 2024-02-20 | Stop reason: HOSPADM

## 2024-02-19 RX ORDER — TALC
6 POWDER (GRAM) TOPICAL NIGHTLY PRN
Status: DISCONTINUED | OUTPATIENT
Start: 2024-02-19 | End: 2024-02-20 | Stop reason: HOSPADM

## 2024-02-19 RX ORDER — IBUPROFEN 200 MG
16 TABLET ORAL
Status: DISCONTINUED | OUTPATIENT
Start: 2024-02-19 | End: 2024-02-20 | Stop reason: HOSPADM

## 2024-02-19 RX ORDER — BUSPIRONE HYDROCHLORIDE 5 MG/1
5 TABLET ORAL 2 TIMES DAILY
Status: DISCONTINUED | OUTPATIENT
Start: 2024-02-19 | End: 2024-02-20 | Stop reason: HOSPADM

## 2024-02-19 RX ORDER — ENOXAPARIN SODIUM 100 MG/ML
40 INJECTION SUBCUTANEOUS EVERY 24 HOURS
Status: DISCONTINUED | OUTPATIENT
Start: 2024-02-19 | End: 2024-02-20 | Stop reason: HOSPADM

## 2024-02-19 RX ORDER — SODIUM CHLORIDE 0.9 % (FLUSH) 0.9 %
10 SYRINGE (ML) INJECTION EVERY 12 HOURS PRN
Status: DISCONTINUED | OUTPATIENT
Start: 2024-02-19 | End: 2024-02-20 | Stop reason: HOSPADM

## 2024-02-19 RX ORDER — NIFEDIPINE 30 MG/1
60 TABLET, EXTENDED RELEASE ORAL DAILY
Status: DISCONTINUED | OUTPATIENT
Start: 2024-02-19 | End: 2024-02-20 | Stop reason: HOSPADM

## 2024-02-19 RX ORDER — GLUCAGON 1 MG
1 KIT INJECTION
Status: DISCONTINUED | OUTPATIENT
Start: 2024-02-19 | End: 2024-02-20 | Stop reason: HOSPADM

## 2024-02-19 RX ORDER — KETOROLAC TROMETHAMINE 15 MG/ML
15 INJECTION, SOLUTION INTRAMUSCULAR; INTRAVENOUS EVERY 6 HOURS PRN
Status: DISCONTINUED | OUTPATIENT
Start: 2024-02-19 | End: 2024-02-20 | Stop reason: HOSPADM

## 2024-02-19 RX ORDER — IBUPROFEN 200 MG
24 TABLET ORAL
Status: DISCONTINUED | OUTPATIENT
Start: 2024-02-19 | End: 2024-02-20 | Stop reason: HOSPADM

## 2024-02-19 RX ADMIN — GENTAMICIN SULFATE 400 MG: 40 INJECTION, SOLUTION INTRAMUSCULAR; INTRAVENOUS at 04:02

## 2024-02-19 RX ADMIN — KETOROLAC TROMETHAMINE 15 MG: 15 INJECTION, SOLUTION INTRAMUSCULAR; INTRAVENOUS at 06:02

## 2024-02-19 RX ADMIN — Medication 6 MG: at 10:02

## 2024-02-19 RX ADMIN — CLINDAMYCIN PHOSPHATE 900 MG: 900 INJECTION, SOLUTION INTRAVENOUS at 11:02

## 2024-02-19 RX ADMIN — HYDROCODONE BITARTRATE AND ACETAMINOPHEN 1 TABLET: 10; 325 TABLET ORAL at 01:02

## 2024-02-19 RX ADMIN — AMPICILLIN SODIUM 2 G: 2 INJECTION, POWDER, FOR SOLUTION INTRAMUSCULAR; INTRAVENOUS at 12:02

## 2024-02-19 RX ADMIN — BUSPIRONE HYDROCHLORIDE 5 MG: 5 TABLET ORAL at 09:02

## 2024-02-19 RX ADMIN — ONDANSETRON 8 MG: 4 TABLET, ORALLY DISINTEGRATING ORAL at 08:02

## 2024-02-19 RX ADMIN — NIFEDIPINE 60 MG: 30 TABLET, FILM COATED, EXTENDED RELEASE ORAL at 01:02

## 2024-02-19 RX ADMIN — AMPICILLIN SODIUM 2 G: 2 INJECTION, POWDER, FOR SOLUTION INTRAMUSCULAR; INTRAVENOUS at 09:02

## 2024-02-19 RX ADMIN — HYDROCODONE BITARTRATE AND ACETAMINOPHEN 1 TABLET: 5; 325 TABLET ORAL at 05:02

## 2024-02-19 RX ADMIN — CLINDAMYCIN PHOSPHATE 900 MG: 900 INJECTION, SOLUTION INTRAVENOUS at 06:02

## 2024-02-19 RX ADMIN — ONDANSETRON 4 MG: 2 INJECTION INTRAMUSCULAR; INTRAVENOUS at 12:02

## 2024-02-19 RX ADMIN — SERTRALINE HYDROCHLORIDE 100 MG: 50 TABLET ORAL at 09:02

## 2024-02-19 RX ADMIN — AMPICILLIN SODIUM 2 G: 2 INJECTION, POWDER, FOR SOLUTION INTRAMUSCULAR; INTRAVENOUS at 05:02

## 2024-02-19 RX ADMIN — IBUPROFEN 600 MG: 600 TABLET ORAL at 12:02

## 2024-02-19 RX ADMIN — CLINDAMYCIN PHOSPHATE 900 MG: 900 INJECTION, SOLUTION INTRAVENOUS at 01:02

## 2024-02-19 RX ADMIN — ENOXAPARIN SODIUM 40 MG: 40 INJECTION SUBCUTANEOUS at 05:02

## 2024-02-19 NOTE — SUBJECTIVE & OBJECTIVE
Past Medical History:   Diagnosis Date    Hypertension        Past Surgical History:   Procedure Laterality Date     SECTION      x2     SECTION N/A 2023    Procedure:  SECTION;  Surgeon: Jim England MD;  Location: [facility] L&[facility];  Service: OB/GYN;  Laterality: N/A;       Review of patient's allergies indicates:  No Known Allergies    No current facility-administered medications on file prior to encounter.     Current Outpatient Medications on File Prior to Encounter   Medication Sig    sertraline (ZOLOFT) 100 MG tablet Take 100 mg by mouth once daily.    ferrous sulfate 325 (65 FE) MG EC tablet Take 1 tablet (325 mg total) by mouth 3 (three) times daily with meals.    NIFEdipine (PROCARDIA-XL) 60 MG (OSM) 24 hr tablet Take 1 tablet (60 mg total) by mouth once daily.    PNV,calcium 72-iron-folic acid (PRENATAL VITAMIN PLUS LOW IRON) 27 mg iron- 1 mg Tab Take 1 tablet (1 each total) by mouth once daily.     Family History       Problem Relation (Age of Onset)    Bone cancer Maternal Grandmother    Cancer Maternal Grandmother, Maternal Grandfather, Maternal Aunt    Diabetes Father, Maternal Aunt, Maternal Aunt, Maternal Aunt    Heart failure Mother    Hypertension Mother    Kidney failure Maternal Aunt    Pancreatic cancer Maternal Grandfather    Stroke Maternal Aunt          Tobacco Use    Smoking status: Former     Passive exposure: Never    Smokeless tobacco: Never   Substance and Sexual Activity    Alcohol use: Not Currently    Drug use: Not Currently    Sexual activity: Yes     Partners: Male     Birth control/protection: None     Review of Systems   Constitutional:  Positive for appetite change. Negative for fever.   Respiratory:  Negative for shortness of breath.    Cardiovascular:  Positive for palpitations. Negative for chest pain.   Gastrointestinal:  Positive for nausea and vomiting. Negative for abdominal pain.   Genitourinary:  Positive for pelvic pain.    Neurological:  Positive for dizziness, light-headedness and numbness.     Objective:     Vital Signs (Most Recent):  Temp: 98.3 °F (36.8 °C) (02/19/24 1437)  Pulse: 75 (02/19/24 1437)  Resp: 18 (02/19/24 1437)  BP: 109/70 (02/19/24 1437)  SpO2: 100 % (02/19/24 1026) Vital Signs (24h Range):  Temp:  [97.5 °F (36.4 °C)-99.3 °F (37.4 °C)] 98.3 °F (36.8 °C)  Pulse:  [65-87] 75  Resp:  [16-18] 18  SpO2:  [97 %-100 %] 100 %  BP: (109-148)/(70-99) 109/70     Weight: 110.2 kg (243 lb)  Body mass index is 38.06 kg/m².     Physical Exam  Constitutional:       Appearance: Normal appearance.   HENT:      Head: Normocephalic and atraumatic.      Right Ear: External ear normal.      Left Ear: External ear normal.   Eyes:      Pupils: Pupils are equal, round, and reactive to light.   Cardiovascular:      Rate and Rhythm: Normal rate and regular rhythm.   Pulmonary:      Effort: Pulmonary effort is normal. No respiratory distress.      Breath sounds: Normal breath sounds. No wheezing.   Abdominal:      Palpations: Abdomen is soft.   Skin:     General: Skin is warm.   Neurological:      Mental Status: She is alert.   Psychiatric:         Attention and Perception: Attention normal.         Mood and Affect: Mood is depressed. Affect is tearful.         Speech: Speech normal.      Comments: No thoughts of self-harm            Significant Labs: All pertinent labs within the past 24 hours have been reviewed.    Significant Imaging: I have reviewed all pertinent imaging results/findings within the past 24 hours.

## 2024-02-19 NOTE — CONSULTS
[facility] - [address] [place] [address] Medicine  Consult Note    Patient Name: Nigel Camejo  MRN: [id number]  Admission Date: 2/15/2024  Hospital Length of Stay: 2 days  Attending Physician: Madan Granados MD   Primary Care Provider: Deysi Primary Doctor           Patient information was obtained from patient, past medical records, and ER records.     Consults  Patient is a 27 y/o F  s/p  2023 w/Pmhx HTN who presented to [facility] ED with nausea vomiting, diarrhea, menorrhagia and RLQ pain on 2/15/2024. She was found to have a WBC 89004. Pelvic ultrasound revealed bilateral ovarian cysts with complex fluid noted in the posterior and anterior cul-de-sacs. She was started on gentamicin, clindamycin, and ampicillin. Pt later endorsed lightheadedness and dizziness. Pt complained of a feeling of intense fear, palpitations, numbness & tingling in the hands. Pt has only endorsed this feeling once and states that she feared dying alone. Prior to her admission, she experience a separation between her significant other and lost her home. She is concerned because she does not have a stable home for her family, which includes her three children. She was very tearful on examination but denies any thoughts of self-harm. Additionally, she was experience elevated BP and has not received her home medications.      Family Medicine service has been consulted for the medical management of her pre-syncopal symptoms and elevated BP. Will evaluated and manage symptoms and conditions described above.     Subjective:     Principal Problem: Tubo-ovarian abscess    Chief Complaint:   Chief Complaint   Patient presents with    Abdominal Pain    Nausea    Vomiting    Anxiety     States symptoms started last night.         HPI: No notes on file    Past Medical History:   Diagnosis Date    Hypertension        Past Surgical History:   Procedure Laterality Date     SECTION      x2      SECTION N/A 2023    Procedure:  SECTION;  Surgeon: Jim England MD;  Location: [facility] L&D;  Service: OB/GYN;  Laterality: N/A;       Review of patient's allergies indicates:  No Known Allergies    No current facility-administered medications on file prior to encounter.     Current Outpatient Medications on File Prior to Encounter   Medication Sig    sertraline (ZOLOFT) 100 MG tablet Take 100 mg by mouth once daily.    ferrous sulfate 325 (65 FE) MG EC tablet Take 1 tablet (325 mg total) by mouth 3 (three) times daily with meals.    NIFEdipine (PROCARDIA-XL) 60 MG (OSM) 24 hr tablet Take 1 tablet (60 mg total) by mouth once daily.    PNV,calcium 72-iron-folic acid (PRENATAL VITAMIN PLUS LOW IRON) 27 mg iron- 1 mg Tab Take 1 tablet (1 each total) by mouth once daily.     Family History       Problem Relation (Age of Onset)    Bone cancer Maternal Grandmother    Cancer Maternal Grandmother, Maternal Grandfather, Maternal Aunt    Diabetes Father, Maternal Aunt, Maternal Aunt, Maternal Aunt    Heart failure Mother    Hypertension Mother    Kidney failure Maternal Aunt    Pancreatic cancer Maternal Grandfather    Stroke Maternal Aunt          Tobacco Use    Smoking status: Former     Passive exposure: Never    Smokeless tobacco: Never   Substance and Sexual Activity    Alcohol use: Not Currently    Drug use: Not Currently    Sexual activity: Yes     Partners: Male     Birth control/protection: None     Review of Systems   Constitutional:  Positive for appetite change. Negative for fever.   Respiratory:  Negative for shortness of breath.    Cardiovascular:  Positive for palpitations. Negative for chest pain.   Gastrointestinal:  Positive for nausea and vomiting. Negative for abdominal pain.   Genitourinary:  Positive for pelvic pain.   Neurological:  Positive for dizziness, light-headedness and numbness.     Objective:     Vital Signs (Most Recent):  Temp: 98.3 °F (36.8 °C) (24  1437)  Pulse: 75 (02/19/24 1437)  Resp: 18 (02/19/24 1437)  BP: 109/70 (02/19/24 1437)  SpO2: 100 % (02/19/24 1026) Vital Signs (24h Range):  Temp:  [97.5 °F (36.4 °C)-99.3 °F (37.4 °C)] 98.3 °F (36.8 °C)  Pulse:  [65-87] 75  Resp:  [16-18] 18  SpO2:  [97 %-100 %] 100 %  BP: (109-148)/(70-99) 109/70     Weight: 110.2 kg (243 lb)  Body mass index is 38.06 kg/m².     Physical Exam  Constitutional:       Appearance: Normal appearance.   HENT:      Head: Normocephalic and atraumatic.      Right Ear: External ear normal.      Left Ear: External ear normal.   Eyes:      Pupils: Pupils are equal, round, and reactive to light.   Cardiovascular:      Rate and Rhythm: Normal rate and regular rhythm.   Pulmonary:      Effort: Pulmonary effort is normal. No respiratory distress.      Breath sounds: Normal breath sounds. No wheezing.   Abdominal:      Palpations: Abdomen is soft.   Skin:     General: Skin is warm.   Neurological:      Mental Status: She is alert.   Psychiatric:         Attention and Perception: Attention normal.         Mood and Affect: Mood is depressed. Affect is tearful.         Speech: Speech normal.      Comments: No thoughts of self-harm            Significant Labs: All pertinent labs within the past 24 hours have been reviewed.    Significant Imaging: I have reviewed all pertinent imaging results/findings within the past 24 hours.  Assessment/Plan:     * Tubo-ovarian abscess    IV antibiotics for tubo-ovarian abcess    Postpartum depression  Patient has persistent depression which is moderate and is currently uncontrolled. Will Continue anti-depressant medications. We will not consult psychiatry at this time. Patient does not   display psychosis at this time. Continue to monitor closely and adjust plan of care as needed.    Added low dose buspar. Will refer to outpatient mental health provider.      Hypertension  Chronic, controlled. Latest blood pressure and vitals reviewed-     Temp:  [97.5 °F (36.4  °C)-99.3 °F (37.4 °C)]   Pulse:  [65-87]   Resp:  [16-18]   BP: (109-148)/(70-99)   SpO2:  [97 %-100 %] .   Home meds for hypertension were reviewed and noted below.   Hypertension Medications               NIFEdipine (PROCARDIA-XL) 60 MG (OSM) 24 hr tablet Take 1 tablet (60 mg total) by mouth once daily.            While in the hospital, will manage blood pressure as follows; Continue home antihypertensive regimen    Will utilize p.r.n. blood pressure medication only if patient's blood pressure greater than 180/110 and she develops symptoms such as worsening chest pain or shortness of breath.    Urinary tract infection without hematuria  Continue abx      Lower abdominal pain  Continue current management        VTE Risk Mitigation (From admission, onward)           Ordered     enoxaparin injection 40 mg  Daily         02/19/24 1448     IP VTE HIGH RISK PATIENT  Once         02/19/24 1448     Place JOE hose  Until discontinued         02/19/24 1448                        Thank you for your consult. I will follow-up with patient. Please contact us if you have any additional questions.    Katt Lam MD  Department of Hospital Medicine   [facility] - [facility] [address] [facility]

## 2024-02-19 NOTE — PROGRESS NOTES
Gent level drawn this AM drawn correctly and can be used to guide therapy. It resulted <0.2. Will continue current dosing of 400 mg IV Q24H. Trough will be repeated prior to Thursday's dose.

## 2024-02-19 NOTE — ASSESSMENT & PLAN NOTE
Patient has persistent depression which is moderate and is currently uncontrolled. Will Continue anti-depressant medications. We will not consult psychiatry at this time. Patient does not   display psychosis at this time. Continue to monitor closely and adjust plan of care as needed.    Added low dose buspar. Will refer to outpatient mental health provider.

## 2024-02-19 NOTE — PROGRESS NOTES
2/18/2024  1230  Pelvic infection (?TOA), UTI, Clinda/Gent/Amp  Anxiety disorder, zoloft  DMPA    Afebrile vs stable  Still complains of lower abdominal pain, primarily right sided  No nausea. No vomiting. No fever.    Abdomen soft, mildly tender RLQ, no guarding, no rebound    WBC down to 16.88 (18.48 yesterday)  Gentamicin trough not drawn at appropriate time-pharmacy managing gentamicin dosing    Will continue IV antibiotics  Recheck WBC in am  Possibly discharge tomorrow on po antibiotics, to follow up with Dr Hair

## 2024-02-19 NOTE — ASSESSMENT & PLAN NOTE
Chronic, controlled. Latest blood pressure and vitals reviewed-     Temp:  [97.5 °F (36.4 °C)-99.3 °F (37.4 °C)]   Pulse:  [65-87]   Resp:  [16-18]   BP: (109-148)/(70-99)   SpO2:  [97 %-100 %] .   Home meds for hypertension were reviewed and noted below.   Hypertension Medications               NIFEdipine (PROCARDIA-XL) 60 MG (OSM) 24 hr tablet Take 1 tablet (60 mg total) by mouth once daily.            While in the hospital, will manage blood pressure as follows; Continue home antihypertensive regimen    Will utilize p.r.n. blood pressure medication only if patient's blood pressure greater than 180/110 and she develops symptoms such as worsening chest pain or shortness of breath.

## 2024-02-20 VITALS
HEIGHT: 67 IN | DIASTOLIC BLOOD PRESSURE: 85 MMHG | OXYGEN SATURATION: 98 % | RESPIRATION RATE: 18 BRPM | HEART RATE: 61 BPM | WEIGHT: 243 LBS | SYSTOLIC BLOOD PRESSURE: 131 MMHG | BODY MASS INDEX: 38.14 KG/M2 | TEMPERATURE: 98 F

## 2024-02-20 PROBLEM — G47.00 INSOMNIA: Status: ACTIVE | Noted: 2024-02-20

## 2024-02-20 PROBLEM — F41.0 PANIC ATTACKS: Status: ACTIVE | Noted: 2024-02-20

## 2024-02-20 PROBLEM — R10.30 LOWER ABDOMINAL PAIN: Status: RESOLVED | Noted: 2024-02-16 | Resolved: 2024-02-20

## 2024-02-20 LAB — CRP SERPL-MCNC: 13.8 MG/DL (ref 0–0.8)

## 2024-02-20 PROCEDURE — 63600175 PHARM REV CODE 636 W HCPCS: Performed by: OBSTETRICS & GYNECOLOGY

## 2024-02-20 PROCEDURE — 99232 SBSQ HOSP IP/OBS MODERATE 35: CPT | Mod: ,,, | Performed by: INTERNAL MEDICINE

## 2024-02-20 PROCEDURE — 25000003 PHARM REV CODE 250

## 2024-02-20 PROCEDURE — 99238 HOSP IP/OBS DSCHRG MGMT 30/<: CPT | Mod: ,,, | Performed by: OBSTETRICS & GYNECOLOGY

## 2024-02-20 PROCEDURE — 25000003 PHARM REV CODE 250: Performed by: OBSTETRICS & GYNECOLOGY

## 2024-02-20 PROCEDURE — 25000003 PHARM REV CODE 250: Performed by: INTERNAL MEDICINE

## 2024-02-20 RX ORDER — SULFAMETHOXAZOLE AND TRIMETHOPRIM 800; 160 MG/1; MG/1
1 TABLET ORAL 2 TIMES DAILY
Qty: 14 TABLET | Refills: 0 | Status: SHIPPED | OUTPATIENT
Start: 2024-02-20 | End: 2024-02-27

## 2024-02-20 RX ORDER — ALPRAZOLAM 0.5 MG/1
0.5 TABLET ORAL 2 TIMES DAILY PRN
Status: DISCONTINUED | OUTPATIENT
Start: 2024-02-20 | End: 2024-02-20 | Stop reason: HOSPADM

## 2024-02-20 RX ORDER — BUSPIRONE HYDROCHLORIDE 5 MG/1
5 TABLET ORAL 2 TIMES DAILY
Qty: 60 TABLET | Refills: 0 | Status: SHIPPED | OUTPATIENT
Start: 2024-02-20 | End: 2024-04-11

## 2024-02-20 RX ADMIN — NIFEDIPINE 60 MG: 30 TABLET, FILM COATED, EXTENDED RELEASE ORAL at 08:02

## 2024-02-20 RX ADMIN — HYDROCODONE BITARTRATE AND ACETAMINOPHEN 1 TABLET: 5; 325 TABLET ORAL at 04:02

## 2024-02-20 RX ADMIN — GENTAMICIN SULFATE 400 MG: 40 INJECTION, SOLUTION INTRAMUSCULAR; INTRAVENOUS at 04:02

## 2024-02-20 RX ADMIN — ALPRAZOLAM 0.5 MG: 0.5 TABLET ORAL at 05:02

## 2024-02-20 RX ADMIN — AMPICILLIN SODIUM 2 G: 2 INJECTION, POWDER, FOR SOLUTION INTRAMUSCULAR; INTRAVENOUS at 12:02

## 2024-02-20 RX ADMIN — BUSPIRONE HYDROCHLORIDE 5 MG: 5 TABLET ORAL at 08:02

## 2024-02-20 RX ADMIN — FERROUS SULFATE TAB 325 MG (65 MG ELEMENTAL FE) 1 EACH: 325 (65 FE) TAB at 08:02

## 2024-02-20 RX ADMIN — CLINDAMYCIN PHOSPHATE 900 MG: 900 INJECTION, SOLUTION INTRAVENOUS at 01:02

## 2024-02-20 RX ADMIN — AMPICILLIN SODIUM 2 G: 2 INJECTION, POWDER, FOR SOLUTION INTRAMUSCULAR; INTRAVENOUS at 08:02

## 2024-02-20 NOTE — ASSESSMENT & PLAN NOTE
Patient endorses episodes of overwhelming fear, palpitations, hyperventilation. She is currently on an SSRI for postpartum depression.     - Added Buspar 5mg BID  - TSH WNL

## 2024-02-20 NOTE — CONSULTS
[facility] [address] [place] [address] Medicine  Consult Note    Patient Name: Nigel Camejo  MRN: [id number]  Admission Date: 2/15/2024  Hospital Length of Stay: 3 days  Attending Physician: Madan Granados MD   Primary Care Provider: Deysi Primary Doctor           Patient information was obtained from patient, past medical records, and ER records.     Consults  Subjective:     Principal Problem: Tubo-ovarian abscess    Chief Complaint:   Chief Complaint   Patient presents with    Abdominal Pain    Nausea    Vomiting    Anxiety     States symptoms started last night.         HPI: No notes on file    Past Medical History:   Diagnosis Date    Hypertension        Past Surgical History:   Procedure Laterality Date     SECTION      x2     SECTION N/A 2023    Procedure:  SECTION;  Surgeon: Jim England MD;  [facility];  Service: OB/GYN;  Laterality: N/A;       Review of patient's allergies indicates:  No Known Allergies    No current facility-administered medications on file prior to encounter.     Current Outpatient Medications on File Prior to Encounter   Medication Sig    sertraline (ZOLOFT) 100 MG tablet Take 100 mg by mouth once daily.    ferrous sulfate 325 (65 FE) MG EC tablet Take 1 tablet (325 mg total) by mouth 3 (three) times daily with meals.    NIFEdipine (PROCARDIA-XL) 60 MG (OSM) 24 hr tablet Take 1 tablet (60 mg total) by mouth once daily.    PNV,calcium 72-iron-folic acid (PRENATAL VITAMIN PLUS LOW IRON) 27 mg iron- 1 mg Tab Take 1 tablet (1 each total) by mouth once daily.     Family History       Problem Relation (Age of Onset)    Bone cancer Maternal Grandmother    Cancer Maternal Grandmother, Maternal Grandfather, Maternal Aunt    Diabetes Father, Maternal Aunt, Maternal Aunt, Maternal Aunt    Heart failure Mother    Hypertension Mother    Kidney failure Maternal Aunt    Pancreatic cancer Maternal Grandfather    Stroke Maternal  Aunt          Tobacco Use    Smoking status: Former     Passive exposure: Never    Smokeless tobacco: Never   Substance and Sexual Activity    Alcohol use: Not Currently    Drug use: Not Currently    Sexual activity: Yes     Partners: Male     Birth control/protection: None     Review of Systems   Constitutional:  Positive for appetite change. Negative for fever.   Respiratory:  Negative for shortness of breath.    Cardiovascular:  Positive for palpitations. Negative for chest pain.   Gastrointestinal:  Positive for abdominal pain, nausea and vomiting. Negative for constipation and diarrhea.   Genitourinary:  Positive for pelvic pain.   Neurological:  Negative for dizziness, light-headedness and numbness.   Psychiatric/Behavioral:  Positive for dysphoric mood and sleep disturbance. Negative for suicidal ideas. The patient is nervous/anxious.      Objective:     Vital Signs (Most Recent):  Temp: 97.9 °F (36.6 °C) (02/20/24 0629)  Pulse: 61 (02/20/24 0629)  Resp: 18 (02/20/24 0629)  BP: 131/85 (02/20/24 0629)  SpO2: 98 % (02/20/24 0629) Vital Signs (24h Range):  Temp:  [97.6 °F (36.4 °C)-98.5 °F (36.9 °C)] 97.9 °F (36.6 °C)  Pulse:  [61-87] 61  Resp:  [16-18] 18  SpO2:  [97 %-100 %] 98 %  BP: (108-148)/(70-99) 131/85     Weight: 110.2 kg (243 lb)  Body mass index is 38.06 kg/m².     Physical Exam  Constitutional:       Appearance: Normal appearance.   HENT:      Head: Normocephalic and atraumatic.      Right Ear: External ear normal.      Left Ear: External ear normal.      Nose: Nose normal.      Mouth/Throat:      Mouth: Mucous membranes are moist.   Eyes:      Extraocular Movements: Extraocular movements intact.      Conjunctiva/sclera: Conjunctivae normal.      Pupils: Pupils are equal, round, and reactive to light.   Cardiovascular:      Rate and Rhythm: Normal rate and regular rhythm.   Pulmonary:      Effort: Pulmonary effort is normal.      Breath sounds: Normal breath sounds.   Abdominal:      Tenderness:  There is abdominal tenderness (RLQ).   Musculoskeletal:         General: Normal range of motion.      Cervical back: Normal range of motion.   Skin:     General: Skin is warm.   Neurological:      General: No focal deficit present.      Mental Status: She is alert and oriented to person, place, and time.   Psychiatric:         Behavior: Behavior normal.         Thought Content: Thought content normal.         Judgment: Judgment normal.          Significant Labs: All pertinent labs within the past 24 hours have been reviewed.    Significant Imaging: I have reviewed all pertinent imaging results/findings within the past 24 hours.  Assessment/Plan:     * Tubo-ovarian abscess  IV antibiotics for tubo-ovarian abscess  - Ampicillin, Gentamicin, and Clindamycin (Day 4)  - Managed by primary team; plan for D/C on oral antibiotics     Urinary tract infection without hematuria  Urine culture: >100,000 Streptococcus pyogenes (Group A)    - sensitive to PCN; patient has received multiple days of ampicillin   - RESOLVED      Postpartum depression  Patient has persistent depression which is moderate and is currently uncontrolled. Will Continue anti-depressant medications. We will not consult psychiatry at this time. Patient does not   display psychosis at this time. Continue to monitor closely and adjust plan of care as needed.    Continue Zoloft   Will refer to outpatient mental health provider.      Panic attacks  Patient endorses episodes of overwhelming fear, palpitations, hyperventilation. She is currently on an SSRI for postpartum depression.     - Added Buspar 5mg BID  - TSH WNL      Hypertension  Chronic, controlled. Latest blood pressure and vitals reviewed-     Temp:  [97.6 °F (36.4 °C)-98.5 °F (36.9 °C)]   Pulse:  [61-87]   Resp:  [16-18]   BP: (108-148)/(70-99)   SpO2:  [97 %-100 %] .   Home meds for hypertension were reviewed and noted below.   Hypertension Medications               NIFEdipine (PROCARDIA-XL) 60 MG  (OSM) 24 hr tablet Take 1 tablet (60 mg total) by mouth once daily.            While in the hospital, will manage blood pressure as follows; Continue home antihypertensive regimen    Will utilize p.r.n. blood pressure medication only if patient's blood pressure greater than 180/110 and she develops symptoms such as worsening chest pain or shortness of breath.    Insomnia  Likely to be secondary to recent life stressors and depression.     - Melatonin/Trazodone PRN QHS        VTE Risk Mitigation (From admission, onward)           Ordered     enoxaparin injection 40 mg  Daily         02/19/24 1448     IP VTE HIGH RISK PATIENT  Once         02/19/24 1448     Place JOE hose  Until discontinued         02/19/24 1448                        Thank you for your consult. I will follow-up with patient. Please contact us if you have any additional questions.    Vandana Joy MD  Department of Hospital Medicine   [facility] - [address] [place] [address]

## 2024-02-20 NOTE — ASSESSMENT & PLAN NOTE
Urine culture: >100,000 Streptococcus pyogenes (Group A)    - sensitive to PCN; patient has received multiple days of ampicillin   - RESOLVED

## 2024-02-20 NOTE — ASSESSMENT & PLAN NOTE
Likely to be secondary to recent life stressors and depression.     - Melatonin/Trazodone PRN QHS

## 2024-02-20 NOTE — SUBJECTIVE & OBJECTIVE
Past Medical History:   Diagnosis Date    Hypertension        Past Surgical History:   Procedure Laterality Date     SECTION      x2     SECTION N/A 2023    Procedure:  SECTION;  Surgeon: Jim England MD;  Location: [facility] L&D;  Service: OB/GYN;  Laterality: N/A;       Review of patient's allergies indicates:  No Known Allergies    No current facility-administered medications on file prior to encounter.     Current Outpatient Medications on File Prior to Encounter   Medication Sig    sertraline (ZOLOFT) 100 MG tablet Take 100 mg by mouth once daily.    ferrous sulfate 325 (65 FE) MG EC tablet Take 1 tablet (325 mg total) by mouth 3 (three) times daily with meals.    NIFEdipine (PROCARDIA-XL) 60 MG (OSM) 24 hr tablet Take 1 tablet (60 mg total) by mouth once daily.    PNV,calcium 72-iron-folic acid (PRENATAL VITAMIN PLUS LOW IRON) 27 mg iron- 1 mg Tab Take 1 tablet (1 each total) by mouth once daily.     Family History       Problem Relation (Age of Onset)    Bone cancer Maternal Grandmother    Cancer Maternal Grandmother, Maternal Grandfather, Maternal Aunt    Diabetes Father, Maternal Aunt, Maternal Aunt, Maternal Aunt    Heart failure Mother    Hypertension Mother    Kidney failure Maternal Aunt    Pancreatic cancer Maternal Grandfather    Stroke Maternal Aunt          Tobacco Use    Smoking status: Former     Passive exposure: Never    Smokeless tobacco: Never   Substance and Sexual Activity    Alcohol use: Not Currently    Drug use: Not Currently    Sexual activity: Yes     Partners: Male     Birth control/protection: None     Review of Systems   Constitutional:  Positive for appetite change. Negative for fever.   Respiratory:  Negative for shortness of breath.    Cardiovascular:  Positive for palpitations. Negative for chest pain.   Gastrointestinal:  Positive for abdominal pain, nausea and vomiting. Negative for constipation and diarrhea.   Genitourinary:  Positive for  pelvic pain.   Neurological:  Negative for dizziness, light-headedness and numbness.   Psychiatric/Behavioral:  Positive for dysphoric mood and sleep disturbance. Negative for suicidal ideas. The patient is nervous/anxious.      Objective:     Vital Signs (Most Recent):  Temp: 97.9 °F (36.6 °C) (02/20/24 0629)  Pulse: 61 (02/20/24 0629)  Resp: 18 (02/20/24 0629)  BP: 131/85 (02/20/24 0629)  SpO2: 98 % (02/20/24 0629) Vital Signs (24h Range):  Temp:  [97.6 °F (36.4 °C)-98.5 °F (36.9 °C)] 97.9 °F (36.6 °C)  Pulse:  [61-87] 61  Resp:  [16-18] 18  SpO2:  [97 %-100 %] 98 %  BP: (108-148)/(70-99) 131/85     Weight: 110.2 kg (243 lb)  Body mass index is 38.06 kg/m².     Physical Exam  Constitutional:       Appearance: Normal appearance.   HENT:      Head: Normocephalic and atraumatic.      Right Ear: External ear normal.      Left Ear: External ear normal.      Nose: Nose normal.      Mouth/Throat:      Mouth: Mucous membranes are moist.   Eyes:      Extraocular Movements: Extraocular movements intact.      Conjunctiva/sclera: Conjunctivae normal.      Pupils: Pupils are equal, round, and reactive to light.   Cardiovascular:      Rate and Rhythm: Normal rate and regular rhythm.   Pulmonary:      Effort: Pulmonary effort is normal.      Breath sounds: Normal breath sounds.   Abdominal:      Tenderness: There is abdominal tenderness (RLQ).   Musculoskeletal:         General: Normal range of motion.      Cervical back: Normal range of motion.   Skin:     General: Skin is warm.   Neurological:      General: No focal deficit present.      Mental Status: She is alert and oriented to person, place, and time.   Psychiatric:         Behavior: Behavior normal.         Thought Content: Thought content normal.         Judgment: Judgment normal.          Significant Labs: All pertinent labs within the past 24 hours have been reviewed.    Significant Imaging: I have reviewed all pertinent imaging results/findings within the past 24  hours.

## 2024-02-20 NOTE — DISCHARGE INSTRUCTIONS
Start taking Buspar 5mg twice a day for anxiety  Continue taking Zoloft daily   Continue taking Nifedipine for high blood pressure     Follow-up with your PCP in 1 week in [facility]  Follow-up with Dr. Hair (OBGYN)

## 2024-02-20 NOTE — PLAN OF CARE
[facility] [address] [facility]  Discharge Final Note    Primary Care Provider: No, Primary Doctor    Expected Discharge Date: 2/20/2024    Final Discharge Note (most recent)       Final Note - 02/20/24 0837          Final Note    Assessment Type Final Discharge Note     Anticipated Discharge Disposition Home or Self Care        Post-Acute Status    Discharge Delays None known at this time                     Important Message from Medicare             Contact Info       [facility]   Specialty: Psychiatric Facility, Behavioral Health    [address] [place] [id number]   Phone: [phone]       Next Steps: Follow up    Instructions: Establish care for Postpartum Depression and Panic attacks    Jim England MD   Specialty: Obstetrics and Gynecology    [address] [place] [address]   Phone: [phone]       Next Steps: Follow up    Instructions: Follow-up for Depo-Provera injections    Jim England MD   Specialty: Obstetrics and Gynecology    [address] [place] [address]   Phone: [phone]       Next Steps: Follow up in 4 week(s)

## 2024-02-20 NOTE — ASSESSMENT & PLAN NOTE
IV antibiotics for tubo-ovarian abscess  - Ampicillin, Gentamicin, and Clindamycin (Day 4)  - Managed by primary team; plan for D/C on oral antibiotics

## 2024-02-21 LAB
BACTERIA BLD CULT: NORMAL
BACTERIA BLD CULT: NORMAL

## 2024-03-01 ENCOUNTER — TELEPHONE (OUTPATIENT)
Dept: OBSTETRICS AND GYNECOLOGY | Facility: CLINIC | Age: 29
End: 2024-03-01

## 2024-03-01 NOTE — TELEPHONE ENCOUNTER
----- Message from Candace Mendoza sent at 2/15/2024  8:04 AM CST -----  Pt will like to speak with a nurse.        [phone]

## 2024-03-06 NOTE — DISCHARGE SUMMARY
[facility] - [facility] Medical Telemetry  Obstetrics & Gynecology  Discharge Summary    Patient Name: Nigel Camejo  MRN: [id number]  Admission Date: 2/15/2024  Hospital Length of Stay: 3 days  Discharge Date and Time: 2024 10:38 AM  Attending Physician: Deysi att. providers found   Discharging Provider: Jim England MD  Primary Care Provider: Deysi, Primary Doctor    HPI:  Patient is a 28-year-old  3 para 3 who presented to the emergency department with complaints of nausea vomiting diarrhea and abdominal pain that all started suddenly yesterday.  She is status post her 3rd  section 3 months ago and did well with the postop recovery.  Having last seen Dr. Hair for her 4 week postop on 2024.  At that time she received Depo-Provera which she reportedly tolerated well.  She did just stop bleeding after having a 3 week long period however.   She states that she experience nausea vomiting and diarrhea yesterday and it was only after having several episodes of vomiting did she start experiencing right lower quadrant pain.  She denied having any fever whatsoever and was afebrile when she arrived and during her time in the emergency department.  She continues to report right lower quadrant pain but her nausea has subsided somewhat.  In the emergency department a CBC was obtained which  revealed a white count of 21,000. A CT scan of the pelvis suggested possible cystic structures while a transvaginal ultrasound revealed bilateral ovarian cysts with complex fluid noted in the posterior and anterior cul-de-sacs.    Gyn was consulted for admission for treatment of a possible tubo-ovarian abscess.    Hospital Course:  2024   Patient continues to be afebrile with complaints of right lower quadrant abdominal pain and an elevated white count.  Urine culture and sensitivity grew out greater than 100,000 Gp. A pyogenes susceptible to penicillins.  Ultrasound showed bilateral ovarian cysts  "and some free fluid in the anterior and posterior cul-de-sac which could represent possible inflammatory process.  She was placed on triple antibiotics yesterday so is now only 48 hour on antibiotic therapy.  Patient states she is hungry and has had no for nausea vomiting die or diarrhea since admission.  She is also requested no narcotic pain medication.  We will continue the triple antibiotics through the weekend.    This is a 28-year-old female admitted by OB [facility] secondary to suspected tubo-ovarian abscess.  The patient remained afebrile throughout her entire hospital course.  She received prophylactic antibiotics and by hospital day number 4 she was ready for discharge.  Patient remained afebrile hemodynamically stable throughout her entire hospital course.  Disposition was tone.  Condition stable.    Goals of Care Treatment Preferences:  Code Status: Full Code      * No surgery found *     Consults (From admission, onward)          Status Ordering Provider     Pharmacy to dose Aminoglycosides consult  Once        Provider:  (Not yet assigned)    Completed ZITA LEWIS            Significant Diagnostic Studies: Labs: CBC No results for input(s): "WBC", "HGB", "HCT", "PLT" in the last 48 hours.      Pending Diagnostic Studies:       Procedure Component Value Units Date/Time    EXTRA TUBES [[id number]] Collected: 02/18/24 0512    Order Status: Sent Lab Status: In process Updated: 02/18/24 0609    Specimen: Blood, Venous     Narrative:      The following orders were created for panel order EXTRA TUBES.  Procedure                               Abnormality         Status                     ---------                               -----------         ------                     Gold Top Hold[[id number]]                                   In process                   Please view results for these tests on the individual orders.          Final Active Diagnoses:    Diagnosis Date Noted POA    PRINCIPAL PROBLEM:  " Tubo-ovarian abscess [N70.93] 02/16/2024 Yes    Panic attacks [F41.0] 02/20/2024 Yes    Insomnia [G47.00] 02/20/2024 Yes    Hypertension [I10] 02/19/2024 Yes    Postpartum depression [F53.0] 02/19/2024 Yes    Urinary tract infection without hematuria [N39.0] 02/17/2024 Yes      Problems Resolved During this Admission:    Diagnosis Date Noted Date Resolved POA    Lower abdominal pain [R10.30] 02/16/2024 02/20/2024 Yes        Discharged Condition: good    Disposition: Home or Self Care    Follow Up:   Follow-up Information       [facility] - Follow up on 2/26/2024.    Specialties: Psychiatric Facility, Behavioral Health  Why: Establish care for Postpartum Depression and Panic attacks; 9:00 am  Contact information:  [address]  [place] [address]  [phone]               Jim England MD Follow up.    Specialty: Obstetrics and Gynecology  Why: Follow-up for Depo-Provera injections  Contact information:  [address] [place] [address]  [phone]               Jmi England MD Follow up on 3/19/2024.    Specialty: Obstetrics and Gynecology  Why: 9:00 am  Contact information:  [address] [place] [address]  [phone]                           Patient Instructions:      Diet Adult Regular     Pelvic Rest     Notify your health care provider if you experience any of the following:  temperature >100.4     Notify your health care provider if you experience any of the following:  persistent nausea and vomiting or diarrhea     Notify your health care provider if you experience any of the following:  severe uncontrolled pain     Notify your health care provider if you experience any of the following:  redness, tenderness, or signs of infection (pain, swelling, redness, odor or green/yellow discharge around incision site)     Notify your health care provider if you experience any of the following:  difficulty  breathing or increased cough     Notify your health care provider if you experience any of the following:  severe persistent headache     Notify your health care provider if you experience any of the following:  worsening rash     Notify your health care provider if you experience any of the following:  persistent dizziness, light-headedness, or visual disturbances     Notify your health care provider if you experience any of the following:  increased confusion or weakness     Medications:  Transfer Medications (for Discharge Readmit only):   Current Facility-Administered Medications   Medication Dose Route Frequency Provider Last Rate Last Admin    medroxyPROGESTERone (DEPO-PROVERA) injection 150 mg  150 mg Intramuscular Q90 Days Jim England MD   150 mg at 11/07/23 1136     Current Outpatient Medications   Medication Sig Dispense Refill    sertraline (ZOLOFT) 100 MG tablet Take 100 mg by mouth once daily.      busPIRone (BUSPAR) 5 MG Tab Take 1 tablet (5 mg total) by mouth 2 (two) times daily. 60 tablet 0    ferrous sulfate 325 (65 FE) MG EC tablet Take 1 tablet (325 mg total) by mouth 3 (three) times daily with meals. 90 tablet 1    NIFEdipine (PROCARDIA-XL) 60 MG (OSM) 24 hr tablet Take 1 tablet (60 mg total) by mouth once daily. 30 tablet 3    PNV,calcium 72-iron-folic acid (PRENATAL VITAMIN PLUS LOW IRON) 27 mg iron- 1 mg Tab Take 1 tablet (1 each total) by mouth once daily. 30 tablet 11       Jim England MD  Obstetrics & Gynecology  [facility] - [address] [place] [address]

## 2024-04-11 ENCOUNTER — OFFICE VISIT (OUTPATIENT)
Dept: OBSTETRICS AND GYNECOLOGY | Facility: CLINIC | Age: 29
End: 2024-04-11
Payer: MEDICAID

## 2024-04-11 VITALS
DIASTOLIC BLOOD PRESSURE: 86 MMHG | HEART RATE: 83 BPM | WEIGHT: 258 LBS | SYSTOLIC BLOOD PRESSURE: 137 MMHG | BODY MASS INDEX: 40.41 KG/M2

## 2024-04-11 DIAGNOSIS — Z30.9 ENCOUNTER FOR CONTRACEPTIVE MANAGEMENT, UNSPECIFIED TYPE: Primary | ICD-10-CM

## 2024-04-11 DIAGNOSIS — Z30.013 ENCOUNTER FOR INITIAL PRESCRIPTION OF INJECTABLE CONTRACEPTIVE: ICD-10-CM

## 2024-04-11 LAB
B-HCG UR QL: NEGATIVE
CTP QC/QA: YES

## 2024-04-11 PROCEDURE — 3008F BODY MASS INDEX DOCD: CPT | Mod: CPTII,,, | Performed by: OBSTETRICS & GYNECOLOGY

## 2024-04-11 PROCEDURE — 3044F HG A1C LEVEL LT 7.0%: CPT | Mod: CPTII,,, | Performed by: OBSTETRICS & GYNECOLOGY

## 2024-04-11 PROCEDURE — 96372 THER/PROPH/DIAG INJ SC/IM: CPT | Mod: ,,, | Performed by: OBSTETRICS & GYNECOLOGY

## 2024-04-11 PROCEDURE — 1159F MED LIST DOCD IN RCRD: CPT | Mod: CPTII,,, | Performed by: OBSTETRICS & GYNECOLOGY

## 2024-04-11 PROCEDURE — 81025 URINE PREGNANCY TEST: CPT | Mod: QW,,, | Performed by: OBSTETRICS & GYNECOLOGY

## 2024-04-11 PROCEDURE — 99214 OFFICE O/P EST MOD 30 MIN: CPT | Mod: 25,,, | Performed by: OBSTETRICS & GYNECOLOGY

## 2024-04-11 PROCEDURE — 3075F SYST BP GE 130 - 139MM HG: CPT | Mod: CPTII,,, | Performed by: OBSTETRICS & GYNECOLOGY

## 2024-04-11 PROCEDURE — 3079F DIAST BP 80-89 MM HG: CPT | Mod: CPTII,,, | Performed by: OBSTETRICS & GYNECOLOGY

## 2024-04-11 RX ORDER — MEDROXYPROGESTERONE ACETATE 150 MG/ML
150 INJECTION, SUSPENSION INTRAMUSCULAR
Status: SHIPPED | OUTPATIENT
Start: 2024-04-11 | End: 2025-07-05

## 2024-04-11 RX ADMIN — MEDROXYPROGESTERONE ACETATE 150 MG: 150 INJECTION, SUSPENSION INTRAMUSCULAR at 02:04

## 2024-04-11 NOTE — PROGRESS NOTES
History & Physical    SUBJECTIVE:     History of Present Illness:  Patient is a 28 y.o. female presents with wanting to restart Depo injections.     Chief Complaint   Patient presents with    Contraception     Pt wants to restart depo.    Follow-up       Review of patient's allergies indicates:   Allergen Reactions    Iodinated contrast media Itching       Current Outpatient Medications   Medication Sig Dispense Refill    busPIRone (BUSPAR) 5 MG Tab Take 1 tablet (5 mg total) by mouth 2 (two) times daily. 60 tablet 0    NIFEdipine (PROCARDIA-XL) 60 MG (OSM) 24 hr tablet Take 1 tablet (60 mg total) by mouth once daily. 30 tablet 3    sertraline (ZOLOFT) 100 MG tablet Take 100 mg by mouth once daily.      ferrous sulfate 325 (65 FE) MG EC tablet Take 1 tablet (325 mg total) by mouth 3 (three) times daily with meals. (Patient not taking: Reported on 2024) 90 tablet 1    PNV,calcium 72-iron-folic acid (PRENATAL VITAMIN PLUS LOW IRON) 27 mg iron- 1 mg Tab Take 1 tablet (1 each total) by mouth once daily. 30 tablet 11     Current Facility-Administered Medications   Medication Dose Route Frequency Provider Last Rate Last Admin    medroxyPROGESTERone (DEPO-PROVERA) injection 150 mg  150 mg Intramuscular Q90 Days Jim England MD   150 mg at 23 1136    medroxyPROGESTERone (DEPO-PROVERA) injection 150 mg  150 mg Intramuscular Q90 Days Jim England MD   150 mg at 24 1420       Past Medical History:   Diagnosis Date    Hypertension      Past Surgical History:   Procedure Laterality Date     SECTION      x2     SECTION N/A 2023    Procedure:  SECTION;  Surgeon: Jim England MD;  Location: [facility] L&D;  Service: OB/GYN;  Laterality: N/A;     Family History   Problem Relation Name Age of Onset    Bone cancer Maternal Grandmother Chandni Norman     Cancer Maternal Grandmother Chandni Norman     Pancreatic cancer Maternal Grandfather Jaswinder Greenwood     Cancer  Maternal Grandfather Jaswinder Alanis     Diabetes Father Nathan Soriano     Hypertension Mother Vidhi Parisi     Heart failure Mother Vidhi Parisi     Diabetes Maternal Aunt      Cancer Maternal Aunt      Kidney failure Maternal Aunt      Diabetes Maternal Aunt Marbella Verduzco     Stroke Maternal Aunt Marbella Verduzco     Diabetes Maternal Aunt Stella Flores      Social History     Tobacco Use    Smoking status: Former     Passive exposure: Never    Smokeless tobacco: Never   Substance Use Topics    Alcohol use: Not Currently    Drug use: Not Currently        Review of Systems:  Review of Systems   Constitutional:  Negative for appetite change, chills, fatigue and fever.   HENT: Negative.     Eyes: Negative.    Respiratory:  Negative for cough, chest tightness and shortness of breath.    Cardiovascular:  Negative for chest pain, palpitations and leg swelling.   Gastrointestinal:  Negative for abdominal distention, abdominal pain, blood in stool, constipation, diarrhea, nausea and vomiting.   Endocrine: Negative for cold intolerance, heat intolerance, polydipsia, polyphagia and polyuria.   Genitourinary:  Negative for difficulty urinating, dyspareunia, dysuria, flank pain, frequency, pelvic pain, urgency, vaginal bleeding, vaginal discharge and vaginal pain.   Musculoskeletal: Negative.    Skin: Negative.    Neurological: Negative.    Psychiatric/Behavioral: Negative.  Negative for agitation, behavioral problems, confusion and sleep disturbance. The patient is not nervous/anxious.        OBJECTIVE:     Vital Signs (Most Recent)  Pulse: 83 (04/11/24 1154)  BP: 137/86 (04/11/24 1154)     117 kg (258 lb)     Physical Exam:  Physical Exam  Vitals reviewed. Exam conducted with a chaperone present.   Constitutional:       Appearance: Normal appearance.   HENT:      Head: Normocephalic and atraumatic.      Mouth/Throat:      Mouth: Mucous membranes are moist.   Eyes:      Extraocular Movements: Extraocular movements intact.       Pupils: Pupils are equal, round, and reactive to light.   Cardiovascular:      Rate and Rhythm: Normal rate and regular rhythm.      Pulses: Normal pulses.      Heart sounds: Normal heart sounds.   Pulmonary:      Effort: Pulmonary effort is normal.      Breath sounds: Normal breath sounds.   Abdominal:      General: Abdomen is flat. Bowel sounds are normal.      Palpations: Abdomen is soft.   Musculoskeletal:         General: Normal range of motion.      Cervical back: Normal range of motion.   Skin:     General: Skin is warm and dry.   Neurological:      General: No focal deficit present.      Mental Status: She is alert and oriented to person, place, and time.   Psychiatric:         Mood and Affect: Mood normal.         Behavior: Behavior normal.         Thought Content: Thought content normal.         Judgment: Judgment normal.             ASSESSMENT/PLAN:   Nigel was seen today for contraception and follow-up.    Diagnoses and all orders for this visit:    Encounter for contraceptive management, unspecified type  -     POCT urine pregnancy    Encounter for initial prescription of injectable contraceptive  -     medroxyPROGESTERone (DEPO-PROVERA) injection 150 mg        PLAN:Plan      See above

## 2024-05-20 PROBLEM — N39.0 URINARY TRACT INFECTION WITHOUT HEMATURIA: Status: RESOLVED | Noted: 2024-02-17 | Resolved: 2024-05-20

## 2024-07-09 ENCOUNTER — OFFICE VISIT (OUTPATIENT)
Dept: OBSTETRICS AND GYNECOLOGY | Facility: CLINIC | Age: 29
End: 2024-07-09
Payer: MEDICAID

## 2024-07-09 VITALS
DIASTOLIC BLOOD PRESSURE: 70 MMHG | WEIGHT: 270.81 LBS | SYSTOLIC BLOOD PRESSURE: 123 MMHG | BODY MASS INDEX: 42.41 KG/M2 | HEART RATE: 85 BPM

## 2024-07-09 DIAGNOSIS — I10 HYPERTENSION, UNSPECIFIED TYPE: ICD-10-CM

## 2024-07-09 DIAGNOSIS — Z30.011 ENCOUNTER FOR INITIAL PRESCRIPTION OF CONTRACEPTIVE PILLS: Primary | ICD-10-CM

## 2024-07-09 PROCEDURE — 3078F DIAST BP <80 MM HG: CPT | Mod: CPTII,,, | Performed by: OBSTETRICS & GYNECOLOGY

## 2024-07-09 PROCEDURE — 1159F MED LIST DOCD IN RCRD: CPT | Mod: CPTII,,, | Performed by: OBSTETRICS & GYNECOLOGY

## 2024-07-09 PROCEDURE — 3074F SYST BP LT 130 MM HG: CPT | Mod: CPTII,,, | Performed by: OBSTETRICS & GYNECOLOGY

## 2024-07-09 PROCEDURE — 3008F BODY MASS INDEX DOCD: CPT | Mod: CPTII,,, | Performed by: OBSTETRICS & GYNECOLOGY

## 2024-07-09 PROCEDURE — 99214 OFFICE O/P EST MOD 30 MIN: CPT | Mod: ,,, | Performed by: OBSTETRICS & GYNECOLOGY

## 2024-07-09 PROCEDURE — 3044F HG A1C LEVEL LT 7.0%: CPT | Mod: CPTII,,, | Performed by: OBSTETRICS & GYNECOLOGY

## 2024-07-09 RX ORDER — DROSPIRENONE AND ETHINYL ESTRADIOL 0.02-3(28)
1 KIT ORAL DAILY
Qty: 30 TABLET | Refills: 11 | Status: SHIPPED | OUTPATIENT
Start: 2024-07-09 | End: 2025-07-09

## 2024-08-09 RX ORDER — NIFEDIPINE 60 MG/1
60 TABLET, EXTENDED RELEASE ORAL DAILY
Qty: 30 TABLET | Refills: 3 | Status: SHIPPED | OUTPATIENT
Start: 2024-08-09 | End: 2025-08-09

## 2024-08-20 ENCOUNTER — OFFICE VISIT (OUTPATIENT)
Dept: FAMILY MEDICINE | Facility: CLINIC | Age: 29
End: 2024-08-20
Payer: MEDICAID

## 2024-08-20 VITALS
WEIGHT: 262.13 LBS | BODY MASS INDEX: 41.14 KG/M2 | OXYGEN SATURATION: 98 % | TEMPERATURE: 99 F | HEART RATE: 87 BPM | SYSTOLIC BLOOD PRESSURE: 126 MMHG | DIASTOLIC BLOOD PRESSURE: 81 MMHG | HEIGHT: 67 IN

## 2024-08-20 DIAGNOSIS — R82.998 URINE WBC INCREASED: ICD-10-CM

## 2024-08-20 DIAGNOSIS — N92.0 MENORRHAGIA WITH REGULAR CYCLE: ICD-10-CM

## 2024-08-20 DIAGNOSIS — R30.0 DYSURIA: Primary | ICD-10-CM

## 2024-08-20 DIAGNOSIS — N30.90 CYSTITIS: ICD-10-CM

## 2024-08-20 LAB
ANISOCYTOSIS BLD QL SMEAR: ABNORMAL
BASOPHILS # BLD AUTO: 0.06 K/UL (ref 0–0.2)
BASOPHILS NFR BLD AUTO: 0.4 % (ref 0–1)
BILIRUB SERPL-MCNC: ABNORMAL MG/DL
BLOOD URINE, POC: ABNORMAL
CLARITY, POC UA: CLEAR
COLOR, POC UA: YELLOW
DIFFERENTIAL METHOD BLD: ABNORMAL
EOSINOPHIL # BLD AUTO: 0.15 K/UL (ref 0–0.5)
EOSINOPHIL NFR BLD AUTO: 1.1 % (ref 1–4)
ERYTHROCYTE [DISTWIDTH] IN BLOOD BY AUTOMATED COUNT: 16.4 % (ref 11.5–14.5)
GLUCOSE UR QL STRIP: ABNORMAL
HCT VFR BLD AUTO: 32.8 % (ref 38–47)
HGB BLD-MCNC: 9.6 G/DL (ref 12–16)
HYPOCHROMIA BLD QL SMEAR: ABNORMAL
IMM GRANULOCYTES # BLD AUTO: 0.06 K/UL (ref 0–0.04)
IMM GRANULOCYTES NFR BLD: 0.4 % (ref 0–0.4)
KETONES UR QL STRIP: ABNORMAL
LEUKOCYTE ESTERASE URINE, POC: ABNORMAL
LYMPHOCYTES # BLD AUTO: 3.45 K/UL (ref 1–4.8)
LYMPHOCYTES NFR BLD AUTO: 24.5 % (ref 27–41)
MCH RBC QN AUTO: 21.2 PG (ref 27–31)
MCHC RBC AUTO-ENTMCNC: 29.3 G/DL (ref 32–36)
MCV RBC AUTO: 72.6 FL (ref 80–96)
MICROCYTES BLD QL SMEAR: ABNORMAL
MONOCYTES # BLD AUTO: 0.64 K/UL (ref 0–0.8)
MONOCYTES NFR BLD AUTO: 4.5 % (ref 2–6)
MPC BLD CALC-MCNC: 11.6 FL (ref 9.4–12.4)
NEUTROPHILS # BLD AUTO: 9.72 K/UL (ref 1.8–7.7)
NEUTROPHILS NFR BLD AUTO: 69.1 % (ref 53–65)
NITRITE, POC UA: ABNORMAL
NRBC # BLD AUTO: 0 X10E3/UL
NRBC, AUTO (.00): 0 %
PH, POC UA: 6.5
PLATELET # BLD AUTO: 279 K/UL (ref 150–400)
PLATELET MORPHOLOGY: ABNORMAL
POLYCHROMASIA BLD QL SMEAR: ABNORMAL
PROTEIN, POC: ABNORMAL
RBC # BLD AUTO: 4.52 M/UL (ref 4.2–5.4)
SPECIFIC GRAVITY, POC UA: 1.03
UROBILINOGEN, POC UA: 0.2
WBC # BLD AUTO: 14.08 K/UL (ref 4.5–11)

## 2024-08-20 PROCEDURE — 85025 COMPLETE CBC W/AUTO DIFF WBC: CPT | Mod: ,,, | Performed by: CLINICAL MEDICAL LABORATORY

## 2024-08-20 PROCEDURE — 87086 URINE CULTURE/COLONY COUNT: CPT | Mod: ,,, | Performed by: CLINICAL MEDICAL LABORATORY

## 2024-08-20 PROCEDURE — 81003 URINALYSIS AUTO W/O SCOPE: CPT | Mod: RHCUB

## 2024-08-20 PROCEDURE — 87186 SC STD MICRODIL/AGAR DIL: CPT | Mod: ,,, | Performed by: CLINICAL MEDICAL LABORATORY

## 2024-08-20 PROCEDURE — 87077 CULTURE AEROBIC IDENTIFY: CPT | Mod: ,,, | Performed by: CLINICAL MEDICAL LABORATORY

## 2024-08-20 RX ORDER — SULFAMETHOXAZOLE AND TRIMETHOPRIM 800; 160 MG/1; MG/1
1 TABLET ORAL 2 TIMES DAILY
Qty: 14 TABLET | Refills: 0 | Status: SHIPPED | OUTPATIENT
Start: 2024-08-20

## 2024-08-21 NOTE — PROGRESS NOTES
Subjective     Patient ID: Nigel Camejo is a 29 y.o. female.    Chief Complaint: Urinary Tract Infection, Low-back Pain, Urinary Frequency, Dysuria (Pt states s/s x1 day.), and room B8 (Pt states having longer menstrual periods last month 18 days with heavy bleeding and clots this month been on sent 7.)    Urinary Tract Infection   Associated symptoms include frequency. Pertinent negatives include no chills, flank pain, hematuria, nausea, urgency, vomiting or rash.   Low-back Pain  Pertinent negatives include no abdominal pain, chills, fatigue, fever, nausea, rash or vomiting.   Urinary Frequency   Associated symptoms include frequency. Pertinent negatives include no chills, flank pain, hematuria, nausea, urgency, vomiting or rash.   Dysuria   Associated symptoms include frequency. Pertinent negatives include no chills, flank pain, hematuria, nausea, urgency, vomiting or rash.     Review of Systems   Constitutional:  Negative for chills, fatigue and fever.   Gastrointestinal:  Negative for abdominal pain, nausea and vomiting.   Genitourinary:  Positive for dysuria, frequency, menstrual irregularity and vaginal bleeding. Negative for bladder incontinence, decreased urine volume, difficulty urinating, flank pain, genital sores, hematuria, menstrual problem, pelvic pain, urgency, vaginal discharge and vaginal pain.   Musculoskeletal:  Negative for back pain.   Integumentary:  Negative for rash.   Hematological:  Negative for adenopathy.          Objective     Physical Exam  Vitals and nursing note reviewed.   Constitutional:       Appearance: Normal appearance. She is obese.   Cardiovascular:      Rate and Rhythm: Normal rate and regular rhythm.      Pulses: Normal pulses.      Heart sounds: Normal heart sounds.   Pulmonary:      Effort: Pulmonary effort is normal.      Breath sounds: Normal breath sounds.   Abdominal:      General: Abdomen is flat. Bowel sounds are normal.      Palpations: Abdomen is soft.       Tenderness: There is no right CVA tenderness or left CVA tenderness.   Musculoskeletal:         General: Normal range of motion.      Cervical back: Normal range of motion and neck supple.   Skin:     General: Skin is warm and dry.      Capillary Refill: Capillary refill takes less than 2 seconds.   Neurological:      General: No focal deficit present.      Mental Status: She is alert and oriented to person, place, and time.   Psychiatric:         Mood and Affect: Mood normal.         Behavior: Behavior normal.         Thought Content: Thought content normal.         Judgment: Judgment normal.            Assessment and Plan     1. Dysuria  -     POCT URINALYSIS W/O SCOPE    2. Urine WBC increased  -     Urine culture    3. Menorrhagia with regular cycle  -     CBC Auto Differential; Future; Expected date: 08/20/2024  -     CBC Morphology    4. Cystitis  -     sulfamethoxazole-trimethoprim 800-160mg (BACTRIM DS) 800-160 mg Tab; Take 1 tablet by mouth 2 (two) times daily.  Dispense: 14 tablet; Refill: 0      Increase water intake  Discussed how switching birth control methods can cause menstrual changes  Check cbc today and advised to f/u with gyn         Follow up if symptoms worsen or fail to improve.    I spent a total of 30 minutes on the day of the visit.This includes face to face time and non-face to face time preparing to see the patient (eg, review of tests), obtaining and/or reviewing separately obtained history, documenting clinical information in the electronic or other health record, independently interpreting results and communicating results to the patient/family/caregiver, or care coordinator.    RYAN Dominguez

## 2024-08-22 DIAGNOSIS — D64.9 ANEMIA, UNSPECIFIED TYPE: Primary | ICD-10-CM

## 2024-08-22 LAB — UA COMPLETE W REFLEX CULTURE PNL UR: ABNORMAL

## 2024-08-27 ENCOUNTER — PATIENT MESSAGE (OUTPATIENT)
Dept: FAMILY MEDICINE | Facility: CLINIC | Age: 29
End: 2024-08-27
Payer: MEDICAID

## 2024-08-27 RX ORDER — CYANOCOBALAMIN 1000 UG/ML
1000 INJECTION, SOLUTION INTRAMUSCULAR; SUBCUTANEOUS
Qty: 3 ML | Refills: 0 | Status: SHIPPED | OUTPATIENT
Start: 2024-08-27

## 2024-08-27 RX ORDER — FERROUS SULFATE 325(65) MG
325 TABLET, DELAYED RELEASE (ENTERIC COATED) ORAL
Qty: 90 TABLET | Refills: 1 | Status: SHIPPED | OUTPATIENT
Start: 2024-08-27

## 2024-09-29 ENCOUNTER — HOSPITAL ENCOUNTER (EMERGENCY)
Facility: HOSPITAL | Age: 29
Discharge: HOME | End: 2024-09-29
Payer: MEDICAID

## 2024-09-29 VITALS
BODY MASS INDEX: 39.24 KG/M2 | OXYGEN SATURATION: 99 % | HEART RATE: 95 BPM | DIASTOLIC BLOOD PRESSURE: 93 MMHG | RESPIRATION RATE: 18 BRPM | WEIGHT: 250 LBS | HEIGHT: 67 IN | SYSTOLIC BLOOD PRESSURE: 126 MMHG | TEMPERATURE: 100 F

## 2024-09-29 DIAGNOSIS — J02.9 EXUDATIVE PHARYNGITIS: Primary | ICD-10-CM

## 2024-09-29 LAB — GROUP A STREP MOLECULAR (OHS): NEGATIVE

## 2024-09-29 PROCEDURE — 99284 EMERGENCY DEPT VISIT MOD MDM: CPT | Mod: ,,, | Performed by: NURSE PRACTITIONER

## 2024-09-29 PROCEDURE — 87651 STREP A DNA AMP PROBE: CPT | Performed by: NURSE PRACTITIONER

## 2024-09-29 PROCEDURE — 25000003 PHARM REV CODE 250: Performed by: NURSE PRACTITIONER

## 2024-09-29 PROCEDURE — 99283 EMERGENCY DEPT VISIT LOW MDM: CPT

## 2024-09-29 RX ORDER — AMOXICILLIN 500 MG/1
500 CAPSULE ORAL EVERY 12 HOURS
Qty: 14 CAPSULE | Refills: 0 | Status: SHIPPED | OUTPATIENT
Start: 2024-09-29 | End: 2024-10-06

## 2024-09-29 RX ORDER — ACETAMINOPHEN 500 MG
1000 TABLET ORAL
Status: COMPLETED | OUTPATIENT
Start: 2024-09-29 | End: 2024-09-29

## 2024-09-29 RX ADMIN — ACETAMINOPHEN 1000 MG: 500 TABLET ORAL at 11:09

## 2024-09-29 NOTE — Clinical Note
"Nigel"Gibran Camejo was seen and treated in our emergency department on 9/29/2024.  She may return to work on 10/01/2024.       If you have any questions or concerns, please don't hesitate to call.      Monisha Branch, HUNG"

## 2024-09-30 ENCOUNTER — TELEPHONE (OUTPATIENT)
Dept: EMERGENCY MEDICINE | Facility: HOSPITAL | Age: 29
End: 2024-09-30
Payer: MEDICAID

## 2024-09-30 NOTE — ED PROVIDER NOTES
Encounter Date: 2024       History     Chief Complaint   Patient presents with    Sore Throat    Generalized Body Aches    Otalgia     Pt is a 29 year old AAF who presents to the ER for sore throat, left ear pain, body aches today. Denies cough, SOB, wheezing, fever.     The history is provided by the patient.     Review of patient's allergies indicates:   Allergen Reactions    Iodinated contrast media Itching     Past Medical History:   Diagnosis Date    Anxiety     Depression     Hypertension      Past Surgical History:   Procedure Laterality Date     SECTION      x2     SECTION N/A 2023    Procedure:  SECTION;  Surgeon: Jim England MD;  Location: [facility] L&D;  Service: OB/GYN;  Laterality: N/A;     Family History   Problem Relation Name Age of Onset    Bone cancer Maternal Grandmother Chandni Norman     Cancer Maternal Grandmother Chandni Norman     Pancreatic cancer Maternal Grandfather Jaswinder Alanis     Cancer Maternal Grandfather Jaswinder Alanis     Diabetes Father Nathan Soriano     Hypertension Mother Vidhi Parisi     Heart failure Mother Vidhi Parisi     Diabetes Maternal Aunt      Cancer Maternal Aunt      Kidney failure Maternal Aunt      Diabetes Maternal Aunt Marbella Verduzco     Stroke Maternal Aunt Marbella Verudzco     Diabetes Maternal Aunt Stella Flores      Social History     Tobacco Use    Smoking status: Former     Passive exposure: Never    Smokeless tobacco: Never   Substance Use Topics    Alcohol use: Not Currently    Drug use: Not Currently     Review of Systems   Constitutional:  Negative for chills, diaphoresis, fatigue and fever.   HENT:  Positive for ear pain and sore throat. Negative for congestion, nosebleeds, postnasal drip, rhinorrhea, sinus pressure, sinus pain and sneezing.    Respiratory:  Negative for shortness of breath.    Cardiovascular:  Negative for chest pain.   Gastrointestinal:  Negative for nausea.   Genitourinary:  Negative for dysuria.    Musculoskeletal:  Positive for myalgias. Negative for back pain.   Skin:  Negative for rash.   Neurological:  Negative for weakness.   Hematological:  Does not bruise/bleed easily.       Physical Exam     Initial Vitals [09/29/24 2250]   BP Pulse Resp Temp SpO2   (!) 126/93 95 18 99.6 °F (37.6 °C) 99 %      MAP       --         Physical Exam    Nursing note and vitals reviewed.  Constitutional: She appears well-developed and well-nourished. She is not diaphoretic. She is cooperative.  Non-toxic appearance. She appears ill. No distress.   HENT:   Head: Normocephalic and atraumatic.   Right Ear: Tympanic membrane, external ear and ear canal normal.   Left Ear: Tympanic membrane, external ear and ear canal normal.   Nose: Nose normal. Mouth/Throat: Uvula is midline and mucous membranes are normal. Oropharyngeal exudate and posterior oropharyngeal erythema present. No posterior oropharyngeal edema or tonsillar abscesses.   Eyes: Pupils are equal, round, and reactive to light.   Neck: Neck supple.   Cardiovascular:  Normal rate, regular rhythm, normal heart sounds and intact distal pulses.     Exam reveals no gallop and no friction rub.       No murmur heard.  Pulmonary/Chest: Breath sounds normal. No respiratory distress. She has no wheezes. She has no rhonchi. She has no rales. She exhibits no tenderness.   Musculoskeletal:      Cervical back: Neck supple.     Lymphadenopathy:     She has no cervical adenopathy.   Neurological: She is alert and oriented to person, place, and time.   Skin: Skin is warm and dry. Capillary refill takes less than 2 seconds.   Psychiatric: She has a normal mood and affect.         Medical Screening Exam   See Full Note    ED Course   Procedures  Labs Reviewed   STREP A BY MOLECULAR METHOD - Normal       Result Value    Group A Strep Molecular Negative            Imaging Results    None          Medications   acetaminophen tablet 1,000 mg (1,000 mg Oral Given 9/29/24 0887)     Medical  Decision Making  Problems Addressed:  Exudative pharyngitis: self-limited or minor problem    Amount and/or Complexity of Data Reviewed  Labs: ordered. Decision-making details documented in ED Course.    Risk  OTC drugs.  Prescription drug management.               ED Course as of 09/29/24 2330   Sun Sep 29, 2024   2330 Strep negative, will treat as exudative pharyngitis [AG]      ED Course User Index  [AG] Monisha Branch FNP                           Clinical Impression:   Final diagnoses:  [J02.9] Exudative pharyngitis (Primary)        ED Disposition Condition    Discharge Stable          ED Prescriptions       Medication Sig Dispense Start Date End Date Auth. Provider    amoxicillin (AMOXIL) 500 MG capsule Take 1 capsule (500 mg total) by mouth every 12 (twelve) hours. for 7 days 14 capsule 9/29/2024 10/6/2024 Monisha Branch FNP          Follow-up Information       Follow up With Specialties Details Why Contact Info    Armida Lopez FNP-C Family Medicine Schedule an appointment as soon as possible for a visit in 3 days As needed [address] [place] [address]  [phone]               Monisha Branch FNP  09/29/24 2330

## 2024-09-30 NOTE — DISCHARGE INSTRUCTIONS
Over the counter meds such as motrin or tylenol as needed for pain/fever.  Use prescription as ordered.  Increase water intake.  Warm salt water gargles.  Return to the ER for worsening of your symptoms.

## 2025-02-11 ENCOUNTER — HOSPITAL ENCOUNTER (EMERGENCY)
Facility: HOSPITAL | Age: 30
Discharge: HOME | End: 2025-02-11

## 2025-02-11 VITALS
OXYGEN SATURATION: 100 % | SYSTOLIC BLOOD PRESSURE: 140 MMHG | DIASTOLIC BLOOD PRESSURE: 84 MMHG | TEMPERATURE: 98 F | BODY MASS INDEX: 35.39 KG/M2 | HEART RATE: 74 BPM | WEIGHT: 225.5 LBS | RESPIRATION RATE: 16 BRPM | HEIGHT: 67 IN

## 2025-02-11 DIAGNOSIS — B34.9 VIRAL SYNDROME: Primary | ICD-10-CM

## 2025-02-11 LAB
ANION GAP SERPL CALCULATED.3IONS-SCNC: 12 MMOL/L (ref 7–16)
ANISOCYTOSIS BLD QL SMEAR: ABNORMAL
B-HCG UR QL: NEGATIVE
BASOPHILS # BLD AUTO: 0.06 K/UL (ref 0–0.2)
BASOPHILS NFR BLD AUTO: 0.6 % (ref 0–1)
BUN SERPL-MCNC: 10 MG/DL (ref 7–19)
BUN/CREAT SERPL: 12 (ref 6–20)
CALCIUM SERPL-MCNC: 9 MG/DL (ref 8.4–10.2)
CHLORIDE SERPL-SCNC: 108 MMOL/L (ref 98–107)
CO2 SERPL-SCNC: 24 MMOL/L (ref 22–29)
CREAT SERPL-MCNC: 0.84 MG/DL (ref 0.55–1.02)
CTP QC/QA: YES
DIFFERENTIAL METHOD BLD: ABNORMAL
EGFR (NO RACE VARIABLE) (RUSH/TITUS): 95 ML/MIN/1.73M2
EOSINOPHIL # BLD AUTO: 0.2 K/UL (ref 0–0.5)
EOSINOPHIL NFR BLD AUTO: 2.1 % (ref 1–4)
ERYTHROCYTE [DISTWIDTH] IN BLOOD BY AUTOMATED COUNT: 18.2 % (ref 11.5–14.5)
GLUCOSE SERPL-MCNC: 99 MG/DL (ref 74–100)
HCT VFR BLD AUTO: 33.6 % (ref 38–47)
HGB BLD-MCNC: 9.7 G/DL (ref 12–16)
IMM GRANULOCYTES # BLD AUTO: 0.04 K/UL (ref 0–0.04)
IMM GRANULOCYTES NFR BLD: 0.4 % (ref 0–0.4)
LYMPHOCYTES # BLD AUTO: 2.25 K/UL (ref 1–4.8)
LYMPHOCYTES NFR BLD AUTO: 23.9 % (ref 27–41)
MCH RBC QN AUTO: 19.2 PG (ref 27–31)
MCHC RBC AUTO-ENTMCNC: 28.9 G/DL (ref 32–36)
MCV RBC AUTO: 66.4 FL (ref 80–96)
MICROCYTES BLD QL SMEAR: ABNORMAL
MONOCYTES # BLD AUTO: 0.48 K/UL (ref 0–0.8)
MONOCYTES NFR BLD AUTO: 5.1 % (ref 2–6)
MPC BLD CALC-MCNC: ABNORMAL G/DL
NEUTROPHILS # BLD AUTO: 6.4 K/UL (ref 1.8–7.7)
NEUTROPHILS NFR BLD AUTO: 67.9 % (ref 53–65)
NRBC # BLD AUTO: 0 X10E3/UL
NRBC, AUTO (.00): 0 %
OVALOCYTES BLD QL SMEAR: ABNORMAL
PLATELET # BLD AUTO: 259 K/UL (ref 150–400)
PLATELET MORPHOLOGY: ABNORMAL
POTASSIUM SERPL-SCNC: 3.9 MMOL/L (ref 3.5–5.1)
RBC # BLD AUTO: 5.06 M/UL (ref 4.2–5.4)
SODIUM SERPL-SCNC: 140 MMOL/L (ref 136–145)
WBC # BLD AUTO: 9.43 K/UL (ref 4.5–11)

## 2025-02-11 PROCEDURE — 36415 COLL VENOUS BLD VENIPUNCTURE: CPT | Performed by: NURSE PRACTITIONER

## 2025-02-11 PROCEDURE — 99284 EMERGENCY DEPT VISIT MOD MDM: CPT | Mod: 25

## 2025-02-11 PROCEDURE — 81025 URINE PREGNANCY TEST: CPT | Performed by: NURSE PRACTITIONER

## 2025-02-11 PROCEDURE — 85025 COMPLETE CBC W/AUTO DIFF WBC: CPT | Performed by: NURSE PRACTITIONER

## 2025-02-11 PROCEDURE — 63600175 PHARM REV CODE 636 W HCPCS: Performed by: NURSE PRACTITIONER

## 2025-02-11 PROCEDURE — 80048 BASIC METABOLIC PNL TOTAL CA: CPT | Performed by: NURSE PRACTITIONER

## 2025-02-11 PROCEDURE — 96372 THER/PROPH/DIAG INJ SC/IM: CPT | Performed by: NURSE PRACTITIONER

## 2025-02-11 RX ORDER — PROMETHAZINE HYDROCHLORIDE 25 MG/1
25 TABLET ORAL EVERY 6 HOURS PRN
Qty: 15 TABLET | Refills: 0 | Status: SHIPPED | OUTPATIENT
Start: 2025-02-11

## 2025-02-11 RX ORDER — PROMETHAZINE HYDROCHLORIDE 25 MG/ML
25 INJECTION, SOLUTION INTRAMUSCULAR; INTRAVENOUS
Status: COMPLETED | OUTPATIENT
Start: 2025-02-11 | End: 2025-02-11

## 2025-02-11 RX ADMIN — PROMETHAZINE HYDROCHLORIDE 25 MG: 25 INJECTION INTRAMUSCULAR; INTRAVENOUS at 10:02

## 2025-02-11 NOTE — DISCHARGE INSTRUCTIONS
Rest. Drink plenty of fluids. Rotate tylenol and ibuprofen as needed as directed. Follow up with your primary care provider in 2 days.  Return to the emergency department for any increase in symptoms or for any other new or worrisome symptoms.

## 2025-02-11 NOTE — ED PROVIDER NOTES
Encounter Date: 2025       History     Chief Complaint   Patient presents with    Hypertension    Dizziness     29-year-old female presents to the emergency department to be evaluated for nausea, vomiting, lightheadedness and headache that began yesterday.  Denies any abdominal pain, dysuria, diarrhea, constipation.  Denies any recent fall or head injury.    The history is provided by the patient.   Emesis   This is a new problem. The current episode started yesterday. The problem occurs 2 - 4 times per day. Associated symptoms include headaches. Pertinent negatives include no abdominal pain, no arthralgias, no chills, no cough, no diarrhea, no fever, no myalgias, no sweats and no URI.     Review of patient's allergies indicates:   Allergen Reactions    Iodinated contrast media Itching     Past Medical History:   Diagnosis Date    Anxiety     Depression     Hypertension      Past Surgical History:   Procedure Laterality Date     SECTION      x2     SECTION N/A 2023    Procedure:  SECTION;  Surgeon: Jim England MD;  Location: [facility] L&D;  Service: OB/GYN;  Laterality: N/A;     Family History   Problem Relation Name Age of Onset    Bone cancer Maternal Grandmother Chandni Norman     Cancer Maternal Grandmother Chandni Norman     Pancreatic cancer Maternal Grandfather Jaswinder Alanis     Cancer Maternal Grandfather Jaswinder Alanis     Diabetes Father Nathan Soriano     Hypertension Mother Vidhi Parisi     Heart failure Mother Vidhi Parisi     Diabetes Maternal Aunt      Cancer Maternal Aunt      Kidney failure Maternal Aunt      Diabetes Maternal Aunt Marbella Clay     Stroke Maternal Aunt Marbella Dax     Diabetes Maternal Aunt Stella Mark      Social History     Tobacco Use    Smoking status: Former     Passive exposure: Never    Smokeless tobacco: Never   Substance Use Topics    Alcohol use: Not Currently    Drug use: Not Currently     Review of Systems   Constitutional:   Negative for chills and fever.   Respiratory:  Negative for cough and shortness of breath.    Cardiovascular:  Negative for chest pain.   Gastrointestinal:  Positive for nausea and vomiting. Negative for abdominal pain, constipation and diarrhea.   Musculoskeletal:  Negative for arthralgias and myalgias.   Neurological:  Positive for headaches.   All other systems reviewed and are negative.      Physical Exam     Initial Vitals [02/11/25 0912]   BP Pulse Resp Temp SpO2   (!) 140/84 74 16 97.9 °F (36.6 °C) 100 %      MAP       --         Physical Exam    Vitals reviewed.  Constitutional: She appears well-developed and well-nourished.   HENT:   Head: Normocephalic and atraumatic.   Eyes: EOM are normal. Pupils are equal, round, and reactive to light.   Neck: Neck supple.   Cardiovascular:  Normal rate and regular rhythm.           Pulmonary/Chest: Breath sounds normal.   Abdominal: Abdomen is soft. Bowel sounds are normal. She exhibits no distension and no mass. There is no abdominal tenderness. There is no rebound and no guarding.   Musculoskeletal:         General: Normal range of motion.      Cervical back: Neck supple.     Neurological: She is alert and oriented to person, place, and time. She has normal strength. GCS score is 15. GCS eye subscore is 4. GCS verbal subscore is 5. GCS motor subscore is 6.   Skin: Skin is warm and dry. Capillary refill takes less than 2 seconds.   Psychiatric: She has a normal mood and affect.         Medical Screening Exam   See Full Note    ED Course   Procedures  Labs Reviewed   BASIC METABOLIC PANEL - Abnormal       Result Value    Sodium 140      Potassium 3.9      Chloride 108 (*)     CO2 24      Anion Gap 12      Glucose 99      BUN 10      Creatinine 0.84      BUN/Creatinine Ratio 12      Calcium 9.0      eGFR 95     CBC WITH DIFFERENTIAL - Abnormal    WBC 9.43      RBC 5.06      Hemoglobin 9.7 (*)     Hematocrit 33.6 (*)     MCV 66.4 (*)     MCH 19.2 (*)     MCHC 28.9 (*)      RDW 18.2 (*)     Platelet Count 259      MPV        Neutrophils % 67.9 (*)     Lymphocytes % 23.9 (*)     Monocytes % 5.1      Eosinophils % 2.1      Basophils % 0.6      Immature Granulocytes % 0.4      nRBC, Auto 0.0      Neutrophils, Abs 6.40      Lymphocytes, Absolute 2.25      Monocytes, Absolute 0.48      Eosinophils, Absolute 0.20      Basophils, Absolute 0.06      Immature Granulocytes, Absolute 0.04      nRBC, Absolute 0.00      Diff Type Scan Smear     CBC MORPHOLOGY - Abnormal    Platelet Morphology Large & Giant Platelets (*)     Anisocytosis 1+      Microcytosis 2+      Ovalocytes Few     CBC W/ AUTO DIFFERENTIAL    Narrative:     The following orders were created for panel order CBC auto differential.  Procedure                               Abnormality         Status                     ---------                               -----------         ------                     CBC with Differential[[id number]]       Abnormal            Final result                 Please view results for these tests on the individual orders.   POCT URINE PREGNANCY    POC Preg Test, Ur Negative       Acceptable Yes            Imaging Results    None          Medications   promethazine injection 25 mg (25 mg Intramuscular Given 2/11/25 1043)     Medical Decision Making  29-year-old female presents to the emergency department to be evaluated for nausea, vomiting, lightheadedness and headache that began yesterday.  Denies any abdominal pain, dysuria, diarrhea, constipation.  Denies any recent fall or head injury.  Labs ordered and reviewed  Treated with Phenergan  Patient said she feels better and wants to be discharged home  Diagnosis:  Viral syndrome  Prescribed Phenergan    Amount and/or Complexity of Data Reviewed  Labs: ordered.    Risk  Prescription drug management.                                      Clinical Impression:   Final diagnoses:  [B34.9] Viral syndrome (Primary)        ED Disposition  Condition    Discharge Stable          ED Prescriptions       Medication Sig Dispense Start Date End Date Auth. Provider    promethazine (PHENERGAN) 25 MG tablet Take 1 tablet (25 mg total) by mouth every 6 (six) hours as needed for Nausea. 15 tablet 2/11/2025 -- Armida Stone FNP          Follow-up Information    None          Armida Stone FNP  02/11/25 1119

## 2025-02-11 NOTE — Clinical Note
"Nigel"Gibran Camejo was seen and treated in our emergency department on 2/11/2025.  She may return to work on 02/13/2025.       If you have any questions or concerns, please don't hesitate to call.      Armida Stone, HUNG"

## 2025-02-11 NOTE — ED TRIAGE NOTES
Pt presents to the ED with c/o hypertension and dizziness. Pt states that b/p medication makes her feel bad and her blood pressure was elevated this morning.

## 2025-07-20 ENCOUNTER — HOSPITAL ENCOUNTER (EMERGENCY)
Facility: HOSPITAL | Age: 30
Discharge: HOME | End: 2025-07-20
Payer: MEDICAID

## 2025-07-20 VITALS
RESPIRATION RATE: 18 BRPM | HEART RATE: 93 BPM | TEMPERATURE: 99 F | HEIGHT: 67 IN | WEIGHT: 250 LBS | DIASTOLIC BLOOD PRESSURE: 96 MMHG | OXYGEN SATURATION: 97 % | BODY MASS INDEX: 39.24 KG/M2 | SYSTOLIC BLOOD PRESSURE: 162 MMHG

## 2025-07-20 DIAGNOSIS — N76.4 LABIAL ABSCESS: Primary | ICD-10-CM

## 2025-07-20 PROCEDURE — 63600175 PHARM REV CODE 636 W HCPCS: Performed by: NURSE PRACTITIONER

## 2025-07-20 PROCEDURE — 99284 EMERGENCY DEPT VISIT MOD MDM: CPT | Mod: ,,, | Performed by: NURSE PRACTITIONER

## 2025-07-20 PROCEDURE — 99284 EMERGENCY DEPT VISIT MOD MDM: CPT | Mod: 25

## 2025-07-20 PROCEDURE — 96372 THER/PROPH/DIAG INJ SC/IM: CPT | Performed by: NURSE PRACTITIONER

## 2025-07-20 RX ORDER — LIDOCAINE HYDROCHLORIDE 10 MG/ML
1 INJECTION, SOLUTION INFILTRATION; PERINEURAL ONCE
Status: DISCONTINUED | OUTPATIENT
Start: 2025-07-20 | End: 2025-07-20

## 2025-07-20 RX ORDER — LIDOCAINE HYDROCHLORIDE 10 MG/ML
1 INJECTION, SOLUTION INFILTRATION; PERINEURAL
Status: COMPLETED | OUTPATIENT
Start: 2025-07-20 | End: 2025-07-20

## 2025-07-20 RX ORDER — CEFTRIAXONE 1 G/1
1 INJECTION, POWDER, FOR SOLUTION INTRAMUSCULAR; INTRAVENOUS
Status: COMPLETED | OUTPATIENT
Start: 2025-07-20 | End: 2025-07-20

## 2025-07-20 RX ORDER — CEFTRIAXONE 1 G/1
1 INJECTION, POWDER, FOR SOLUTION INTRAMUSCULAR; INTRAVENOUS ONCE
Status: DISCONTINUED | OUTPATIENT
Start: 2025-07-20 | End: 2025-07-20

## 2025-07-20 RX ORDER — SULFAMETHOXAZOLE AND TRIMETHOPRIM 800; 160 MG/1; MG/1
1 TABLET ORAL 2 TIMES DAILY
Qty: 14 TABLET | Refills: 0 | Status: SHIPPED | OUTPATIENT
Start: 2025-07-20 | End: 2025-07-27

## 2025-07-20 RX ADMIN — CEFTRIAXONE SODIUM 1 G: 1 INJECTION, POWDER, FOR SOLUTION INTRAMUSCULAR; INTRAVENOUS at 05:07

## 2025-07-20 RX ADMIN — LIDOCAINE HYDROCHLORIDE 2.1 ML: 10 INJECTION, SOLUTION INFILTRATION; PERINEURAL at 05:07

## 2025-07-20 NOTE — ED TRIAGE NOTES
"C/o "boil on my privates" states is very painful and can not sit down. States started last night.   "

## 2025-07-20 NOTE — Clinical Note
"Nigel"Gibran Camejo was seen and treated in our emergency department on 7/20/2025.  She may return to work on 07/22/2025.       If you have any questions or concerns, please don't hesitate to call.      Sal Hernandez, FNP"

## 2025-07-20 NOTE — ED PROVIDER NOTES
Encounter Date: 2025       History     Chief Complaint   Patient presents with    Abscess     Pain tender right labia that started last night with a very small area that feels like the start of an abscess.    The history is provided by the patient.     Review of patient's allergies indicates:   Allergen Reactions    Iodinated contrast media Itching     Past Medical History:   Diagnosis Date    Anxiety     Depression     Hypertension      Past Surgical History:   Procedure Laterality Date     SECTION      x2     SECTION N/A 2023    Procedure:  SECTION;  Surgeon: Jim England MD;  Location: [facility] L&D;  Service: OB/GYN;  Laterality: N/A;     Family History   Problem Relation Name Age of Onset    Bone cancer Maternal Grandmother Chandni Norman     Cancer Maternal Grandmother Chandni Norman     Pancreatic cancer Maternal Grandfather Jaswinder Alanis     Cancer Maternal Grandfather Jaswinder Alanis     Diabetes Father Nathan Soriano     Hypertension Mother Vidhi Parisi     Heart failure Mother Vidhi Parisi     Diabetes Maternal Aunt      Cancer Maternal Aunt      Kidney failure Maternal Aunt      Diabetes Maternal Aunt Marbella Verduzco     Stroke Maternal Aunt Marbella Verduzco     Diabetes Maternal Aunt Stella Flores      Social History[1]  Review of Systems   Genitourinary:  Positive for vaginal pain.   All other systems reviewed and are negative.      Physical Exam     Initial Vitals [25 1636]   BP Pulse Resp Temp SpO2   (!) 162/96 93 18 98.8 °F (37.1 °C) 97 %      MAP       --         Physical Exam    Constitutional: She appears well-developed and well-nourished.   HENT:   Head: Normocephalic.   Right Ear: External ear normal.   Left Ear: External ear normal. Mouth/Throat: Oropharynx is clear and moist.   Eyes: Pupils are equal, round, and reactive to light.   Neck:   Normal range of motion.  Cardiovascular:  Normal rate and regular rhythm.           Pulmonary/Chest: Breath sounds  normal.   Abdominal: Abdomen is soft. Bowel sounds are normal.   Genitourinary:    Genitourinary Comments: Very small area of induration in the right labia probably about 7 mm non fluctuant but tender.  History of abscess there previously that she states healed with antibiotics only.     Musculoskeletal:         General: Normal range of motion.      Cervical back: Normal range of motion.     Neurological: She is alert and oriented to person, place, and time.   Skin: Skin is warm.   Psychiatric: She has a normal mood and affect.         Medical Screening Exam   See Full Note    ED Course   Procedures  Labs Reviewed - No data to display       Imaging Results    None          Medications   cefTRIAXone injection 1 g (has no administration in time range)   LIDOcaine HCL 10 mg/ml (1%) injection 1 mL (has no administration in time range)     Medical Decision Making  Pain tender right labia that started last night with a very small area that feels like the start of an abscess.  We will have her do warm Epson salt soaks.  Follow up with OBGYN as soon as possible if symptoms worsen at any time    Risk  Prescription drug management.                                      Clinical Impression:   Final diagnoses:  [N76.4] Labial abscess (Primary)        ED Disposition Condition    Discharge Stable          ED Prescriptions       Medication Sig Dispense Start Date End Date Auth. Provider    sulfamethoxazole-trimethoprim 800-160mg (BACTRIM DS) 800-160 mg Tab Take 1 tablet by mouth 2 (two) times daily. for 7 days 14 tablet 7/20/2025 7/27/2025 Sal Hernandez FNP          Follow-up Information    None            [1]   Social History  Tobacco Use    Smoking status: Former     Passive exposure: Never    Smokeless tobacco: Never   Vaping Use    Vaping status: Never Used   Substance Use Topics    Alcohol use: Not Currently    Drug use: Not Currently        Sal Hernandez FNP  07/20/25 5520

## 2025-07-22 ENCOUNTER — TELEPHONE (OUTPATIENT)
Dept: EMERGENCY MEDICINE | Facility: HOSPITAL | Age: 30
End: 2025-07-22
Payer: MEDICAID

## (undated) DEVICE — GLOVE PROTEXIS PI SYN SURG 7

## (undated) DEVICE — SUT VICRYL PLUS 1 CTX 36IN

## (undated) DEVICE — STRIP MEDI WND CLSR 1/4X4IN

## (undated) DEVICE — STAPLER SKIN SUBCUTICULAR

## (undated) DEVICE — GLOVE PROTEXIS PI SYN SURG 6.5

## (undated) DEVICE — SOL NACL IRR 1000ML BTL

## (undated) DEVICE — CANISTER 1200 SUCTION CCMEDI-V

## (undated) DEVICE — PACK C SECTION RUSH

## (undated) DEVICE — SUT 2/0 27IN PLAIN GUT CT

## (undated) DEVICE — APPLICATOR CHLORAPREP ORN 26ML

## (undated) DEVICE — HEMOSTAT SURGICEL 4X8IN

## (undated) DEVICE — ELECTRODE REM PLYHSV RETURN 9